# Patient Record
Sex: FEMALE | Race: WHITE | NOT HISPANIC OR LATINO | Employment: OTHER | URBAN - METROPOLITAN AREA
[De-identification: names, ages, dates, MRNs, and addresses within clinical notes are randomized per-mention and may not be internally consistent; named-entity substitution may affect disease eponyms.]

---

## 2017-03-15 ENCOUNTER — VIRTUAL VISIT (OUTPATIENT)
Dept: FAMILY MEDICINE | Facility: OTHER | Age: 62
End: 2017-03-15

## 2017-03-15 NOTE — PROGRESS NOTES
Date:   Clinician: Diana Rocha  Clinician NPI: [id number]  Patient: Carol Barkley  Patient : 1955  Patient Address: [address] [place] [address]  Patient Phone: [phone]  Visit Protocol: Santosh  Patient Summary:  Carol is a 62 year old ( : 1955 ) female who initiated a Zip because of a bump on the eyelid.     A synchronous visit is necessary because the patient reported the following abnormal symptoms:    Unable or prefers not to upload photo   She was unable to submit a picture of the lesion on her eyelid.  The patient's symptoms began 5 - 6 days ago. The lesion is located on the inside of the right upper eyelid. Carol indicated that the lesion does not look like a mole / nevus however, it does look like a pimple or boil.   The eyelid lesion is associated with crusting, eyelid swelling, and pain. The pain severity is a 2 / 10 on a pain scale.   There is no lacrimation present. The patient noted that the the lesion is staying the same (i.e. Neither progressing or diminishing).   In the last two to four weeks, Carol denies having having been treated for an eye infection, eye surgery, dust or dirt in the eye, and an injury to the eye. She also denies a fever, red sclera, hemorrhaging from the lesion, blepharitis throughout the entire eyelid, periorbital edema or inflammation, and light sensitivity. She does not wear contact lenses.   The patient does not smoke or use smokeless tobacco. The patient denies pregnancy and denies that she is breastfeeding.   MEDICATIONS:   Patient free text response: imitrex, clobetesol, retenoid cream  , ALLERGIES:  NKDA   Clinician Response:  Dear Carol,  Based on the information you provided during your interview, a conversation with a [facility] clinician was required in order to diagnose and treat you. I attempted to contact you several times but have not been able to reach you. For this reason, I cannot safely provide you with  treatment recommendations at this time. Because we care about your health, please be seen in a clinic or urgent care to determine the care that is best for you. You will not be charged for this Zip. Thanks for using [employer].   Diagnosis: Unable to contact patient by phone/video  Diagnosis ICD: R69  Synchronous Triage: video, status: ready, duration: 0 seconds

## 2017-03-24 ENCOUNTER — OFFICE VISIT (OUTPATIENT)
Dept: FAMILY MEDICINE | Facility: CLINIC | Age: 62
End: 2017-03-24
Payer: COMMERCIAL

## 2017-03-24 VITALS
HEART RATE: 66 BPM | DIASTOLIC BLOOD PRESSURE: 78 MMHG | TEMPERATURE: 98.5 F | OXYGEN SATURATION: 99 % | SYSTOLIC BLOOD PRESSURE: 129 MMHG | BODY MASS INDEX: 30.97 KG/M2 | WEIGHT: 189 LBS

## 2017-03-24 DIAGNOSIS — H00.011 HORDEOLUM EXTERNUM OF RIGHT UPPER EYELID: Primary | ICD-10-CM

## 2017-03-24 PROBLEM — H00.11 CHALAZION OF RIGHT UPPER EYELID: Status: ACTIVE | Noted: 2017-03-24

## 2017-03-24 PROCEDURE — 99213 OFFICE O/P EST LOW 20 MIN: CPT | Performed by: FAMILY MEDICINE

## 2017-03-24 RX ORDER — SULFAMETHOXAZOLE/TRIMETHOPRIM 800-160 MG
1 TABLET ORAL 2 TIMES DAILY
Qty: 20 TABLET | Refills: 0 | Status: SHIPPED | OUTPATIENT
Start: 2017-03-24 | End: 2017-03-24

## 2017-03-24 RX ORDER — SULFAMETHOXAZOLE/TRIMETHOPRIM 800-160 MG
1 TABLET ORAL 2 TIMES DAILY
Qty: 20 TABLET | Refills: 0 | Status: SHIPPED | OUTPATIENT
Start: 2017-03-24 | End: 2017-11-09

## 2017-03-24 ASSESSMENT — ENCOUNTER SYMPTOMS
EYE PAIN: 0
RESPIRATORY NEGATIVE: 1
BLURRED VISION: 0
PHOTOPHOBIA: 0
WEAKNESS: 0
EYE REDNESS: 0
WEIGHT LOSS: 0
CARDIOVASCULAR NEGATIVE: 1
FEVER: 0
EYE DISCHARGE: 0
HEADACHES: 0
DIAPHORESIS: 0
DOUBLE VISION: 0
CHILLS: 0

## 2017-03-24 NOTE — MR AVS SNAPSHOT
After Visit Summary   3/24/2017    Carol Barkley    MRN: [id number]           Patient Information     Date Of Birth          1955        Visit Information        Provider Department      3/24/2017 11:40 AM Olivia Chavez MD [facility]        Today's Diagnoses     Hordeolum externum of right upper eyelid    -  1      Care Instructions      Sty (or Stye)  A sty is an infection of the oil gland of the eyelid. It may develop into a small pocket of pus (abscess). This can cause pain, redness, and swelling. In early stages, styes are treated with antibiotic cream, eye drops, or warm packs (small towels soaked in warm water). More severe cases may need to be opened and drained by a health care provider.  Home care    Artificial tears may also be used to lubricate the eye and make it more comfortable. These may be purchased without a prescription.     Apply a warm, damp towel to the affected eye for at least 5 minutes, 3 to 4 times a day for a week. Warm compresses open the pores and speed the healing. If the compresses are too hot, they may burn your eyelid.    Sometimes the sty will drain with this treatment alone. If this happens, continue the antibiotic until all the redness and swelling are gone.    Wash your hands before and after touching the infected eye to avoid spreading the infection.    Do not squeeze or try to puncture the sty.  Follow-up care  Follow up with your health care provider, or as advised.   When to seek medical advice  Call your health care provider right away if you have:    Increase in swelling or redness around the eyelid after 48 to 72 hours    Increase in eye pain or the eyelid blisters    Increase in warmth--the eyelid feels hot    Drainage of blood or thick pus from the sty    Blister on the eyelid    Inability to open the eyelid due to swelling    Fever    1 degree above your normal temperature lasting for 24 to 48 hours, or    Whatever your health  care provider told you to report based on your medical condition    Vision changes    Headache or stiff neck    Recurrence of the sty    [id number] [employer]. [address] [place] [address], [place] [address]. All rights reserved. This information is not intended as a substitute for professional medical care. Always follow your healthcare professional's instructions.              Follow-ups after your visit        Who to contact     If you have questions or need follow up information about today's clinic visit or your schedule please contact [facility] directly at [phone].  Normal or non-critical lab and imaging results will be communicated to you by Meetuphart, letter or phone within 4 business days after the clinic has received the results. If you do not hear from us within 7 days, please contact the clinic through [email or web address] or phone. If you have a critical or abnormal lab result, we will notify you by phone as soon as possible.  Submit refill requests through HubHuman or call your pharmacy and they will forward the refill request to us. Please allow 3 business days for your refill to be completed.          Additional Information About Your Visit        MyChart Information     HubHuman gives you secure access to your electronic health record. If you see a primary care provider, you can also send messages to your care team and make appointments. If you have questions, please call your primary care clinic.  If you do not have a primary care provider, please call [phone] and they will assist you.        Care EveryWhere ID     This is your Care EveryWhere ID. This could be used by other organizations to access your [facility] medical records  [id number]        Your Vitals Were     Pulse Temperature Last Period Pulse Oximetry BMI (Body Mass Index)       66 98.5  F (36.9  C) (Tympanic) 11/30/2004 99% 30.97 kg/m2        Blood Pressure from Last 3 Encounters:   03/24/17 129/78   12/13/16 122/70    12/04/15 123/82    Weight from Last 3 Encounters:   03/24/17 189 lb (85.7 kg)   12/13/16 185 lb (83.9 kg)   12/04/15 186 lb (84.4 kg)              Today, you had the following     No orders found for display         Today's Medication Changes          These changes are accurate as of: 3/24/17 12:16 PM.  If you have any questions, ask your nurse or doctor.               Start taking these medicines.        Dose/Directions    sulfamethoxazole-trimethoprim 800-160 MG per tablet   Commonly known as:  BACTRIM DS/SEPTRA DS   Used for:  Hordeolum externum of right upper eyelid   Started by:  Olivia Chavez MD        Dose:  1 tablet   Take 1 tablet by mouth 2 times daily   Quantity:  20 tablet   Refills:  0            Where to get your medicines      These medications were sent to [facility] [address]  [address], [facility] [address]     Phone:  [phone]     sulfamethoxazole-trimethoprim 800-160 MG per tablet                Primary Care Provider Office Phone # Fax #    Andrew Nair [phone]       [place] [address]  [place] [facility] [facility] [address]        Thank you!     Thank you for choosing [facility]  for your care. Our goal is always to provide you with excellent care. Hearing back from our patients is one way we can continue to improve our services. Please take a few minutes to complete the written survey that you may receive in the mail after your visit with us. Thank you!             Your Updated Medication List - Protect others around you: Learn how to safely use, store and throw away your medicines at www.disposemymeds.org.          This list is accurate as of: 3/24/17 12:16 PM.  Always use your most recent med list.                   Brand Name Dispense Instructions for use    aspirin 81 MG tablet     100    ONE DAILY       clobetasol 0.05 % ointment    TEMOVATE         fluocinolone 0.01 % solution    SYNALAR          fluticasone 50 MCG/ACT spray    FLONASE    16 g    Spray 2 sprays into both nostrils daily       PREMARIN cream   Generic drug:  conjugated estrogens          sulfamethoxazole-trimethoprim 800-160 MG per tablet    BACTRIM DS/SEPTRA DS    20 tablet    Take 1 tablet by mouth 2 times daily       SUMAtriptan 100 MG tablet    IMITREX    9 tablet    Take 1 tablet by mouth. May repeat in 2 hours if needed: max 2/day; average number of headaches monthly       tretinoin 0.025 % cream    RETIN-A         zolpidem 5 MG tablet    AMBIEN    30 tablet    Take 0.5-1 tablets (2.5-5 mg) by mouth nightly as needed for sleep . Add'l refills to be approved by PCP.

## 2017-03-24 NOTE — PROGRESS NOTES
HPI      Eye(s) Problem      Duration: x 2 weeks ago gradually worsening    Description:  Location: right  Pain: YES- to the touch  Redness: YES  Discharge: YES- morning    Accompanying signs and symptoms: Bump under eyelid    History (Trauma, foreign body exposure,): None    Precipitating or alleviating factors (contact use): None    Therapies tried and outcome: Hot compress        Allergies   Allergen Reactions     No Known Drug Allergies       Past Medical History:   Diagnosis Date     Allergic rhinitis due to other allergen     Immunotherapy     CKD (chronic kidney disease) stage 2, GFR 60-89 ml/min      Cough     seen by Dr Freire and ENT, CXR NL     Cystourethrocele      Hearing loss      Lichen sclerosus et atrophicus of the vulva     [place]     Migraine without aura, without mention of intractable migraine without mention of status migrainosus      PAC (premature atrial contraction) 12/2009    Holter     Personal history of colonic polyps      Pure hypercholesterolemia      PVD (posterior vitreous detachment)      Rectocele      Unspecified sinusitis (chronic)      Urine, incontinence, stress female         Review of Systems   Constitutional: Negative for chills, diaphoresis, fever, malaise/fatigue and weight loss.   Eyes: Negative for blurred vision, double vision, photophobia, pain, discharge and redness.   Respiratory: Negative.    Cardiovascular: Negative.    Neurological: Negative for weakness and headaches.     /78 (BP Location: Right arm, Patient Position: Chair, Cuff Size: Adult Large)  Pulse 66  Temp 98.5  F (36.9  C) (Tympanic)  Wt 189 lb (85.7 kg)  LMP 11/30/2004  SpO2 99%  BMI 30.97 kg/m2     Physical Exam   Constitutional: She is well-developed, well-nourished, and in no distress.   Eyes: Conjunctivae and EOM are normal. Pupils are equal, round, and reactive to light. Lids are everted and swept, no foreign bodies found. Right eye exhibits hordeolum. Right conjunctiva is not injected.  Right conjunctiva has no hemorrhage.       0.5 cm erythematous swelling of mid right upper lid with no drainage. Eyelashes normal.  Left eye normal.   Neurological: She is alert.   Skin: Skin is warm and dry.   Psychiatric: Mood and affect normal.       ASSESSMENT / PLAN:  (H00.011) Hordeolum externum of right upper eyelid  (primary encounter diagnosis)  Comment: Please see patient instructions below.   strongly recommended ongoing frequent use of warm compresses.  Follow up with opthlamology/eye surgeon if symptoms persist for I and D.  The patient indicates understanding of these issues and agrees with the plan.   Plan: sulfamethoxazole-trimethoprim (BACTRIM         DS/SEPTRA DS) 800-160 MG per tablet  Twice daily for 10 days

## 2017-03-24 NOTE — NURSING NOTE
"Chief Complaint   Patient presents with     Eye Problem       Initial /78 (BP Location: Right arm, Patient Position: Chair, Cuff Size: Adult Large)  Pulse 66  Temp 98.5  F (36.9  C) (Tympanic)  Wt 189 lb (85.7 kg)  LMP 11/30/2004  SpO2 99%  BMI 30.97 kg/m2 Estimated body mass index is 30.97 kg/(m^2) as calculated from the following:    Height as of 12/13/16: 5' 5.5\" (1.664 m).    Weight as of this encounter: 189 lb (85.7 kg).  Medication Reconciliation: complete     Deanna Howard MA    "

## 2017-03-24 NOTE — PATIENT INSTRUCTIONS
Sty (or Stye)  A sty is an infection of the oil gland of the eyelid. It may develop into a small pocket of pus (abscess). This can cause pain, redness, and swelling. In early stages, styes are treated with antibiotic cream, eye drops, or warm packs (small towels soaked in warm water). More severe cases may need to be opened and drained by a health care provider.  Home care    Artificial tears may also be used to lubricate the eye and make it more comfortable. These may be purchased without a prescription.     Apply a warm, damp towel to the affected eye for at least 5 minutes, 3 to 4 times a day for a week. Warm compresses open the pores and speed the healing. If the compresses are too hot, they may burn your eyelid.    Sometimes the sty will drain with this treatment alone. If this happens, continue the antibiotic until all the redness and swelling are gone.    Wash your hands before and after touching the infected eye to avoid spreading the infection.    Do not squeeze or try to puncture the sty.  Follow-up care  Follow up with your health care provider, or as advised.   When to seek medical advice  Call your health care provider right away if you have:    Increase in swelling or redness around the eyelid after 48 to 72 hours    Increase in eye pain or the eyelid blisters    Increase in warmth--the eyelid feels hot    Drainage of blood or thick pus from the sty    Blister on the eyelid    Inability to open the eyelid due to swelling    Fever    1 degree above your normal temperature lasting for 24 to 48 hours, or    Whatever your health care provider told you to report based on your medical condition    Vision changes    Headache or stiff neck    Recurrence of the sty    [id number] [employer]. [address] [place] [address]. All rights reserved. This information is not intended as a substitute for professional medical care. Always follow your healthcare professional's  instructions.

## 2017-03-24 NOTE — PROGRESS NOTES
SUBJECTIVE:                                                    Carol Barkley is a 62 year old female who presents to clinic today for the following health issues:

## 2017-06-28 DIAGNOSIS — G47.09 OTHER INSOMNIA: ICD-10-CM

## 2017-06-28 RX ORDER — ZOLPIDEM TARTRATE 5 MG/1
2.5-5 TABLET ORAL
Qty: 30 TABLET | Refills: 0 | Status: SHIPPED | OUTPATIENT
Start: 2017-06-28 | End: 2017-09-28

## 2017-09-28 DIAGNOSIS — G47.09 OTHER INSOMNIA: ICD-10-CM

## 2017-09-29 RX ORDER — ZOLPIDEM TARTRATE 5 MG/1
2.5-5 TABLET ORAL
Qty: 30 TABLET | Refills: 0 | Status: SHIPPED | OUTPATIENT
Start: 2017-09-29 | End: 2017-12-19

## 2017-11-02 ENCOUNTER — OFFICE VISIT (OUTPATIENT)
Dept: URGENT CARE | Facility: URGENT CARE | Age: 62
End: 2017-11-02
Payer: COMMERCIAL

## 2017-11-02 ENCOUNTER — TELEPHONE (OUTPATIENT)
Dept: FAMILY MEDICINE | Facility: CLINIC | Age: 62
End: 2017-11-02

## 2017-11-02 VITALS
BODY MASS INDEX: 28.93 KG/M2 | WEIGHT: 180 LBS | TEMPERATURE: 98.7 F | OXYGEN SATURATION: 98 % | SYSTOLIC BLOOD PRESSURE: 142 MMHG | HEART RATE: 68 BPM | DIASTOLIC BLOOD PRESSURE: 85 MMHG | HEIGHT: 66 IN

## 2017-11-02 DIAGNOSIS — R53.83 FATIGUE, UNSPECIFIED TYPE: Primary | ICD-10-CM

## 2017-11-02 DIAGNOSIS — M25.50 PAIN IN JOINT, MULTIPLE SITES: ICD-10-CM

## 2017-11-02 DIAGNOSIS — R51.9 ACUTE NONINTRACTABLE HEADACHE, UNSPECIFIED HEADACHE TYPE: ICD-10-CM

## 2017-11-02 DIAGNOSIS — M79.10 MYALGIA: ICD-10-CM
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
## 2017-11-02 PROCEDURE — 99214 OFFICE O/P EST MOD 30 MIN: CPT | Performed by: PHYSICIAN ASSISTANT

## 2017-11-02 RX ORDER — DOXYCYCLINE 100 MG/1
100 CAPSULE ORAL 2 TIMES DAILY
Qty: 42 CAPSULE | Refills: 0 | Status: SHIPPED | OUTPATIENT
Start: 2017-11-02 | End: 2017-11-20 | Stop reason: SINTOL

## 2017-11-02 NOTE — PROGRESS NOTES
SUBJECTIVE:   Carol Barkley is a 62 year old female presenting with a chief complaint of flu like illness and fatigue.  Onset of symptoms was 1 week(s) ago.  Course of illness is worsening.    Severity moderately severe  Current and Associated symptoms: headache, body aches, fatigue and sore neck  Has a history of OA but worse    Treatment measures tried include None tried.  Predisposing factors include spends a lot of time outside with tree.    [facility] in [place] had a western Blot with one band is positive for lyme disease.  The patient is requesting treatment for Lyme disease based on her symptoms.    No skin rash reported.  She has no lymphadenopathy or large monoarthropathy.      Past Medical History:   Diagnosis Date     Allergic rhinitis due to other allergen     Immunotherapy     CKD (chronic kidney disease) stage 2, GFR 60-89 ml/min      Cough     seen by Dr Freire and ENT, CXR NL     Cystourethrocele      Hearing loss      Lichen sclerosus et atrophicus of the vulva     [place]     Migraine without aura, without mention of intractable migraine without mention of status migrainosus      PAC (premature atrial contraction) 12/2009    Holter     Personal history of colonic polyps      Pure hypercholesterolemia      PVD (posterior vitreous detachment)      Rectocele      Unspecified sinusitis (chronic)      Urine, incontinence, stress female      Current Outpatient Prescriptions   Medication Sig Dispense Refill     zolpidem (AMBIEN) 5 MG tablet Take 0.5-1 tablets (2.5-5 mg) by mouth nightly as needed for sleep . Add'l refills to be approved by PCP. 30 tablet 0     sulfamethoxazole-trimethoprim (BACTRIM DS/SEPTRA DS) 800-160 MG per tablet Take 1 tablet by mouth 2 times daily 20 tablet 0     SUMAtriptan (IMITREX) 100 MG tablet Take 1 tablet by mouth. May repeat in 2 hours if needed: max 2/day; average number of headaches monthly 9 tablet 1     fluticasone (FLONASE) 50 MCG/ACT nasal spray Spray 2  "sprays into both nostrils daily 16 g 0     PREMARIN vaginal cream        clobetasol (TEMOVATE) 0.05 % ointment        fluocinolone (SYNALAR) 0.01 % external solution        tretinoin (RETIN-A) 0.025 % cream        ASPIRIN 81 MG OR TABS ONE DAILY 100 3     Social History   Substance Use Topics     Smoking status: Never Smoker     Smokeless tobacco: Never Used     Alcohol use No      Comment: 1 drinks per week       ROS:  Review of systems negative except as stated above.    OBJECTIVE:  /85  Pulse 68  Temp 98.7  F (37.1  C) (Tympanic)  Ht 5' 6\" (1.676 m)  Wt 180 lb (81.6 kg)  LMP 11/30/2004  SpO2 98%  BMI 29.05 kg/m2  GENERAL APPEARANCE: healthy, alert and no distress  EYES: EOMI,  PERRL, conjunctiva clear  HENT: ear canals and TM's normal.  Nose and mouth without ulcers, erythema or lesions  NECK: supple, nontender, no lymphadenopathy  RESP: lungs clear to auscultation - no rales, rhonchi or wheezes  CV: regular rates and rhythm, normal S1 S2, no murmur noted  ABDOMEN:  soft, nontender  NEURO: Normal strength and tone, sensory exam grossly normal,  normal speech and mentation  SKIN: no suspicious lesions or rashes    ASSESSMENT:  (R53.83) Fatigue, unspecified type  (primary encounter diagnosis)  (W57.XXXA) Tick bite, initial encounter  (R51) Acute nonintractable headache, unspecified headache type  (M79.1) Myalgia  (M25.50) Pain in joint, multiple sites    PLAN:  I advised the patient that the CDC recommends treatment with 5 or more bands are positive.  Patient was to be treated empirically for lyme disease based on the one band returned and her symptoms.  I advised her that we will treat with a 3 week course of Doxycycline to help her symptoms.  She will follow up with her family MD after two weeks for reevaluation of symptoms and treatment plan.  Indication for return in interim gone over.    - doxycycline (VIBRAMYCIN) 100 MG capsule; Take 1 capsule (100 mg) by mouth 2 times daily for 21 days  " Dispense: 42 capsule; Refill: 0

## 2017-11-02 NOTE — NURSING NOTE
"Chief Complaint   Patient presents with     Urgent Care     Fatigue     c/o fatigue and HA       Initial /85  Pulse 68  Temp 98.7  F (37.1  C) (Tympanic)  Ht 5' 6\" (1.676 m)  Wt 180 lb (81.6 kg)  LMP 11/30/2004  SpO2 98%  BMI 29.05 kg/m2 Estimated body mass index is 29.05 kg/(m^2) as calculated from the following:    Height as of this encounter: 5' 6\" (1.676 m).    Weight as of this encounter: 180 lb (81.6 kg).  Medication Reconciliation: complete   Faith Mancilla MA    "

## 2017-11-02 NOTE — MR AVS SNAPSHOT
After Visit Summary   11/2/2017    Carol Barkley    MRN: [id number]           Patient Information     Date Of Birth          1955        Visit Information        Provider Department      11/2/2017 5:10 PM Hans Covarrubias PA-C [facility]        Today's Diagnoses     Fatigue, unspecified type    -  1    Tick bite, initial encounter        Acute nonintractable headache, unspecified headache type        Myalgia        Pain in joint, multiple sites           Follow-ups after your visit        Your next 10 appointments already scheduled     Nov 09, 2017  1:00 PM CST   [facility]YSICAL with [facility]air MD   [facility]    [address] [address] [phone]              Who to contact     If you have questions or need follow up information about today's clinic visit or your schedule please contact [facility] directly at [phone].  Normal or non-critical lab and imaging results will be communicated to you by MyChart, letter or phone within 4 business days after the clinic has received the results. If you do not hear from us within 7 days, please contact the clinic through UpRacehart or phone. If you have a critical or abnormal lab result, we will notify you by phone as soon as possible.  Submit refill requests through Applitools or call your pharmacy and they will forward the refill request to us. Please allow 3 business days for your refill to be completed.          Additional Information About Your Visit        MyChart Information     App[employer] gives you secure access to your electronic health record. If you see a primary care provider, you can also send messages to your care team and make appointments. If you have questions, please call your primary care clinic.  If you do not have a primary care provider, please call [phone] and they will assist you.        Care EveryWhere ID     This  "is your Care EveryWhere ID. This could be used by other organizations to access your [place] medical records  [id number]        Your Vitals Were     Pulse Temperature Height Last Period Pulse Oximetry BMI (Body Mass Index)    68 98.7  F (37.1  C) (Tympanic) 5' 6\" (1.676 m) 11/30/2004 98% 29.05 kg/m2       Blood Pressure from Last 3 Encounters:   11/02/17 142/85   03/24/17 129/78   12/13/16 122/70    Weight from Last 3 Encounters:   11/02/17 180 lb (81.6 kg)   03/24/17 189 lb (85.7 kg)   12/13/16 185 lb (83.9 kg)              Today, you had the following     No orders found for display         Today's Medication Changes          These changes are accurate as of: 11/2/17  6:17 PM.  If you have any questions, ask your nurse or doctor.               Start taking these medicines.        Dose/Directions    doxycycline 100 MG capsule   Commonly known as:  VIBRAMYCIN   Used for:  Fatigue, unspecified type, Tick bite, initial encounter, Acute nonintractable headache, unspecified headache type, Myalgia, Pain in joint, multiple sites   Started by:  Hans Covarrubias PA-C        Dose:  100 mg   Take 1 capsule (100 mg) by mouth 2 times daily for 21 days   Quantity:  42 capsule   Refills:  0            Where to get your medicines      These medications were sent to [facility] - [place], [place] - [address]  [address], [place] [address]     Phone:  [phone]     doxycycline 100 MG capsule                Primary Care Provider Office Phone # Fax #    Andrew Nair [phone]       [address] [facility] [address] [facility] [place] [facility] [address]        Equal Access to Services     WALI KENNEDY AH: Hadii abdullahi Solorio, waaxda luqadaha, qaybta kaalmada adeegyada, juan mireles. So [facility] [phone].    ATENCIÓN: Si habla español, tiene a waldron disposición servicios gratuitos de asistencia lingüística. Llame al [phone].    We comply with applicable federal civil rights laws " and [place] laws. We do not discriminate on the basis of race, color, national origin, age, disability, sex, sexual orientation, or gender identity.            Thank you!     Thank you for choosing [facility]  for your care. Our goal is always to provide you with excellent care. Hearing back from our patients is one way we can continue to improve our services. Please take a few minutes to complete the written survey that you may receive in the mail after your visit with us. Thank you!             Your Updated Medication List - Protect others around you: Learn how to safely use, store and throw away your medicines at www.disposemymeds.org.          This list is accurate as of: 11/2/17  6:17 PM.  Always use your most recent med list.                   Brand Name Dispense Instructions for use Diagnosis    aspirin 81 MG tablet     100    ONE DAILY    Pure hypercholesterolemia       clobetasol 0.05 % ointment    TEMOVATE          doxycycline 100 MG capsule    VIBRAMYCIN    42 capsule    Take 1 capsule (100 mg) by mouth 2 times daily for 21 days    Fatigue, unspecified type, Tick bite, initial encounter, Acute nonintractable headache, unspecified headache type, Myalgia, Pain in joint, multiple sites       fluocinolone 0.01 % solution    SYNALAR          fluticasone 50 MCG/ACT spray    FLONASE    16 g    Spray 2 sprays into both nostrils daily    Seasonal allergic rhinitis       PREMARIN cream   Generic drug:  conjugated estrogens           sulfamethoxazole-trimethoprim 800-160 MG per tablet    BACTRIM DS/SEPTRA DS    20 tablet    Take 1 tablet by mouth 2 times daily    Hordeolum externum of right upper eyelid       SUMAtriptan 100 MG tablet    IMITREX    9 tablet    Take 1 tablet by mouth. May repeat in 2 hours if needed: max 2/day; average number of headaches monthly    Migraine       tretinoin 0.025 % cream    RETIN-A          zolpidem 5 MG tablet    AMBIEN    30 tablet    Take 0.5-1 tablets  (2.5-5 mg) by mouth nightly as needed for sleep . Add'l refills to be approved by PCP.    Other insomnia

## 2017-11-02 NOTE — TELEPHONE ENCOUNTER
Pt has a positive band #23 from getting a lyme test done  Has been having many sx- poss caused by lyme disease  Advised pt do an evisit with Andrew Nair MD- and upload the lab result  Or can come into [facility]- at 5-9pm  Pt in agreement with .   Thanks!     Leticia Todd RN

## 2017-11-09 ENCOUNTER — OFFICE VISIT (OUTPATIENT)
Dept: FAMILY MEDICINE | Facility: CLINIC | Age: 62
End: 2017-11-09
Payer: COMMERCIAL

## 2017-11-09 VITALS
TEMPERATURE: 98 F | DIASTOLIC BLOOD PRESSURE: 82 MMHG | WEIGHT: 185 LBS | RESPIRATION RATE: 16 BRPM | HEART RATE: 72 BPM | SYSTOLIC BLOOD PRESSURE: 133 MMHG | OXYGEN SATURATION: 98 % | BODY MASS INDEX: 30.82 KG/M2 | HEIGHT: 65 IN

## 2017-11-09 DIAGNOSIS — E55.9 VITAMIN D DEFICIENCY: ICD-10-CM

## 2017-11-09 DIAGNOSIS — N39.3 FEMALE STRESS INCONTINENCE: ICD-10-CM

## 2017-11-09 DIAGNOSIS — Z23 NEED FOR PROPHYLACTIC VACCINATION AND INOCULATION AGAINST INFLUENZA: ICD-10-CM

## 2017-11-09 DIAGNOSIS — G43.909 MIGRAINE WITHOUT STATUS MIGRAINOSUS, NOT INTRACTABLE, UNSPECIFIED MIGRAINE TYPE: ICD-10-CM

## 2017-11-09 DIAGNOSIS — Z00.00 ROUTINE GENERAL MEDICAL EXAMINATION AT A HEALTH CARE FACILITY: Primary | ICD-10-CM

## 2017-11-09 PROCEDURE — 80061 LIPID PANEL: CPT | Performed by: FAMILY MEDICINE

## 2017-11-09 PROCEDURE — 80053 COMPREHEN METABOLIC PANEL: CPT | Performed by: FAMILY MEDICINE

## 2017-11-09 PROCEDURE — 99396 PREV VISIT EST AGE 40-64: CPT | Performed by: FAMILY MEDICINE

## 2017-11-09 PROCEDURE — 84443 ASSAY THYROID STIM HORMONE: CPT | Performed by: FAMILY MEDICINE

## 2017-11-09 PROCEDURE — 82306 VITAMIN D 25 HYDROXY: CPT | Performed by: FAMILY MEDICINE

## 2017-11-09 PROCEDURE — 36415 COLL VENOUS BLD VENIPUNCTURE: CPT | Performed by: FAMILY MEDICINE

## 2017-11-09 RX ORDER — SUMATRIPTAN 100 MG/1
TABLET, FILM COATED ORAL
Qty: 9 TABLET | Refills: 11 | Status: SHIPPED | OUTPATIENT
Start: 2017-11-09 | End: 2021-02-25

## 2017-11-09 NOTE — MR AVS SNAPSHOT
After Visit Summary   11/9/2017    Carol Barkley    MRN: [id number]           Patient Information     Date Of Birth          1955        Visit Information        Provider Department      11/9/2017 1:00 PM Andrew Nair MD [facility]        Today's Diagnoses     Routine general medical examination at a health care facility    -  1    Need for prophylactic vaccination and inoculation against influenza        Migraine without status migrainosus, not intractable, unspecified migraine type          Care Instructions      Preventive Health Recommendations  Female Ages 50 - 64    Yearly exam: See your health care provider every year in order to  o Review health changes.   o Discuss preventive care.    o Review your medicines if your doctor has prescribed any.      Get a Pap test every three years (unless you have an abnormal result and your provider advises testing more often).    If you get Pap tests with HPV test, you only need to test every 5 years, unless you have an abnormal result.     You do not need a Pap test if your uterus was removed (hysterectomy) and you have not had cancer.    You should be tested each year for STDs (sexually transmitted diseases) if you're at risk.     Have a mammogram every 1 to 2 years.    Have a colonoscopy at age 50, or have a yearly FIT test (stool test). These exams screen for colon cancer.      Have a cholesterol test every 5 years, or more often if advised.    Have a diabetes test (fasting glucose) every three years. If you are at risk for diabetes, you should have this test more often.     If you are at risk for osteoporosis (brittle bone disease), think about having a bone density scan (DEXA).    Shots: Get a flu shot each year. Get a tetanus shot every 10 years.    Nutrition:     Eat at least 5 servings of fruits and vegetables each day.    Eat whole-grain bread, whole-wheat pasta and brown rice instead of white grains and rice.    Talk  "to your provider about Calcium and Vitamin D.     Lifestyle    Exercise at least 150 minutes a week (30 minutes a day, 5 days a week). This will help you control your weight and prevent disease.    Limit alcohol to one drink per day.    No smoking.     Wear sunscreen to prevent skin cancer.     See your dentist every six months for an exam and cleaning.    See your eye doctor every 1 to 2 years.            Follow-ups after your visit        Who to contact     If you have questions or need follow up information about today's clinic visit or your schedule please contact [facility] directly at [phone].  Normal or non-critical lab and imaging results will be communicated to you by Counterceptshart, letter or phone within 4 business days after the clinic has received the results. If you do not hear from us within 7 days, please contact the clinic through Zafint or phone. If you have a critical or abnormal lab result, we will notify you by phone as soon as possible.  Submit refill requests through [employer] or call your pharmacy and they will forward the refill request to us. Please allow 3 business days for your refill to be completed.          Additional Information About Your Visit        MyChart Information     [employer] gives you secure access to your electronic health record. If you see a primary care provider, you can also send messages to your care team and make appointments. If you have questions, please call your primary care clinic.  If you do not have a primary care provider, please call [phone] and they will assist you.        Care EveryWhere ID     This is your Care EveryWhere ID. This could be used by other organizations to access your [place] medical records  [id number]        Your Vitals Were     Pulse Temperature Respirations Height Last Period Pulse Oximetry    72 98  F (36.7  C) (Oral) 16 5' 5\" (1.651 m) 11/30/2004 98%    BMI (Body Mass Index)                   30.79 kg/m2         "    Blood Pressure from Last 3 Encounters:   11/09/17 133/82   11/02/17 142/85   03/24/17 129/78    Weight from Last 3 Encounters:   11/09/17 185 lb (83.9 kg)   11/02/17 180 lb (81.6 kg)   03/24/17 189 lb (85.7 kg)              We Performed the Following     Comprehensive metabolic panel     Lipid panel reflex to direct LDL Fasting          Where to get your medicines      These medications were sent to [facility] - [place], [place] - [address]  [address], [place] [address]     Phone:  [phone]     SUMAtriptan 100 MG tablet          Primary Care Provider Office Phone # Fax #    Andrew Nair [phone]       [address] [facility] [address]        Equal Access to Services     JODI KENNEDY : Hadii aad ku hadasho Soelliott, waaxda luqadaha, qaybta kaalmada sanam, juan groves . So [facility] [phone].    ATENCIÓN: Si habla español, tiene a waldron disposición servicios gratuitos de asistencia lingüística. [facility] [phone].    We comply with applicable federal civil rights laws and [place] laws. We do not discriminate on the basis of race, color, national origin, age, disability, sex, sexual orientation, or gender identity.            Thank you!     Thank you for choosing [facility]  for your care. Our goal is always to provide you with excellent care. Hearing back from our patients is one way we can continue to improve our services. Please take a few minutes to complete the written survey that you may receive in the mail after your visit with us. Thank you!             Your Updated Medication List - Protect others around you: Learn how to safely use, store and throw away your medicines at www.disposemymeds.org.          This list is accurate as of: 11/9/17  1:25 PM.  Always use your most recent med list.                   Brand Name Dispense Instructions for use Diagnosis    aspirin 81 MG tablet     100    ONE  DAILY    Pure hypercholesterolemia       clobetasol 0.05 % ointment    TEMOVATE          doxycycline 100 MG capsule    VIBRAMYCIN    42 capsule    Take 1 capsule (100 mg) by mouth 2 times daily for 21 days    Fatigue, unspecified type, Tick bite, initial encounter, Acute nonintractable headache, unspecified headache type, Myalgia, Pain in joint, multiple sites       fluocinolone 0.01 % solution    SYNALAR          fluticasone 50 MCG/ACT spray    FLONASE    16 g    Spray 2 sprays into both nostrils daily    Seasonal allergic rhinitis       PREMARIN cream   Generic drug:  conjugated estrogens           SUMAtriptan 100 MG tablet    IMITREX    9 tablet    Take 1 tablet by mouth. May repeat in 2 hours if needed: max 2/day; average number of headaches monthly    Migraine without status migrainosus, not intractable, unspecified migraine type       tretinoin 0.025 % cream    RETIN-A          zolpidem 5 MG tablet    AMBIEN    30 tablet    Take 0.5-1 tablets (2.5-5 mg) by mouth nightly as needed for sleep . Add'l refills to be approved by PCP.    Other insomnia

## 2017-11-09 NOTE — NURSING NOTE
"Chief Complaint   Patient presents with     Physical       Initial /82 (BP Location: Right arm, Patient Position: Sitting, Cuff Size: Adult Regular)  Pulse 72  Temp 98  F (36.7  C) (Oral)  Resp 16  Ht 5' 5\" (1.651 m)  Wt 185 lb (83.9 kg)  LMP 11/30/2004  SpO2 98%  BMI 30.79 kg/m2 Estimated body mass index is 30.79 kg/(m^2) as calculated from the following:    Height as of this encounter: 5' 5\" (1.651 m).    Weight as of this encounter: 185 lb (83.9 kg).  Medication Reconciliation: complete     Lemuel Jones MA      "

## 2017-11-09 NOTE — PROGRESS NOTES
SUBJECTIVE:   CC: Carol Barkley is an 62 year old woman who presents for preventive health visit.     Physical   Annual:     Getting at least 3 servings of Calcium per day::  Yes    Bi-annual eye exam::  NO    Dental care twice a year::  Yes    Sleep apnea or symptoms of sleep apnea::  None    Diet::  Regular (no restrictions)    Frequency of exercise::  4-5 days/week    Duration of exercise::  30-45 minutes    Taking medications regularly::  Yes    Additional concerns today::  YES    Other Concern: Pt want to discuss about lipids and heart valve issue. Also having pain/swelling on the right lymph. Wants referral for Mammogram due to family HX.    -------------------------------------    Today's PHQ-2 Score:   PHQ-2 ( 1999 Pfizer) 11/8/2017   Q1: Little interest or pleasure in doing things 0   Q2: Feeling down, depressed or hopeless 0   PHQ-2 Score 0   Q1: Little interest or pleasure in doing things Not at all   Q2: Feeling down, depressed or hopeless Not at all   PHQ-2 Score 0       Abuse: Current or Past(Physical, Sexual or Emotional)- No  Do you feel safe in your environment - Yes    Social History   Substance Use Topics     Smoking status: Never Smoker     Smokeless tobacco: Never Used     Alcohol use No      Comment: 1 drinks per week     The patient does not drink >3 drinks per day nor >7 drinks per week.    Reviewed orders with patient.  Reviewed health maintenance and updated orders accordingly - Yes  Labs reviewed in EPIC        Pertinent mammograms are reviewed under the imaging tab.  History of abnormal Pap smear:     Reviewed and updated as needed this visit by clinical staff  Tobacco  Allergies  Med Hx  Surg Hx  Fam Hx  Soc Hx        Reviewed and updated as needed this visit by Provider            Review of Systems  C: NEGATIVE for fever, chills, change in weight  I: NEGATIVE for worrisome rashes, moles or lesions  E: NEGATIVE for vision changes or irritation  ENT: NEGATIVE for ear, mouth and  "throat problems  R: NEGATIVE for significant cough or SOB  B: NEGATIVE for masses, tenderness or discharge  CV: NEGATIVE for chest pain, palpitations or peripheral edema  GI: NEGATIVE for nausea, abdominal pain, heartburn, or change in bowel habits  : NEGATIVE for unusual urinary or vaginal symptoms. No vaginal bleeding. She does have some intermittent stress incontinnce  M: NEGATIVE for significant arthralgias or myalgia except for right sided neck pain which is imporving with treatment for suspected lyme disease.   N: NEGATIVE for weakness, dizziness or paresthesias  P: NEGATIVE for changes in mood or affect      OBJECTIVE:   /82 (BP Location: Right arm, Patient Position: Sitting, Cuff Size: Adult Regular)  Pulse 72  Temp 98  F (36.7  C) (Oral)  Resp 16  Ht 5' 5\" (1.651 m)  Wt 185 lb (83.9 kg)  LMP 11/30/2004  SpO2 98%  BMI 30.79 kg/m2  Physical Exam  GENERAL: healthy, alert and no distress  EYES: Eyes grossly normal to inspection, PERRL and conjunctivae and sclerae normal  HENT: ear canals and TM's normal, nose and mouth without ulcers or lesions  NECK: no adenopathy, no asymmetry, masses, or scars and thyroid normal to palpation  RESP: lungs clear to auscultation - no rales, rhonchi or wheezes  BREAST: normal without masses, tenderness or nipple discharge and no palpable axillary masses or adenopathy  CV: regular rate and rhythm, normal S1 S2, no S3 or S4, no murmur, click or rub, no peripheral edema and peripheral pulses strong  ABDOMEN: soft, nontender, no hepatosplenomegaly, no masses and bowel sounds normal  MS: no gross musculoskeletal defects noted, no edema  SKIN: no suspicious lesions or rashes  NEURO: Normal strength and tone, mentation intact and speech normal  PSYCH: mentation appears normal, affect normal/bright    ASSESSMENT/PLAN:       ICD-10-CM    1. Routine general medical examination at a health care facility Z00.00 Lipid panel reflex to direct LDL Fasting     Comprehensive " "metabolic panel     Vitamin D Deficiency     TSH with free T4 reflex   2. Need for prophylactic vaccination and inoculation against influenza Z23    3. Migraine without status migrainosus, not intractable, unspecified migraine type G43.909 SUMAtriptan (IMITREX) 100 MG tablet   4. Vitamin D deficiency E55.9 Vitamin D Deficiency   5. Female stress incontinence N39.3    she will follow up if the neck pain worsens or fails to resolve. Consider physical therapy for stress incontinence.     COUNSELING:  Reviewed preventive health counseling, as reflected in patient instructions       Regular exercise       Healthy diet/nutrition       Safe sex practices/STD prevention       Colon cancer screening    BP Screening:   Last 3 BP Readings:    BP Readings from Last 3 Encounters:   11/09/17 133/82   11/02/17 142/85   03/24/17 129/78       The following was recommended to the patient:  Re-screen BP within a year and recommended lifestyle modifications     reports that she has never smoked. She has never used smokeless tobacco.    Estimated body mass index is 30.79 kg/(m^2) as calculated from the following:    Height as of this encounter: 5' 5\" (1.651 m).    Weight as of this encounter: 185 lb (83.9 kg).   Weight management plan: Discussed healthy diet and exercise guidelines and patient will follow up in 12 months in clinic to re-evaluate.    Counseling Resources:  ATP IV Guidelines  Pooled Cohorts Equation Calculator  Breast Cancer Risk Calculator  FRAX Risk Assessment  ICSI Preventive Guidelines  Dietary Guidelines for Americans, 2010  USDA's MyPlate  ASA Prophylaxis  Lung CA Screening    Andrew Nair MD  [facility]  Answers for HPI/ROS submitted by the patient on 11/8/2017   PHQ-2 Score: 0    "

## 2017-11-10 LAB
ALBUMIN SERPL-MCNC: 3.9 G/DL (ref 3.4–5)
ALP SERPL-CCNC: 85 U/L (ref 40–150)
ALT SERPL W P-5'-P-CCNC: 41 U/L (ref 0–50)
ANION GAP SERPL CALCULATED.3IONS-SCNC: 9 MMOL/L (ref 3–14)
AST SERPL W P-5'-P-CCNC: 33 U/L (ref 0–45)
BILIRUB SERPL-MCNC: 0.4 MG/DL (ref 0.2–1.3)
BUN SERPL-MCNC: 16 MG/DL (ref 7–30)
CALCIUM SERPL-MCNC: 8.9 MG/DL (ref 8.5–10.1)
CHLORIDE SERPL-SCNC: 107 MMOL/L (ref 94–109)
CHOLEST SERPL-MCNC: 207 MG/DL
CO2 SERPL-SCNC: 26 MMOL/L (ref 20–32)
CREAT SERPL-MCNC: 0.83 MG/DL (ref 0.52–1.04)
DEPRECATED CALCIDIOL+CALCIFEROL SERPL-MC: 39 UG/L (ref 20–75)
GFR SERPL CREATININE-BSD FRML MDRD: 69 ML/MIN/1.7M2
GLUCOSE SERPL-MCNC: 79 MG/DL (ref 70–99)
HDLC SERPL-MCNC: 54 MG/DL
LDLC SERPL CALC-MCNC: 118 MG/DL
NONHDLC SERPL-MCNC: 153 MG/DL
POTASSIUM SERPL-SCNC: 3.9 MMOL/L (ref 3.4–5.3)
PROT SERPL-MCNC: 7.3 G/DL (ref 6.8–8.8)
SODIUM SERPL-SCNC: 142 MMOL/L (ref 133–144)
TRIGL SERPL-MCNC: 175 MG/DL
TSH SERPL DL<=0.005 MIU/L-ACNC: 1.93 MU/L (ref 0.4–4)

## 2017-12-19 DIAGNOSIS — G47.09 OTHER INSOMNIA: ICD-10-CM

## 2017-12-19 NOTE — TELEPHONE ENCOUNTER
Controlled Substance Refill Request for zolpidem (AMBIEN) 5 MG tablet    Problem List Complete:  No     PROVIDER TO CONSIDER COMPLETION OF PROBLEM LIST AND OVERVIEW/CONTROLLED SUBSTANCE AGREEMENT    Last Written Prescription Date:  9/29/2017  Last Fill Quantity: 30,   # refills: 0    Last Office Visit with [facility] primary care provider: 11/9/2017    Clinic visit frequency required: unspecified     Future Office visit:     Controlled substance agreement on file: No.     Processing:  may be called or faxed     checked in past 6 months?  No, route to RN no chronic pain    Do you want to advise office visit or E-visit    Thank you,  Derrick Centeno RN

## 2017-12-20 RX ORDER — ZOLPIDEM TARTRATE 5 MG/1
2.5-5 TABLET ORAL
Qty: 30 TABLET | Refills: 0 | Status: SHIPPED | OUTPATIENT
Start: 2017-12-20 | End: 2018-06-23

## 2017-12-21 ENCOUNTER — RADIANT APPOINTMENT (OUTPATIENT)
Dept: MAMMOGRAPHY | Facility: CLINIC | Age: 62
End: 2017-12-21
Attending: FAMILY MEDICINE
Payer: COMMERCIAL

## 2017-12-21 DIAGNOSIS — Z12.31 VISIT FOR SCREENING MAMMOGRAM: ICD-10-CM

## 2018-01-17 ENCOUNTER — TELEPHONE (OUTPATIENT)
Dept: FAMILY MEDICINE | Facility: CLINIC | Age: 63
End: 2018-01-17

## 2018-01-17 ENCOUNTER — OFFICE VISIT (OUTPATIENT)
Dept: FAMILY MEDICINE | Facility: CLINIC | Age: 63
End: 2018-01-17
Payer: COMMERCIAL

## 2018-01-17 VITALS
SYSTOLIC BLOOD PRESSURE: 140 MMHG | TEMPERATURE: 98 F | RESPIRATION RATE: 18 BRPM | HEART RATE: 65 BPM | BODY MASS INDEX: 31.12 KG/M2 | HEIGHT: 65 IN | WEIGHT: 186.8 LBS | DIASTOLIC BLOOD PRESSURE: 90 MMHG

## 2018-01-17 DIAGNOSIS — M79.651 PAIN OF RIGHT THIGH: Primary | ICD-10-CM

## 2018-01-17 DIAGNOSIS — M47.26 OSTEOARTHRITIS OF SPINE WITH RADICULOPATHY, LUMBAR REGION: ICD-10-CM

## 2018-01-17 PROCEDURE — 99213 OFFICE O/P EST LOW 20 MIN: CPT | Performed by: FAMILY MEDICINE

## 2018-01-17 NOTE — TELEPHONE ENCOUNTER
Reason for Call: Request for an order or referral:    Order or referral being requested: US order for right thigh pain    Date needed: as soon as possible    Has the patient been seen by the PCP for this problem? NO    Additional comments: Pt has not seen Dr. Nair regarding this. She has been having issues with her right thigh in pain. It hurts to walk and sleep. Pt would like an US order to see if she can do anything to help.    Phone number Patient can be reached at:  Cell number on file:    Telephone Information:   Mobile [phone]       Best Time:  Anytime    Can we leave a detailed message on this number?  YES    Call taken on 1/17/2018 at 12:24 PM by Mallika Moses

## 2018-01-17 NOTE — MR AVS SNAPSHOT
After Visit Summary   1/17/2018    Carol Barkley    MRN: [id number]           Patient Information     Date Of Birth          1955        Visit Information        Provider Department      1/17/2018 5:00 PM Gordon Bonilla MD [facility]        Today's Diagnoses     Pain of right thigh    -  1    Osteoarthritis of spine with radiculopathy, lumbar region          Care Instructions    It is reasonable to have an ultrasound tomorrow to rule out a DVT, but this is most consistent with a neurological cause.    Most common neurological cause of this type of pain are   Shingles - this will develop into a rash. If that happens we should start valtrex or acyclovir.  Back pain can radiate to nerves.  May consider a consult with ortho to consider MRI vs PT.    OK to use ibuprofen to manage while we take steps to identify the cause of your symptoms. Up to 800mg in 8 hours of ibuprofen. 100mg per hour is the calculation to use if you take less then 800mg at a time.     Consider a warm bath.          Follow-ups after your visit        Additional Services     ORTHO  REFERRAL       [facility] is referring you to the Orthopedic  Services at [facility].       The  Representative will assist you in the coordination of your Orthopedic and Musculoskeletal Care as prescribed by your physician.    The  Representative will call you within 1 business day to help schedule your appointment, or you may contact the  Representative at:    All areas ~ [phone]     Type of Referral : Spine: Lumbar  **Choose Medical Spine Specialist (unless patient was seen by a Medical Spine Specialist within the past 6 months).**  Surgical Evaluation is advised if the patient presents with one or more of the following red flags: Evidence of Spinal Tumor, Infection or Fracture, Cauda Equina Syndrome, Sudden or Progressive  Weakness, Loss of Bowel or Bladder Control, or any other documented emergent neurological condition resulting from a Lumbar Spinal Condition. Medical Spine Specialist        Timeframe requested: 1 - 2 days    Coverage of these services is subject to the terms and limitations of your health insurance plan.  Please call member services at your health plan with any benefit or coverage questions.      If X-rays, CT or MRI's have been performed, please contact the facility where they were done to arrange for , prior to your scheduled appointment.  Please bring this referral request to your appointment and present it to your specialist.                  Future tests that were ordered for you today     Open Future Orders        Priority Expected Expires Ordered    US Lower Extremity Venous Duplex Bilateral Routine  1/17/2019 1/17/2018            Who to contact     If you have questions or need follow up information about today's clinic visit or your schedule please contact [facility] directly at [phone].  Normal or non-critical lab and imaging results will be communicated to you by MyChart, letter or phone within 4 business days after the clinic has received the results. If you do not hear from us within 7 days, please contact the clinic through [facility] or phone. If you have a critical or abnormal lab result, we will notify you by phone as soon as possible.  Submit refill requests through [facility] or call your pharmacy and they will forward the refill request to us. Please allow 3 business days for your refill to be completed.          Additional Information About Your Visit        [facility] Information     [facility] gives you secure access to your electronic health record. If you see a primary care provider, you can also send messages to your care team and make appointments. If you have questions, please call your primary care clinic.  If you do not have a primary care provider, please call  "[phone] and they will assist you.        Care EveryWhere ID     This is your Care EveryWhere ID. This could be used by other organizations to access your [place] medical records  [id number]        Your Vitals Were     Pulse Temperature Respirations Height Last Period BMI (Body Mass Index)    65 98  F (36.7  C) (Oral) 18 5' 5\" (1.651 m) 11/30/2004 31.09 kg/m2       Blood Pressure from Last 3 Encounters:   01/17/18 145/86   11/09/17 133/82   11/02/17 142/85    Weight from Last 3 Encounters:   01/17/18 186 lb 12.8 oz (84.7 kg)   11/09/17 185 lb (83.9 kg)   11/02/17 180 lb (81.6 kg)              We Performed the Following     ORTHO  REFERRAL        Primary Care Provider Office Phone # Fax #    Andrew Nair [phone] [address]  [facility] [address]        Equal Access to Services     [facility] AH: Hadii aad ku hadasho Soomaali, waaxda luqadaha, qaybta kaalmada adeegyada, waxay idiin haylluvian [place] . So [facility] [phone].    ATENCIÓN: Si habla español, tiene a waldron disposición servicios gratuitos de asistencia lingüística. [facility] [phone].    We comply with applicable federal civil rights laws and [place] laws. We do not discriminate on the basis of race, color, national origin, age, disability, sex, sexual orientation, or gender identity.            Thank you!     Thank you for choosing [facility]  for your care. Our goal is always to provide you with excellent care. Hearing back from our patients is one way we can continue to improve our services. Please take a few minutes to complete the written survey that you may receive in the mail after your visit with us. Thank you!             Your Updated Medication List - Protect others around you: Learn how to safely use, store and throw away your medicines at www.disposemymeds.org.          This list is accurate as of: 1/17/18  5:53 PM.  Always use your most recent med list.                "    Brand Name Dispense Instructions for use Diagnosis    aspirin 81 MG tablet     100    ONE DAILY    Pure hypercholesterolemia       clobetasol 0.05 % ointment    TEMOVATE          fluocinolone 0.01 % solution    SYNALAR          fluticasone 50 MCG/ACT spray    FLONASE    16 g    Spray 2 sprays into both nostrils daily    Seasonal allergic rhinitis       PREMARIN cream   Generic drug:  conjugated estrogens           SUMAtriptan 100 MG tablet    IMITREX    9 tablet    Take 1 tablet by mouth. May repeat in 2 hours if needed: max 2/day; average number of headaches monthly    Migraine without status migrainosus, not intractable, unspecified migraine type       tretinoin 0.025 % cream    RETIN-A          zolpidem 5 MG tablet    AMBIEN    30 tablet    Take 0.5-1 tablets (2.5-5 mg) by mouth nightly as needed for sleep    Other insomnia

## 2018-01-17 NOTE — PATIENT INSTRUCTIONS
It is reasonable to have an ultrasound tomorrow to rule out a DVT, but this is most consistent with a neurological cause.    Most common neurological cause of this type of pain are   Shingles - this will develop into a rash. If that happens we should start valtrex or acyclovir.  Back pain can radiate to nerves.  May consider a consult with ortho to consider MRI vs PT.    OK to use ibuprofen to manage while we take steps to identify the cause of your symptoms. Up to 800mg in 8 hours of ibuprofen. 100mg per hour is the calculation to use if you take less then 800mg at a time.     Consider a warm bath.

## 2018-01-17 NOTE — PROGRESS NOTES
"  SUBJECTIVE:   Carol Barkley is a 62 year old female who presents to clinic today for the following health issues:        Musculoskeletal problem/pain      Duration: 2 days    Description  Location: right leg    Intensity:  moderate, severe    Accompanying signs and symptoms: none    History  Previous similar problem: no   Previous evaluation:  none    Precipitating or alleviating factors:  Trauma or overuse: no   Aggravating factors include: none    Therapies tried and outcome: nothing      Has had pain on the inside of her leg for awhile. The difference last night was that she couldn't sleep. It was an 8-10. Didn't matter what position she was in. She is worried about a blood clot.    She has strength intact, hasn't given out.    The leg feels like it is on fire. Started last night. Nothing like this before.    No other pain in her body.    No injuries.    ROS  No fever  No fatigue  No cough    EXAM:  /90  Pulse 65  Temp 98  F (36.7  C) (Oral)  Resp 18  Ht 5' 5\" (1.651 m)  Wt 186 lb 12.8 oz (84.7 kg)  LMP 11/30/2004  BMI 31.09 kg/m2  Constitutional: Healthy, alert, no distress   Cardiovascular: RRR. No murmurs   Respiratory: Clear to auscultation   Musculoskeletal: uncomfortable when positioning onto back. No tenderness of lumbar spine to palpation. Difficulty with movement of right leg secondary to back pain and leg pain when in supine position. No rash noted on leg, no weakness appreciated. No cord or palpable mass noted, but some tenderness to palpation of medial right thigh.    ASSESSMENT    ICD-10-CM    1. Pain of right thigh M79.651 US Lower Extremity Venous Duplex Bilateral     ORTHO  REFERRAL   2. Osteoarthritis of spine with radiculopathy, lumbar region M47.26 ORTHO  REFERRAL      Plan:  Patient Instructions   It is reasonable to have an ultrasound tomorrow to rule out a DVT, but this is most consistent with a neurological cause.    Most common neurological cause of this " type of pain are   Shingles - this will develop into a rash. If that happens we should start valtrex or acyclovir.  Back pain can radiate to nerves.  May consider a consult with ortho to consider MRI vs PT.    OK to use ibuprofen to manage while we take steps to identify the cause of your symptoms. Up to 800mg in 8 hours of ibuprofen. 100mg per hour is the calculation to use if you take less then 800mg at a time.     Consider a warm bath.     Gordon Zamudio MD  Family Medicine Physician

## 2018-01-18 ENCOUNTER — RADIANT APPOINTMENT (OUTPATIENT)
Dept: ULTRASOUND IMAGING | Facility: CLINIC | Age: 63
End: 2018-01-18
Attending: FAMILY MEDICINE
Payer: COMMERCIAL

## 2018-01-18 DIAGNOSIS — M79.651 PAIN OF RIGHT THIGH: ICD-10-CM

## 2018-07-09 ENCOUNTER — TRANSFERRED RECORDS (OUTPATIENT)
Dept: HEALTH INFORMATION MANAGEMENT | Facility: CLINIC | Age: 63
End: 2018-07-09

## 2018-08-08 ENCOUNTER — TRANSFERRED RECORDS (OUTPATIENT)
Dept: HEALTH INFORMATION MANAGEMENT | Facility: CLINIC | Age: 63
End: 2018-08-08

## 2018-08-15 ENCOUNTER — TELEPHONE (OUTPATIENT)
Dept: FAMILY MEDICINE | Facility: CLINIC | Age: 63
End: 2018-08-15

## 2018-08-15 NOTE — TELEPHONE ENCOUNTER
Writer notes diagnostic imaging ordered - called [facility] - states he is currently on other line assisting patient to schedule appointment    Closing encounter - no further actions needed at this time    Derrick Centeno RN

## 2018-08-20 ENCOUNTER — RADIANT APPOINTMENT (OUTPATIENT)
Dept: MAMMOGRAPHY | Facility: CLINIC | Age: 63
End: 2018-08-20
Attending: FAMILY MEDICINE
Payer: COMMERCIAL

## 2018-08-20 DIAGNOSIS — N64.4 BREAST PAIN, RIGHT: ICD-10-CM

## 2018-08-20 DIAGNOSIS — N63.10 LUMP OF RIGHT BREAST: ICD-10-CM

## 2018-10-11 DIAGNOSIS — G47.09 OTHER INSOMNIA: ICD-10-CM

## 2018-10-11 RX ORDER — ZOLPIDEM TARTRATE 5 MG/1
2.5-5 TABLET ORAL
Qty: 30 TABLET | Refills: 0 | Status: SHIPPED | OUTPATIENT
Start: 2018-10-11 | End: 2018-12-21

## 2018-10-11 NOTE — TELEPHONE ENCOUNTER
ambien refill request  Last refill 1/25/18 for #30  Last office visit 6/18last cpe 11/17 with Andrew Nair MD    Thanks!     Leticia Todd RN

## 2018-12-20 DIAGNOSIS — G47.09 OTHER INSOMNIA: ICD-10-CM

## 2018-12-20 NOTE — LETTER
[address] [address]  Phone: [phone]    12/26/18    Carol Barkley  [address]  [facility] [address]      To whom it may concern:     In order to ensure we are providing the best quality care, we have reviewed your chart and see that you are due for an annual physical.    We have also sent your zolpidem (AMBIEN) 5 MG tablet medication to your pharmacy. For future refills, please contact your primary care clinic to schedule a yearly physical appointment. Thank you!    We greatly appreciate the opportunity to serve you. Thank you for trusting us with your health care.      Sincerely,      Your care team at [facility]

## 2018-12-21 RX ORDER — ZOLPIDEM TARTRATE 5 MG/1
2.5-5 TABLET ORAL
Qty: 30 TABLET | Refills: 0 | Status: SHIPPED | OUTPATIENT
Start: 2018-12-21 | End: 2022-04-18

## 2018-12-21 NOTE — TELEPHONE ENCOUNTER
Routing refill request to provider for review/approval because:  Drug not on the [facility] refill protocol   Last visit 1/2018. Last refills 10/11/2018      Reception please call and schedule yearly visit.  thanks

## 2018-12-21 NOTE — TELEPHONE ENCOUNTER
LM to return clinic phone call. Patient is due for annual OV.      Che TRONCOSO     [facility]

## 2018-12-24 NOTE — TELEPHONE ENCOUNTER
Left message notifying patient that rx was approved for a month and she's due for an annual physical.    Yodit Diaz

## 2019-03-22 ENCOUNTER — HEALTH MAINTENANCE LETTER (OUTPATIENT)
Age: 64
End: 2019-03-22

## 2019-10-03 ENCOUNTER — HEALTH MAINTENANCE LETTER (OUTPATIENT)
Age: 64
End: 2019-10-03

## 2019-11-08 ENCOUNTER — OFFICE VISIT (OUTPATIENT)
Dept: FAMILY MEDICINE | Facility: CLINIC | Age: 64
End: 2019-11-08
Payer: COMMERCIAL

## 2019-11-08 ENCOUNTER — ANCILLARY PROCEDURE (OUTPATIENT)
Dept: MAMMOGRAPHY | Facility: CLINIC | Age: 64
End: 2019-11-08
Attending: FAMILY MEDICINE
Payer: COMMERCIAL

## 2019-11-08 VITALS
WEIGHT: 181 LBS | RESPIRATION RATE: 18 BRPM | DIASTOLIC BLOOD PRESSURE: 82 MMHG | SYSTOLIC BLOOD PRESSURE: 130 MMHG | BODY MASS INDEX: 30.12 KG/M2 | HEART RATE: 92 BPM | OXYGEN SATURATION: 100 % | TEMPERATURE: 98.5 F

## 2019-11-08 DIAGNOSIS — K21.9 GASTROESOPHAGEAL REFLUX DISEASE, ESOPHAGITIS PRESENCE NOT SPECIFIED: Primary | ICD-10-CM

## 2019-11-08 DIAGNOSIS — Z12.31 VISIT FOR SCREENING MAMMOGRAM: ICD-10-CM

## 2019-11-08 DIAGNOSIS — R07.0 THROAT PAIN: ICD-10-CM

## 2019-11-08 DIAGNOSIS — Z13.220 LIPID SCREENING: ICD-10-CM

## 2019-11-08 PROCEDURE — 84443 ASSAY THYROID STIM HORMONE: CPT | Performed by: FAMILY MEDICINE

## 2019-11-08 PROCEDURE — 99214 OFFICE O/P EST MOD 30 MIN: CPT | Performed by: FAMILY MEDICINE

## 2019-11-08 PROCEDURE — 80061 LIPID PANEL: CPT | Performed by: FAMILY MEDICINE

## 2019-11-08 PROCEDURE — 36415 COLL VENOUS BLD VENIPUNCTURE: CPT | Performed by: FAMILY MEDICINE

## 2019-11-08 NOTE — PROGRESS NOTES
"Subjective     Carol Barkley is a 64 year old female who presents to clinic today for the following health issues:    HPI   Patient in today with a consistent sore throat for about 6 months, notes it could be contributed to GERD, some times feels like there is \"something stuck\" and it is hard to swallow. Notes no other symptoms.    she is on nasal steroid for post nasal drip/allergies.     Reviewed and updated as needed this visit by Provider         Review of Systems   No resp symptoms.  No const nor them symptoms. Concerned a bit this may be thyroid related.        Objective    /82   Pulse 92   Temp 98.5  F (36.9  C) (Tympanic)   Resp 18   Wt 82.1 kg (181 lb)   LMP 11/30/2004   SpO2 100%   BMI 30.12 kg/m    Body mass index is 30.12 kg/m .  Physical Exam   GENERAL: healthy, alert and no distress  EYES: Eyes grossly normal to inspection, PERRL and conjunctivae and sclerae normal  HENT: ear canals and TM's normal, nose and mouth without ulcers or lesions  NECK: no adenopathy, no asymmetry, masses, or scars and thyroid normal to palpation  RESP: lungs clear to auscultation - no rales, rhonchi or wheezes  CV: regular rate and rhythm, normal S1 S2, no S3 or S4, no murmur, click or rub, no peripheral edema and peripheral pulses strong  ABDOMEN: soft, nontender, no hepatosplenomegaly, no masses and bowel sounds normal  MS: no gross musculoskeletal defects noted, no edema    Diagnostic Test Results:  Labs reviewed in Epic        Assessment & Plan     1. Gastroesophageal reflux disease, esophagitis presence not specified  May be the cause of her throat pain. Disc beh mod. She is doing this already. Will add in prilosec for 2 weeks as trial  - omeprazole (PRILOSEC) 20 MG DR capsule; Take 1 capsule (20 mg) by mouth 2 times daily  Dispense: 30 capsule; Refill: 0    2. Throat pain   likely related to gerd v allergies. To ENT if symptoms are not responding.   - TSH with free T4 reflex  - OTOLARYNGOLOGY " REFERRAL    3. Lipid screening     - Lipid panel reflex to direct LDL Fasting     Return in about 3 weeks (around 11/29/2019) for follow up appointment with ENT.    Andrew Nair MD  [facility]

## 2019-11-09 LAB
CHOLEST SERPL-MCNC: 226 MG/DL
HDLC SERPL-MCNC: 51 MG/DL
LDLC SERPL CALC-MCNC: 129 MG/DL
NONHDLC SERPL-MCNC: 175 MG/DL
TRIGL SERPL-MCNC: 228 MG/DL
TSH SERPL DL<=0.005 MIU/L-ACNC: 1.72 MU/L (ref 0.4–4)

## 2019-11-12 ENCOUNTER — DOCUMENTATION ONLY (OUTPATIENT)
Dept: CARE COORDINATION | Facility: CLINIC | Age: 64
End: 2019-11-12

## 2019-11-12 NOTE — TELEPHONE ENCOUNTER
FUTURE VISIT INFORMATION      FUTURE VISIT INFORMATION:    Date: 12/5/19    Time: 9:30 AM    Location: [facility]  REFERRAL INFORMATION:    Referring provider:  Dr. Andrew Nair    Referring providers clinic:  [facility]    Reason for visit/diagnosis  Throat Pain    RECORDS REQUESTED FROM:       Clinic name Comments Records Status Imaging Status   [facility] 11/8/19 - Referral OV with Dr. Nair Epic                                    * 11/12/19 3:48 PM Faxed Request to [facility] for Xray images to be mailed out - Noemi  ** The scanned in images were for a different patient HIM has been notified and they should be removed.

## 2019-12-05 ENCOUNTER — PRE VISIT (OUTPATIENT)
Dept: OTOLARYNGOLOGY | Facility: CLINIC | Age: 64
End: 2019-12-05

## 2019-12-05 ENCOUNTER — OFFICE VISIT (OUTPATIENT)
Dept: OTOLARYNGOLOGY | Facility: CLINIC | Age: 64
End: 2019-12-05
Payer: COMMERCIAL

## 2019-12-05 VITALS — WEIGHT: 184 LBS | HEIGHT: 66 IN | BODY MASS INDEX: 29.57 KG/M2

## 2019-12-05 DIAGNOSIS — K21.9 GASTROESOPHAGEAL REFLUX DISEASE WITHOUT ESOPHAGITIS: ICD-10-CM

## 2019-12-05 DIAGNOSIS — R22.1 NECK MASS: Primary | ICD-10-CM

## 2019-12-05 DIAGNOSIS — R13.10 DYSPHAGIA, UNSPECIFIED TYPE: ICD-10-CM

## 2019-12-05 ASSESSMENT — MIFFLIN-ST. JEOR: SCORE: 1396.37

## 2019-12-05 ASSESSMENT — PAIN SCALES - GENERAL: PAINLEVEL: SEVERE PAIN (7)

## 2019-12-05 NOTE — PATIENT INSTRUCTIONS
1.  You were seen in the ENT Clinic today by Dr. Byers.  If you have any questions or concerns after your appointment, please call [phone]. Press option #1 for scheduling related needs. Press option #3 for Nurse advice.    2.  Please schedule an appointment for the following:   - CT Scan - Neck   - Labs - BUN and Creatinine   - [facility]    3.  Plan is to return to clinic to see Dr. Byers AFTER completion of above appointments.      Aaliyah Reynolds LPN  [facility] Otolaryngology  [phone]    The patient presents with a history of a globus sensation and mild dysphagia. She will be referred for a CT scan of the neck and a Gastroenterology consultation and she will be seen again after these evaluations are completed.

## 2019-12-05 NOTE — PROGRESS NOTES
The patient presents with a history of a globus sensation and dysphagia that began a few months ago. He reports that the right side of the neck is worse than the left. She reports some odynophagia.  The patient denies hemoptysis, hematemasis, or unexplained weight loss.  The patient reports that the symptoms have been progressively worsening over the past few months.  The patient reports symptoms of heartburn intermittently and her father had Munoz's esophagus.  The patient denies sinusitis, rhinitis, facial pain, nasal obstruction or purulent nasal discharge. The patient denies chronic or recurrent tonsillitis, chronic or recurrent pharyngitis. The patient denies otalgia, otorrhea, eustachian tube dysfunction, ear infections, dizziness or tinnitus.     This patient is seen in consultation at the request of Dr. Andrew Nair.    All other systems were reviewed and they are either negative or they are not directly pertinent to this Otolaryngology examination.      Past Medical History:    Past Medical History:   Diagnosis Date     Allergic rhinitis due to other allergen     Immunotherapy     CKD (chronic kidney disease) stage 2, GFR 60-89 ml/min      Cough     seen by Dr Freire and ENT, CXR NL     Cystourethrocele      Hearing loss      Lichen sclerosus et atrophicus of the vulva     Jama     Migraine without aura, without mention of intractable migraine without mention of status migrainosus      PAC (premature atrial contraction) 12/2009    Holter     Personal history of colonic polyps      Pure hypercholesterolemia      PVD (posterior vitreous detachment)      Rectocele      Unspecified sinusitis (chronic)      Urine, incontinence, stress female        Past Surgical History:    Past Surgical History:   Procedure Laterality Date     BREAST BIOPSY, RT/LT       C APPENDECTOMY       C NARANJO W/O FACETEC FORAMOT/DSKC 1/2 VRT SEG, LUMBAR      L5-S1     C STRESS ECHO (TREADMILL) FL  4/2006     COLONOSCOPY  01/2014      COLONOSCOPY  1/8/2014    Procedure: COLONOSCOPY;  colonoscopy  ;  Surgeon: Jose Newton MD;  Location:  GI     HC REMOVAL GALLBLADDER      Cholecystectomy     HYSTERECTOMY VAGINAL  5/17/2011    w/ bilateral salphingo-oophorectomy, eterocele reapir w/ Jamie culdoplasty, anterior colporrhaphy, posterior colpoperineorrhaphy, pubovaginal sling (mini-Arc) placement, cystoscopy and suprapubic catheter palcement     HYSTERECTOMY, PAP NO LONGER INDICATED       HYSTEROSCOPY  2/2007    D&C     TUBAL LIGATION  1989       Medications:      Current Outpatient Medications:      clobetasol (TEMOVATE) 0.05 % ointment, , Disp: , Rfl:      fluocinolone (SYNALAR) 0.01 % external solution, , Disp: , Rfl:      omeprazole (PRILOSEC) 20 MG DR capsule, Take 1 capsule (20 mg) by mouth 2 times daily, Disp: 30 capsule, Rfl: 0     PREMARIN vaginal cream, , Disp: , Rfl:      SUMAtriptan (IMITREX) 100 MG tablet, Take 1 tablet by mouth. May repeat in 2 hours if needed: max 2/day; average number of headaches monthly, Disp: 9 tablet, Rfl: 11     tretinoin (RETIN-A) 0.025 % cream, , Disp: , Rfl:      zolpidem (AMBIEN) 5 MG tablet, Take 0.5-1 tablets (2.5-5 mg) by mouth nightly as needed for sleep, Disp: 30 tablet, Rfl: 0    Allergies:    No known drug allergies    Physical Examination:    The patient is a well developed, well nourished female in no apparent distress.  She is normocephalic, atraumatic with pupils equally round and reactive to light.    Oral Cavity Examination:  Normal mucosa with no masses or lesions  Nasal Examination: Normal mucosa with no masses or lesions  Ear Examination: Ear canals clear, tympanic membranes and middle ear spaces normal  Neurological Examination: Facial nerve function intact and symmetric  Integumentary Examination: No lesions on the skin of the head and neck  Neck Examination: No masses or lesions, no lymphadenopathy  Endocrine Examination: Normal thyroid examination  Flexible Fiberoptic  Laryngoscopy: Normal nasopharynx, base of tongue, valleculae, pyriform sinuses, false vocal cords and true vocal cords.  The vocal cords are moving normally and there are no lesions or masses in the larynx.  The posterior vocal cords and laryngeal wall are erythematous, consistent with reflux irritation.    Assessment and Plan:    The patient presents with a history of a globus sensation and mild dysphagia. She will be referred for a CT scan of the neck and a Gastroenterology consultation and she will be seen again after these evaluations are completed.       PreOp Diagnosis:  Dysphagia    PostOp Diagnosis: Dysphagia    Procedure: Flexible Fiberoptic Laryngoscopy    Estimated Blood Loss: None    Complications: None    Consent: The patient was informed of the possible complications and probable outcomes of the procedure and the patient gave informed consent.    Fluids: None    Drains: None    Disposition: to home    Findings: Normal nasopharynx, base of tongue, pyriform sinuses, epiglottis, valleculae, false vocal cords, true vocal cords, and larynx.  Normal motion of the vocal cords with no lesions, masses, nodules, or polyps bilaterally.     Description of Procedure:    The patient was positioned on the clinic room chair. The nose was decongested and anesthetized with 2 puffs in each nostrils lidocaine hcl 4% and oxymetazoline hcl 0.05% topical solution. The flexible fiberoptic scope was passed into the each nostrils and the nasal passages visualized. The scope was passed through the left nostril into the nasopharynx which was normal. The base of tongue and tonsil tissues were visualized and found to be normal. The vocal cords and larynx were visualized and the true vocal cords were visualized and found to be normal.       CC: Dr. Andrew Nair

## 2019-12-05 NOTE — NURSING NOTE
"Chief Complaint   Patient presents with     Consult     Throat pain     Height 1.676 m (5' 6\"), weight 83.5 kg (184 lb), last menstrual period 11/30/2004, not currently breastfeeding.    Deb Avendano, EMT  "

## 2019-12-05 NOTE — LETTER
12/5/2019       RE: Carol Barkley  [address]  [facility] [address]     Dear Colleague,    Thank you for referring your patient, Carol Barkley, to the [facility] E[facility]. Please see a copy of my visit note below.    The patient presents with a history of a globus sensation and dysphagia that began a few months ago. He reports that the right side of the neck is worse than the left. She reports some odynophagia.  The patient denies hemoptysis, hematemasis, or unexplained weight loss.  The patient reports that the symptoms have been progressively worsening over the past few months.  The patient reports symptoms of heartburn intermittently and her father had Munoz's esophagus.  The patient denies sinusitis, rhinitis, facial pain, nasal obstruction or purulent nasal discharge. The patient denies chronic or recurrent tonsillitis, chronic or recurrent pharyngitis. The patient denies otalgia, otorrhea, eustachian tube dysfunction, ear infections, dizziness or tinnitus.     This patient is seen in consultation at the request of Dr. Andrew Nair.    All other systems were reviewed and they are either negative or they are not directly pertinent to this Otolaryngology examination.      Past Medical History:    Past Medical History:   Diagnosis Date     Allergic rhinitis due to other allergen     Immunotherapy     CKD (chronic kidney disease) stage 2, GFR 60-89 ml/min      Cough     seen by Dr Freire and ENT, CXR NL     Cystourethrocele      Hearing loss      Lichen sclerosus et atrophicus of the vulva     [place]     Migraine without aura, without mention of intractable migraine without mention of status migrainosus      PAC (premature atrial contraction) 12/2009    Holter     Personal history of colonic polyps      Pure hypercholesterolemia      PVD (posterior vitreous detachment)      Rectocele      Unspecified sinusitis (chronic)      Urine, incontinence, stress  female        Past Surgical History:    Past Surgical History:   Procedure Laterality Date     BREAST BIOPSY, RT/LT       C APPENDECTOMY       C NARANJO W/O FACETEC FORAMOT/[facility]KC 1/2 VRT SEG, LUMBAR      L5-S1     C STRESS ECHO (TREADMILL) [place]  4/2006     COLONOSCOPY  01/2014     COLONOSCOPY  1/8/2014    Procedure: COLONOSCOPY;  colonoscopy  ;  Surgeon: Jose Newton MD;  Location: [facility]     HC REMOVAL GALLBLADDER      Cholecystectomy     HYSTERECTOMY VAGINAL  5/17/2011    w/ bilateral salphingo-oophorectomy, eterocele reapir w/ [facility] culdoplasty, anterior colporrhaphy, posterior colpoperineorrhaphy, pubovaginal sling (mini-Arc) placement, cystoscopy and suprapubic catheter palcement     HYSTERECTOMY, PAP NO LONGER INDICATED       HYSTEROSCOPY  2/2007    D&C     TUBAL LIGATION  1989       Medications:      Current Outpatient Medications:      clobetasol (TEMOVATE) 0.05 % ointment, , Disp: , Rfl:      fluocinolone (SYNALAR) 0.01 % external solution, , Disp: , Rfl:      omeprazole (PRILOSEC) 20 MG DR capsule, Take 1 capsule (20 mg) by mouth 2 times daily, Disp: 30 capsule, Rfl: 0     PREMARIN vaginal cream, , Disp: , Rfl:      SUMAtriptan (IMITREX) 100 MG tablet, Take 1 tablet by mouth. May repeat in 2 hours if needed: max 2/day; average number of headaches monthly, Disp: 9 tablet, Rfl: 11     tretinoin (RETIN-A) 0.025 % cream, , Disp: , Rfl:      zolpidem (AMBIEN) 5 MG tablet, Take 0.5-1 tablets (2.5-5 mg) by mouth nightly as needed for sleep, Disp: 30 tablet, Rfl: 0    Allergies:    No known drug allergies    Physical Examination:    The patient is a well developed, well nourished female in no apparent distress.  She is normocephalic, atraumatic with pupils equally round and reactive to light.    Oral Cavity Examination:  Normal mucosa with no masses or lesions  Nasal Examination: Normal mucosa with no masses or lesions  Ear Examination: Ear canals clear, tympanic membranes and middle ear spaces  normal  Neurological Examination: Facial nerve function intact and symmetric  Integumentary Examination: No lesions on the skin of the head and neck  Neck Examination: No masses or lesions, no lymphadenopathy  Endocrine Examination: Normal thyroid examination  Flexible Fiberoptic Laryngoscopy: Normal nasopharynx, base of tongue, valleculae, pyriform sinuses, false vocal cords and true vocal cords.  The vocal cords are moving normally and there are no lesions or masses in the larynx.  The posterior vocal cords and laryngeal wall are erythematous, consistent with reflux irritation.    Assessment and Plan:    The patient presents with a history of a globus sensation and mild dysphagia. She will be referred for a CT scan of the neck and a Gastroenterology consultation and she will be seen again after these evaluations are completed.       PreOp Diagnosis:  Dysphagia    PostOp Diagnosis: Dysphagia    Procedure: Flexible Fiberoptic Laryngoscopy    Estimated Blood Loss: None    Complications: None    Consent: The patient was informed of the possible complications and probable outcomes of the procedure and the patient gave informed consent.    Fluids: None    Drains: None    Disposition: to home    Findings: Normal nasopharynx, base of tongue, pyriform sinuses, epiglottis, valleculae, false vocal cords, true vocal cords, and larynx.  Normal motion of the vocal cords with no lesions, masses, nodules, or polyps bilaterally.     Description of Procedure:    The patient was positioned on the clinic room chair. The nose was decongested and anesthetized with 2 puffs in each nostrils lidocaine hcl 4% and oxymetazoline hcl 0.05% topical solution. The flexible fiberoptic scope was passed into the each nostrils and the nasal passages visualized. The scope was passed through the left nostril into the nasopharynx which was normal. The base of tongue and tonsil tissues were visualized and found to be normal. The vocal cords and larynx  were visualized and the true vocal cords were visualized and found to be normal.       CC: Dr. Andrew Nair      Again, thank you for allowing me to participate in the care of your patient.      Sincerely,    Jhony Byers MD

## 2019-12-06 ENCOUNTER — ANCILLARY PROCEDURE (OUTPATIENT)
Dept: CT IMAGING | Facility: CLINIC | Age: 64
End: 2019-12-06
Attending: OTOLARYNGOLOGY
Payer: COMMERCIAL

## 2019-12-06 DIAGNOSIS — R22.1 NECK MASS: ICD-10-CM

## 2019-12-06 LAB
BUN SERPL-MCNC: 15 MG/DL (ref 7–30)
CREAT SERPL-MCNC: 0.8 MG/DL (ref 0.52–1.04)
GFR SERPL CREATININE-BSD FRML MDRD: 77 ML/MIN/{1.73_M2}

## 2019-12-06 RX ORDER — IOPAMIDOL 755 MG/ML
100 INJECTION, SOLUTION INTRAVASCULAR ONCE
Status: COMPLETED | OUTPATIENT
Start: 2019-12-06 | End: 2019-12-06

## 2019-12-06 RX ADMIN — IOPAMIDOL 100 ML: 755 INJECTION, SOLUTION INTRAVASCULAR at 16:26

## 2019-12-10 ENCOUNTER — MYC MEDICAL ADVICE (OUTPATIENT)
Dept: OTOLARYNGOLOGY | Facility: CLINIC | Age: 64
End: 2019-12-10

## 2019-12-10 ENCOUNTER — TRANSFERRED RECORDS (OUTPATIENT)
Dept: HEALTH INFORMATION MANAGEMENT | Facility: CLINIC | Age: 64
End: 2019-12-10

## 2019-12-11 ENCOUNTER — TRANSFERRED RECORDS (OUTPATIENT)
Dept: HEALTH INFORMATION MANAGEMENT | Facility: CLINIC | Age: 64
End: 2019-12-11

## 2019-12-16 ENCOUNTER — TRANSFERRED RECORDS (OUTPATIENT)
Dept: HEALTH INFORMATION MANAGEMENT | Facility: CLINIC | Age: 64
End: 2019-12-16

## 2019-12-16 ENCOUNTER — HEALTH MAINTENANCE LETTER (OUTPATIENT)
Age: 64
End: 2019-12-16

## 2020-05-05 NOTE — TELEPHONE ENCOUNTER
Controlled Substance Refill Request for ZOLPIDEM 5 MG  Problem List Complete:  No     PROVIDER TO CONSIDER COMPLETION OF PROBLEM LIST AND OVERVIEW/CONTROLLED SUBSTANCE AGREEMENT    Medication Detail      Disp Refills Start End TIMOTHY   zolpidem (AMBIEN) 5 MG tablet 30 tablet 0 6/28/2017  No   Sig: Take 0.5-1 tablets (2.5-5 mg) by mouth nightly as needed for sleep . Add'l refills to be approved by PCP.         Last Office Visit with [facility] primary care provider: 3-24-17    Future Office visit:     Controlled substance agreement on file: No.     Processing:  .     checked in past 6 months?  No, route to RN   denies pain/discomfort

## 2021-01-15 ENCOUNTER — HEALTH MAINTENANCE LETTER (OUTPATIENT)
Age: 66
End: 2021-01-15

## 2021-02-21 DIAGNOSIS — G43.909 MIGRAINE WITHOUT STATUS MIGRAINOSUS, NOT INTRACTABLE, UNSPECIFIED MIGRAINE TYPE: ICD-10-CM

## 2021-02-25 RX ORDER — SUMATRIPTAN 100 MG/1
TABLET, FILM COATED ORAL
Qty: 9 TABLET | Refills: 0 | Status: SHIPPED | OUTPATIENT
Start: 2021-02-25 | End: 2022-12-13

## 2021-02-25 NOTE — TELEPHONE ENCOUNTER
,  --Please contact patient and ask to schedule annual visit/physical.      ---Prescription approved per [facility] Refill Protocol.       Brianna Chatterjee RN BSN     [facility]                          --Last visit:  11/8/2019 .    --Future Visit: NONE

## 2021-09-05 ENCOUNTER — HEALTH MAINTENANCE LETTER (OUTPATIENT)
Age: 66
End: 2021-09-05

## 2022-02-20 ENCOUNTER — HEALTH MAINTENANCE LETTER (OUTPATIENT)
Age: 67
End: 2022-02-20

## 2022-04-18 ENCOUNTER — NURSE TRIAGE (OUTPATIENT)
Dept: NURSING | Facility: CLINIC | Age: 67
End: 2022-04-18
Payer: COMMERCIAL

## 2022-04-18 ENCOUNTER — OFFICE VISIT (OUTPATIENT)
Dept: FAMILY MEDICINE | Facility: CLINIC | Age: 67
End: 2022-04-18
Payer: COMMERCIAL

## 2022-04-18 VITALS
OXYGEN SATURATION: 100 % | SYSTOLIC BLOOD PRESSURE: 152 MMHG | BODY MASS INDEX: 30.08 KG/M2 | TEMPERATURE: 98.2 F | WEIGHT: 187.2 LBS | HEART RATE: 65 BPM | HEIGHT: 66 IN | DIASTOLIC BLOOD PRESSURE: 84 MMHG

## 2022-04-18 DIAGNOSIS — R03.0 ELEVATED BLOOD PRESSURE READING WITHOUT DIAGNOSIS OF HYPERTENSION: ICD-10-CM

## 2022-04-18 DIAGNOSIS — Z12.31 ENCOUNTER FOR SCREENING MAMMOGRAM FOR BREAST CANCER: ICD-10-CM

## 2022-04-18 DIAGNOSIS — R07.81 RIB PAIN ON LEFT SIDE: Primary | ICD-10-CM

## 2022-04-18 DIAGNOSIS — Z12.11 SPECIAL SCREENING FOR MALIGNANT NEOPLASMS, COLON: ICD-10-CM

## 2022-04-18 PROCEDURE — 99214 OFFICE O/P EST MOD 30 MIN: CPT | Performed by: FAMILY MEDICINE

## 2022-04-18 RX ORDER — HYDROCODONE BITARTRATE AND ACETAMINOPHEN 5; 325 MG/1; MG/1
1 TABLET ORAL EVERY 6 HOURS PRN
Qty: 10 TABLET | Refills: 0 | Status: SHIPPED | OUTPATIENT
Start: 2022-04-18 | End: 2022-04-21

## 2022-04-18 ASSESSMENT — PAIN SCALES - GENERAL: PAINLEVEL: EXTREME PAIN (8)

## 2022-04-18 NOTE — PROGRESS NOTES
"  Assessment & Plan     Rib pain on left side  She may have fractured a rib along the left side.  I did put in an order for x-rays and issued a short prescription for hydrocodone to use primarily before bedtime with ibuprofen during the day.  Indications for follow-up and expected course of improvement were reviewed with the patient.  - XR Ribs & Chest Left G/E 3 Views; Future  - HYDROcodone-acetaminophen (NORCO) 5-325 MG tablet; Take 1 tablet by mouth every 6 hours as needed for severe pain    Encounter for screening mammogram for breast cancer  She is due for a mammogram and this was discussed with the patient with placement of an order today.  - MA SCREENING DIGITAL BILAT - Future  (s+30); Future    Special screening for malignant neoplasms, colon  She is also due for colon cancer screening and so this was discussed with the patient and an order was placed today for screening colonoscopy.  - Adult Gastro Ref - Procedure Only; Future    Elevated blood pressure reading without diagnosis of hypertension  Blood pressure was improved with recheck.  She has a blood pressure cuff at home and I have suggested she continue to monitor it and then follow-up in 1 to 2 months for an annual wellness visit.               BMI:   Estimated body mass index is 30.21 kg/m  as calculated from the following:    Height as of this encounter: 1.676 m (5' 6\").    Weight as of this encounter: 84.9 kg (187 lb 3.2 oz).           Return in about 2 months (around 6/18/2022) for Wellness Visit.    Poncho Ross MD  [facility] Hospital[facility]SE L Medina is a 67 year old who presents for the following health issues     History of Present Illness       Reason for visit:  Injury  Symptom onset:  3-7 days ago    She eats 4 or more servings of fruits and vegetables daily.She consumes 0 sweetened beverage(s) daily.She exercises with enough effort to increase her heart rate 20 to 29 minutes per day.  She exercises with " "enough effort to increase her heart rate 5 days per week.   She is taking medications regularly.         Patient got sick with what sounds like norovirus approximately 1 week ago in [place].  She had sudden onset of recurrent emesis and diarrhea.  She fainted twice with being able to catch herself the first time but the second time she was in the bathroom and fell.  She does not recall the details.  She may have been sitting on the toilet so the fall was not very far.  She has noticed a bruise to her left forearm as well as pain along the left costal margin.  She spent the entire next day in bed and started to feel better about 36 hours after onset of the symptoms.  She has been using ibuprofen for the pain along the left costal margin.  She has no hemoptysis.  Splinting the area helps but otherwise, laughing, sneezing and movement are quite painful.  She does not feel short of breath at rest.    She did ask about her blood pressure.  She has been checking it at home and its been elevated also.  She has not seen a doctor for regular care in a few years.    Review of Systems   Constitutional, HEENT, cardiovascular, pulmonary, gi and gu systems are negative, except as otherwise noted.      Objective    BP (!) 152/84 (BP Location: Right arm, Patient Position: Sitting, Cuff Size: Adult Regular)   Pulse 65   Temp 98.2  F (36.8  C)   Ht 1.676 m (5' 6\")   Wt 84.9 kg (187 lb 3.2 oz)   LMP 11/30/2004   SpO2 100%   BMI 30.21 kg/m    Body mass index is 30.21 kg/m .  Physical Exam   GENERAL: healthy, alert and no distress  EYES: Eyes grossly normal to inspection, PERRL and conjunctivae and sclerae normal  NECK: no adenopathy, no asymmetry, masses, or scars and thyroid normal to palpation  RESP: lungs clear to auscultation - no rales, rhonchi or wheezes  RESP: There is point tenderness to palpation along the left costal margin in the anterior axillary line of probably the 10th and 11th ribs.  No crepitus or step-off " defects is noted.  No bruising to the area is noted.  Bruises noted on the left forearm.  CV: regular rate and rhythm, normal S1 S2, no S3 or S4, no murmur, click or rub, no peripheral edema and peripheral pulses strong  ABDOMEN: soft, nontender, no hepatosplenomegaly, no masses and bowel sounds normal  MS: no gross musculoskeletal defects noted, no edema  SKIN: no suspicious lesions or rashes  NEURO: Normal strength and tone, mentation intact and speech normal  PSYCH: mentation appears normal, affect normal/bright              Roomed by Flakita Quinones, CF

## 2022-04-18 NOTE — TELEPHONE ENCOUNTER
Triage call:     Patient calling after a back injury from passing out after severe vomiting last week.   She states last Monday night she had diarrhea and vomiting that caused her to pass out. She fell and hit her side.   On Wednesday 4/13 she began to have a continuous pain in her left side. She is taking Ibuprofen and rates the pain as moderate.   It is located throughout her ribs between the sternum and umbilicus in the front.     Per protocol, advised patient to be seen in clinic today. Discussed walk in care if no appointments available. She verbalizes understanding and agreement with this plan of care. Transferred to scheduling.    Chica Castano RN   04/18/22 10:19 AM  [facility] Nurse Advisor          Reason for Disposition    Followed a major injury to the back (e.g., MVA, fall > 10 feet or 3 meters, penetrating injury, etc.)    Patient wants to be seen    Additional Information    Negative: Passed out (i.e., lost consciousness, collapsed and was not responding)    Negative: Shock suspected (e.g., cold/pale/clammy skin, too weak to stand, low BP, rapid pulse)    Negative: Sounds like a life-threatening emergency to the triager    Negative: Dangerous mechanism of injury (e.g., MVA, contact sports, trampoline, diving, fall > 10 feet or 3 meters) (Exception: back pain began > 1 hour after injury)    Negative: Weakness (i.e., paralysis, loss of muscle strength) of the leg(s) or foot and sudden onset after back injury    Negative: Numbness (i.e., loss of sensation) of the leg(s) or foot and sudden onset after back injury    Negative: Major bleeding (actively dripping or spurting) that can't be stopped    Negative: Bullet, knife or other serious penetrating wound    Negative: Shock suspected (e.g., cold/pale/clammy skin, too weak to stand, low BP, rapid pulse)    Negative: Sounds like a life-threatening emergency to the triager    Negative: Back pain not from an injury    Negative: Back pain from overuse  (work, exercise, gardening) OR from twisting, lifting, or bending injury    Negative: SEVERE pain in kidney area (flank) that follows a direct blow to that site    Negative: Blood in urine (red, pink, or tea-colored)    Negative: Unable to urinate (or only a few drops) > 4 hours and bladder feels very full (e.g., palpable bladder or strong urge to urinate)    Negative: Loss of bladder or bowel control (urine or bowel incontinence; wetting self, leaking stool) of new onset    Negative: Numbness (loss of sensation) in groin or rectal area    Negative: Skin is split open or gaping (length > 1/2 inch or 12 mm)    Negative: Puncture wound of back    Negative: Bleeding won't stop after 10 minutes of direct pressure (using correct technique)    Negative: Sounds like a serious injury to the triager    Negative: Weakness of a leg or foot (e.g., unable to bear weight, dragging foot)    Negative: Numbness of a leg or foot (i.e.., loss of sensation)    Negative: SEVERE back pain (e.g., excruciating, unable to do any normal activities) and not improved after pain medicine and CARE ADVICE    Negative: Pain radiates into the thigh or further down the leg now    Negative: Landed hard on feet or buttocks and pain over spine    Negative: Patient is confused or is an unreliable provider of information (e.g., dementia, severe intellectual disability, alcohol intoxication)    Negative: HIV positive or severe immunodeficiency (severely weak immune system) and DIRTY cut or scrape    Negative: No prior tetanus shots (or is not fully vaccinated) and any wound (e.g., cut or scrape)    Negative: Large swelling or bruise and size > palm of person's hand    Protocols used: FLANK PAIN-A-OH, BACK INJURY-A-OH

## 2022-04-19 ENCOUNTER — ANCILLARY PROCEDURE (OUTPATIENT)
Dept: GENERAL RADIOLOGY | Facility: CLINIC | Age: 67
End: 2022-04-19
Attending: FAMILY MEDICINE
Payer: COMMERCIAL

## 2022-04-19 DIAGNOSIS — R07.81 RIB PAIN ON LEFT SIDE: ICD-10-CM

## 2022-04-19 PROCEDURE — 71101 X-RAY EXAM UNILAT RIBS/CHEST: CPT | Mod: LT | Performed by: RADIOLOGY

## 2022-04-21 ENCOUNTER — TELEPHONE (OUTPATIENT)
Dept: GASTROENTEROLOGY | Facility: CLINIC | Age: 67
End: 2022-04-21
Payer: COMMERCIAL

## 2022-04-21 NOTE — TELEPHONE ENCOUNTER
Screening Questions  BlueKIND OF PREP RedLOCATION [review exclusion criteria] GreenSEDATION TYPE  1. Are you active on mychart? Y    2. Ordering/Referring Provider: Poncho Ross MD    3. What insurance is in the chart? BCBS MEDICARE     4. Do you have a legal guardian or medical Power of ?  Are you able to give consent for your medical care? Y   (Sedation review/consideration needed)    3.   BMI 30.21 [BMI OVER 40-EXTENDED PREP]  If greater than 40 review exclusion criteria [PAC APPT IF @ [facility]]      4. Have you had a positive covid test in the last 90 days? N     5.  Respiratory Screening :  [If yes to any of the following HOSPITAL setting only]     Do you use daily home oxygen? N  Do you have mod to severe Obstructive Sleep Apnea? N [OKAY @ [facility] SH PH RI]   Do you have Pulmonary Hypertension? N   Do you have UNCONTROLLED asthma? N      6.   Have you had a heart or lung transplant? N      7.   Are you currently on dialysis? N [ If yes, G-PREP & HOSPITAL setting only]     8.   Do you have chronic kidney disease? N[ If yes, G-PREP ]    9.   Have you had a stroke or Transient ischemic attack (TIA - aka  mini stroke ) within 6 months?  N(If yes, please review exclusion criteria)    10.   In the past 6 months, have you had any heart related issues including cardiomyopathy or heart attack? N          If yes, did it require cardiac stenting or other implantable device? N      11.   Do you have any implantable devices in your body (pacemaker, defib, LVAD)? N (If yes, please review exclusion criteria)    12.   Do you take nitroglycerin? N           If yes, how often? N  (if yes, HOSPITAL setting ONLY)    13.   Are you currently taking any blood thinners? N           [IF YES, INFORM PATIENT TO FOLLOW UP W/ ORDERING PROVIDER FOR BRIDGING INSTRUCTIONS]     14.   Do you have a diagnosis of diabetes? N [ If yes, G-PREP ]    15.   [FEMALES] Are you currently pregnant? N    If yes, how many weeks?  N    16.   Are you taking any prescription pain medications on a routine schedule?  N [ If yes, EXTENDED PREP.] [If yes, MAC]    17.   Do you have any chemical dependencies such as alcohol, street drugs, or methadone?  N [If yes, MAC]    18.   Do you have any history of post-traumatic stress syndrome, severe anxiety or history of psychosis?  N [If yes, MAC]    19.   Do you transfer independently?  N    20.  On a regular basis do you go 3-5 days between bowel movements? N [ If yes, EXTENDED PREP.]    21.   Preferred LOCAL Pharmacy for Pre Prescription        [facility] [place] [address]    Additional comments: PATIENT WILL CALL BACK

## 2022-04-23 ENCOUNTER — OFFICE VISIT (OUTPATIENT)
Dept: URGENT CARE | Facility: URGENT CARE | Age: 67
End: 2022-04-23

## 2022-04-23 ENCOUNTER — ANCILLARY PROCEDURE (OUTPATIENT)
Dept: GENERAL RADIOLOGY | Facility: CLINIC | Age: 67
End: 2022-04-23
Attending: PHYSICIAN ASSISTANT
Payer: COMMERCIAL

## 2022-04-23 VITALS
RESPIRATION RATE: 16 BRPM | OXYGEN SATURATION: 97 % | TEMPERATURE: 98.4 F | HEART RATE: 90 BPM | SYSTOLIC BLOOD PRESSURE: 159 MMHG | DIASTOLIC BLOOD PRESSURE: 96 MMHG

## 2022-04-23 DIAGNOSIS — S69.91XA WRIST INJURY, RIGHT, INITIAL ENCOUNTER: ICD-10-CM

## 2022-04-23 DIAGNOSIS — S52.501A CLOSED FRACTURE OF DISTAL END OF RIGHT RADIUS, UNSPECIFIED FRACTURE MORPHOLOGY, INITIAL ENCOUNTER: Primary | ICD-10-CM

## 2022-04-23 PROCEDURE — 99214 OFFICE O/P EST MOD 30 MIN: CPT | Performed by: PHYSICIAN ASSISTANT

## 2022-04-23 PROCEDURE — 73110 X-RAY EXAM OF WRIST: CPT | Mod: RT | Performed by: RADIOLOGY

## 2022-04-24 NOTE — PROGRESS NOTES
(S52.501A) Closed fracture of distal end of right radius, unspecified fracture morphology, initial encounter  (primary encounter diagnosis)  Plan: XR Wrist Right G/E 3 Views, Wrist/Arm/Hand         Supplies Order for DME - ONLY FOR DME,         Orthopedic  Referral    (S69.91XA) Wrist injury, right, initial encounter  Plan: XR Wrist Right G/E 3 Views, Wrist/Arm/Hand         Supplies Order for DME - ONLY FOR DME,         Orthopedic  Referral    Rest the affected area as much as possible.  Apply ice for 15-20 minutes intermittently as needed and especially after any offending activity. Hot packs are better for muscle spasms and cramping. Daily stretching as tolerated.  As pain recedes, begin normal activities slowly as tolerated.  Consider Physical Therapy after 6 weeks if symptoms not better with conservative care.      Okay to take acetaminophen 500 mg- 2 tabs (Total of 1000 mg) every 8 hrs   Okay to take ibuprofen 200 mg- 3 tabs (Total of 600 mg) every 6 hours      30 minutes spent on the date of the encounter doing chart review, history and exam, documentation and further activities per the note    Sebastien Abbott PA-C  [facility]    Subjective   67 year old who presents to clinic today for the following health issues:    Urgent Care and Fall       HPI     Fell around 630 pm today, caught herself on right wrist; having pain 7/10; used ice and wrist brace for carpal tunnel. Denies numbness or tingling. Minimal ROM. No history of wrist injury.     Review of Systems   Review of Systems   See HPI     Objective    Temp: 98.4  F (36.9  C) Temp src: Temporal BP: (!) 159/96 Pulse: 90   Resp: 16 SpO2: 97 %       Physical Exam   Physical Exam  Constitutional:       General: She is not in acute distress.     Appearance: Normal appearance. She is normal weight. She is not ill-appearing, toxic-appearing or diaphoretic.   HENT:      Head: Normocephalic and atraumatic.   Cardiovascular:       Rate and Rhythm: Normal rate.      Pulses: Normal pulses.   Pulmonary:      Effort: Pulmonary effort is normal. No respiratory distress.   Musculoskeletal:      Right wrist: Swelling, tenderness and bony tenderness present. No deformity or snuff box tenderness. Normal range of motion. Normal pulse.      Left wrist: Normal.   Neurological:      General: No focal deficit present.      Mental Status: She is alert and oriented to person, place, and time. Mental status is at baseline.      Gait: Gait normal.   Psychiatric:         Mood and Affect: Mood normal.         Behavior: Behavior normal.         Thought Content: Thought content normal.         Judgment: Judgment normal.          Results for orders placed or performed in visit on 04/23/22 (from the past 24 hour(s))   XR Wrist Right G/E 3 Views    Narrative    EXAM: XR WRIST RIGHT G/E 3 VIEWS  LOCATION: [facility]  DATE/TIME: 4/23/2022 8:12 PM    INDICATION: Wrist injury, right, initial encounter.  COMPARISON: None.      Impression    IMPRESSION: Acute mildly displaced distal radial metaphyseal fracture suspected. Cortical irregularity and offset along the dorsal margin of the distal radial metaphysis. Right wrist soft tissue swelling. No definite distal right ulna fracture. Carpal   bones and metacarpals intact. 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

## 2022-04-25 ENCOUNTER — TELEPHONE (OUTPATIENT)
Dept: ORTHOPEDICS | Facility: CLINIC | Age: 67
End: 2022-04-25
Payer: COMMERCIAL

## 2022-04-25 NOTE — TELEPHONE ENCOUNTER
Orthopedic/Sports Medicine Fracture Triage    Incoming call/or message from referral team member and Call center    Fracture type: Wrist.    The patient is in a  off the shelf brace.    Date of injury 4/23/22.    Triaged by: Dr. Dougherty .    Determined to be managed Surgically.    Needs to be seen within 1 week.    Additional Comments/information:     Bronwyn Valerio LPN

## 2022-04-25 NOTE — TELEPHONE ENCOUNTER
Patient has a referral regarding Closed fracture of distal end of right radius.    Sending this for review to make sure appt is scheduled with appropriate provider.    Please advise. Thank you!

## 2022-04-26 NOTE — TELEPHONE ENCOUNTER
DIAGNOSIS:wrist injury right radius   APPOINTMENT DATE: 4.29.22   NOTES STATUS DETAILS   OFFICE NOTE from referring provider Internal 4.23.22 [facility]   OFFICE NOTE from other specialist     DISCHARGE SUMMARY from hospital     DISCHARGE REPORT from the ER     OPERATIVE REPORT     MEDICATION LIST Internal    EMG (for Spine)     IMPLANT RECORD/STICKER     LABS     CBC/DIFF Internal 12.13.16   CULTURES     INJECTIONS DONE IN RADIOLOGY     MRI     CT SCAN     XRAYS (IMAGES & REPORTS) Internal 4.23.22 R wrist   TUMOR     PATHOLOGY  Slides & report

## 2022-04-29 ENCOUNTER — THERAPY VISIT (OUTPATIENT)
Dept: OCCUPATIONAL THERAPY | Facility: CLINIC | Age: 67
End: 2022-04-29
Payer: COMMERCIAL

## 2022-04-29 ENCOUNTER — PRE VISIT (OUTPATIENT)
Dept: ORTHOPEDICS | Facility: CLINIC | Age: 67
End: 2022-04-29
Payer: COMMERCIAL

## 2022-04-29 ENCOUNTER — OFFICE VISIT (OUTPATIENT)
Dept: ORTHOPEDICS | Facility: CLINIC | Age: 67
End: 2022-04-29
Payer: COMMERCIAL

## 2022-04-29 DIAGNOSIS — S52.501A CLOSED FRACTURE OF DISTAL END OF RIGHT RADIUS, UNSPECIFIED FRACTURE MORPHOLOGY, INITIAL ENCOUNTER: ICD-10-CM

## 2022-04-29 DIAGNOSIS — M25.531 RIGHT WRIST PAIN: Primary | ICD-10-CM

## 2022-04-29 DIAGNOSIS — S69.91XA WRIST INJURY, RIGHT, INITIAL ENCOUNTER: ICD-10-CM

## 2022-04-29 PROCEDURE — 97165 OT EVAL LOW COMPLEX 30 MIN: CPT | Mod: GO | Performed by: OCCUPATIONAL THERAPIST

## 2022-04-29 PROCEDURE — 29075 APPL CST ELBW FNGR SHORT ARM: CPT | Mod: RT | Performed by: PHYSICIAN ASSISTANT

## 2022-04-29 PROCEDURE — 99203 OFFICE O/P NEW LOW 30 MIN: CPT | Mod: 25 | Performed by: PHYSICIAN ASSISTANT

## 2022-04-29 PROCEDURE — 97110 THERAPEUTIC EXERCISES: CPT | Mod: GO | Performed by: OCCUPATIONAL THERAPIST

## 2022-04-29 NOTE — PROGRESS NOTES
Hand Therapy Initial Evaluation    Current Date:  4/29/2022    Diagnosis: Closed right distal radius fracture, stable, with intra-articular extension at the level of the lunate fossa  DOI: fell 4/23/22    Procedure:  velcro wrist brace and cast  Post:  0w 6d    Precautions: NWB, caregiver    Subjective:  Carol Barkley is a 67 year old female.    Imaging: Acute mildly displaced distal radial metaphyseal fracture suspected. Cortical irregularity and offset along the dorsal margin of the distal radial metaphysis.     Patient reports symptoms of the right wrist which occurred due to fall. Since onset symptoms are Gradually getting better.  General health as reported by patient is good.  Pertinent medical history includes: See electronic medical record  Medical allergies:  none.  Surgical history: none pertinent to the UEs; See electronic medical record Medication history: See electronic medical record    Current occupation:The patient has an organist part-time.    She also takes care of her 2-year-old and 3-month-old grandchildren a several days a week.  Job Tasks: Lifting, Carrying, Repetitive Tasks    Occupational Profile Information:  Right hand dominant  Prior functional level:  no limitations  Patient reports symptoms of pain, weakness/loss of strength and edema  Special tests:  x-ray.    Previous treatment: OTC wrist brace  Barriers include:none  Mobility: No difficulty  Transportation: drives  Currently not working due to present treatment problem      Functional Outcome Measure:   TBD    Objective:  Pain Level (Scale 0-10)   4/29/2022   At Rest 5   With Use 5     Pain Description  Date 4/29/2022   Location Dorsal radial wrist   Pain Quality Aching and Tender   Frequency constant     Pain is worst  daytime or nighttime   Exacerbated by  the OTC brace was not comfortable.  Cast seems much better.   Relieved by NSAIDs   Progression  improving     Sensation    WNL throughout all nerve distributions; per patient  "report    Edema:  mild of the  fingers     Shoulder Screen: Good shoulder motion per patient    ROM  Pain Report:  -none + mild    ++ moderate    +++ severe   Elbow 4/29/2022 4/29/2022   AROM (PROM) R L   Extension  WNL  WNL   Flexion  WNL  WNL     Wrist 4/29/2022 4/29/2022   AROM (PROM) R L   Extension  limited by cast  WNL   Flexion \" \"   RD \" \"   UD \" \"   Supination \" \"   Pronation mild limitation \"     ROM of Fingers:  4/29/2022 Finger ROM is mildly stiff, but overall very good, in  all planes      Thumb 4/29/2022 4/29/2022   AROM (PROM) R L   MP / /   IP / /   RABD     PABD     Retropulsion     Kapandji Opposition Scale (0-10/10) 6 10       Assessment:  Patient presents with symptoms consistent with diagnosis of right distal radius fracture.     Patient's limitations or Problem List includes: pain, weakness, decreased ROM  of the right wrist which interferes with the patient's ability to perform ADLs and instrumental activities of daily living as compared to previous level of function.    Rehab Potential:  Excellent, expect return to normal activities.    Patient will benefit from skilled Occupational Therapy to increase ROM and decrease pain, to return to previous activity level and resume normal daily tasks and to reach their rehab potential.    Barriers to Learning:  no barriers    Communication Issues:  Patient appears to be able to clearly communicate and understand verbal and written communication and follow directions correctly.    Chart Review: Brief history including review of medical and/or therapy records relating to the presenting problem and Simple history review with patient    Identified Performance Deficits: work , recreation , self cares and home establishment and management.  Assessment of Occupational Performance:  3-5 Performance Deficits    Clinical Decision Making (Complexity): Low complexity    Treatment Explanation:  The following has been discussed with the patient:  RX ordered/plan of " care  Anticipated outcomes  Possible risks and side effects    Plan:  Frequency:  1 X a month, once daily, during immobilization phase   Duration:  for 1 months increasing to 3 X a month over 2 months (7)    Treatment Plan:   Modalities:  Fluidotherapy and Paraffin  Therapeutic Exercise:  AROM, AAROM, PROM, Tendon Gliding, Isotonics, Isometrics and Stabilization  Neuromuscular re-education:  Coordination/Dexterity and Proprioceptive Training  Manual Techniques:  Myofascial release  Orthotic Fabrication:  Static and Forearm based  Self Care:  Self Care Tasks, Ergonomic Considerations and Work Tasks    Discharge Plan:  Achieve all LTG.  Independent in home treatment program.  Reach maximal therapeutic benefit.    Home Program: See flowsheet    Next visit/ Plan:   See patient after cast removal    Thank you for the opportunity to work with this patient.   If you have questions or concerns, please contact me with an in basket message or via the information below.     Veronica Stacy MS, OTR/L, CHT  Certified Hand Therapist    [facility] - Hand Therapy  Cl[facility]  [address] [place] [address], [address], [place] [address]    Email: [email or web address]   Phone / Voicemail: [phone]   Hand Therapy  phone: [phone] (staffed M-F)  Fax: [phone]

## 2022-04-29 NOTE — PROGRESS NOTES
Hand Surgery History & Physical    REFERRING PHYSICIAN: Sebastien Abbott   PRIMARY CARE PHYSICIAN: Andrew Nair     Chief Complaint:   Consult (Right wrist fracture, fell 4/23/22)      History of Present Illness:     Carol Barkley is a 67 year old right-hand dominant female who presents for evaluation of right distal radius fracture. This occurred on 4/23/22 when she tripped and fell on an outstretched hand. They were seen at [facility] where they were placed in a vencro wrist splint.     They have been taking Tylenol. Pain has been well controlled. Denies numbness or tingling.     The patient has an organist part-time.  She also takes care of her 2-year-old and 3-month-old grandchildren a several days a week.    Past Medical History:     Past Medical History:   Diagnosis Date     Allergic rhinitis due to other allergen     Immunotherapy     CKD (chronic kidney disease) stage 2, GFR 60-89 ml/min      Cough     seen by Dr Freire and ENT, CXR NL     Cystourethrocele      Hearing loss      Lichen sclerosus et atrophicus of the vulva     Jama     Migraine without aura, without mention of intractable migraine without mention of status migrainosus      PAC (premature atrial contraction) 12/2009    Holter     Personal history of colonic polyps      Pure hypercholesterolemia      PVD (posterior vitreous detachment)      Rectocele      Unspecified sinusitis (chronic)      Urine, incontinence, stress female        Past Surgical History:     Past Surgical History:   Procedure Laterality Date     BREAST BIOPSY, RT/LT       COLONOSCOPY  01/2014     COLONOSCOPY  1/8/2014    Procedure: COLONOSCOPY;  colonoscopy  ;  Surgeon: Jose Newton MD;  Location:  GI     HC REMOVAL GALLBLADDER      Cholecystectomy     HYSTERECTOMY VAGINAL  5/17/2011    w/ bilateral salphingo-oophorectomy, eterocele reapir w/ Jamie culdoplasty, anterior colporrhaphy, posterior colpoperineorrhaphy, pubovaginal sling (mini-Arc)  placement, cystoscopy and suprapubic catheter palcement     HYSTERECTOMY, PAP NO LONGER INDICATED       HYSTEROSCOPY  2/2007    D&C     TUBAL LIGATION  1989     ZZC APPENDECTOMY       ZZC NARANJO W/O FACETEC FORAMOT/DSKC 1/2 VRT SEG, LUMBAR      L5-S1     ZZC STRESS ECHO (TREADMILL) FL  4/2006       Social History:     Social History     Tobacco Use     Smoking status: Never Smoker     Smokeless tobacco: Never Used   Substance Use Topics     Alcohol use: No     Comment: 1 drinks per week       Family History:     Family History   Problem Relation Age of Onset     C.A.D. Father      Neurologic Disorder Father         parkinsons     Hypertension Father      Hyperlipidemia Father      Depression Father         Parkinsons related     Heart Disease Mother      Breast Cancer Mother         age 70 dx     Hypertension Sister      Cancer - colorectal Maternal Aunt      Cancer - colorectal Maternal Uncle      Cancer Paternal Uncle         bone     Colon Cancer Other         Maternal Uncle     Colon Cancer Other         Maternal Aunt       Allergies:     Allergies   Allergen Reactions     Dust Mite Extract      Grass        Medications:     Current Outpatient Medications   Medication     clobetasol (TEMOVATE) 0.05 % ointment     fluocinolone (SYNALAR) 0.01 % external solution     omeprazole (PRILOSEC) 20 MG DR capsule     PREMARIN vaginal cream     SUMAtriptan (IMITREX) 100 MG tablet     tretinoin (RETIN-A) 0.025 % cream     No current facility-administered medications for this visit.        Review of Systems:     A 10 point ROS was performed and reviewed. Specific responses to these questions are noted at the end of the document.    Physical Exam:   Physical Exam Adult:  General: Well-nourished, alert, cooperative with exam and in no acute distress  HEENT: Atraumatic, normocephalic, extraocular movements intact, moist mucous membranes, trachea midline  Pulmonary: Unlabored work of breathing, on room air  Cardiovascular: Warm and  well-perfused extremities. 2+ radial pulses  Skin: Warm, intact without rashes to the upper extremities, head or neck   Gait: Normal  Neuro/psych: Oriented to time, place and person. Affect is appropriate    Musculoskeletal: A focused physical examination of the right upper extremity reveals:   Inspection -moderate edema and ecchymosis throughout the wrist and dorsal hand.  Palpation -tender to palpation over the distal radius.  Nontender to palpation throughout the hand and fingers.  Neurovascular- intact light touch sensation and motor to distribution of the median, ulnar and radial nerves. Brisk capillary refill to the distal fingers. 2+ radial pulse.   Range of Motion: Able to flex and extend all fingers and thumbs .  Able to make a complete composite fist.  Muscle strength and tone: 5/5 strength EPL, FPL, APB, first dorsal interosseous.               Imaging:   3 view X-ray of the right wrist wrist was independently interpreted by me. The results were discussed with the patient.  Findings include: Stable alignment of nondisplaced distal radius fracture.  There is intra-articular extension at the level of the lunate fossa.  No significant interval displacement.           Assessment and Plan:   Assessment:  67 year old female with nondisplaced intra-articular distal radius fracture.     Plan: We had a lengthy discussion about the diagnosis, radiographic findings, and possible treatment options.  We discussed that currently her fracture is in good alignment.  Recommended nonoperative treatment with 6 to 8 weeks of immobilization.  Offered Exos brace versus cast immobilization.  Patient is nervous about her 2-year-old grandson and protection of the wrist.  Patient elects to proceed with cast immobilization at this time.  Therefore she is placed in a short arm cast.  Cast cares reviewed.  We will see the patient in 2 weeks for recheck with x-rays in cast.  Referral placed for hand therapy today.  All questions  answered and the patient was in agreement with plan.    Jovanna Tam PA-C   Orthopedic Surgery  4/29/2022 2:18 PM    Answers for HPI/ROS submitted by the patient on 4/28/2022  General Symptoms: No  Skin Symptoms: No  HENT Symptoms: No  EYE SYMPTOMS: No  HEART SYMPTOMS: No  LUNG SYMPTOMS: No  INTESTINAL SYMPTOMS: No  URINARY SYMPTOMS: No  GYNECOLOGIC SYMPTOMS: No  BREAST SYMPTOMS: No  SKELETAL SYMPTOMS: No  BLOOD SYMPTOMS: No  NERVOUS SYSTEM SYMPTOMS: No  MENTAL HEALTH SYMPTOMS: No  Cast/splint application    Date/Time: 4/29/2022 4:17 PM  Performed by: Sharon Saleh ATC  Authorized by: Jovanna Tam PA-C     Consent:     Consent obtained:  Verbal    Consent given by:  Patient  Pre-procedure details:     Sensation:  Normal  Procedure details:     Laterality:  Right    Location:  Wrist    Wrist:  R wrist    Cast type:  Short arm    Supplies:  Fiberglass  Post-procedure details:     Pain:  Unchanged    Patient tolerance of procedure:  Tolerated well, no immediate complications    Patient provided with cast or splint care instructions: Yes

## 2022-04-29 NOTE — LETTER
4/29/2022         RE: Carol Barkley  [address]  [facility] [address]        Dear Colleague,    Thank you for referring your patient, Carol Barkley, to the [facility] [place]. Please see a copy of my visit note below.    Hand Surgery History & Physical    REFERRING PHYSICIAN: Sebastien Abbott   PRIMARY CARE PHYSICIAN: Andrew Nair     Chief Complaint:   Consult (Right wrist fracture, fell 4/23/22)      History of Present Illness:     Carol Barkley is a 67 year old right-hand dominant female who presents for evaluation of right distal radius fracture. This occurred on 4/23/22 when she tripped and fell on an outstretched hand. They were seen at [facility] where they were placed in a vencro wrist splint.     They have been taking Tylenol. Pain has been well controlled. Denies numbness or tingling.     The patient has an organist part-time.  She also takes care of her 2-year-old and 3-month-old grandchildren a several days a week.    Past Medical History:     Past Medical History:   Diagnosis Date     Allergic rhinitis due to other allergen     Immunotherapy     CKD (chronic kidney disease) stage 2, GFR 60-89 ml/min      Cough     seen by Dr Freire and ENT, CXR NL     Cystourethrocele      Hearing loss      Lichen sclerosus et atrophicus of the vulva     Jama     Migraine without aura, without mention of intractable migraine without mention of status migrainosus      PAC (premature atrial contraction) 12/2009    Holter     Personal history of colonic polyps      Pure hypercholesterolemia      PVD (posterior vitreous detachment)      Rectocele      Unspecified sinusitis (chronic)      Urine, incontinence, stress female        Past Surgical History:     Past Surgical History:   Procedure Laterality Date     BREAST BIOPSY, RT/LT       COLONOSCOPY  01/2014     COLONOSCOPY  1/8/2014    Procedure: COLONOSCOPY;  colonoscopy  ;  Surgeon: Jose Newton MD;   Location: [facility]     HC REMOVAL GALLBLADDER      Cholecystectomy     HYSTERECTOMY VAGINAL  5/17/2011    w/ bilateral salphingo-oophorectomy, eterocele reapir w/ Jamie culdoplasty, anterior colporrhaphy, posterior colpoperineorrhaphy, pubovaginal sling (mini-Arc) placement, cystoscopy and suprapubic catheter palcement     HYSTERECTOMY, PAP NO LONGER INDICATED       HYSTEROSCOPY  2/2007    D&C     TUBAL LIGATION  1989     ZZC APPENDECTOMY       ZZC NARANJO W/O FACETEC FORAMOT/DSKC 1/2 VRT SEG, LUMBAR      L5-S1     ZZC STRESS ECHO (TREADMILL) FL  4/2006       Social History:     Social History     Tobacco Use     Smoking status: Never Smoker     Smokeless tobacco: Never Used   Substance Use Topics     Alcohol use: No     Comment: 1 drinks per week       Family History:     Family History   Problem Relation Age of Onset     C.A.D. Father      Neurologic Disorder Father         parkinsons     Hypertension Father      Hyperlipidemia Father      Depression Father         Parkinsons related     Heart Disease Mother      Breast Cancer Mother         age 70 dx     Hypertension Sister      Cancer - colorectal Maternal Aunt      Cancer - colorectal Maternal Uncle      Cancer Paternal Uncle         bone     Colon Cancer Other         Maternal Uncle     Colon Cancer Other         Maternal Aunt       Allergies:     Allergies   Allergen Reactions     Dust Mite Extract      Grass        Medications:     Current Outpatient Medications   Medication     clobetasol (TEMOVATE) 0.05 % ointment     fluocinolone (SYNALAR) 0.01 % external solution     omeprazole (PRILOSEC) 20 MG DR capsule     PREMARIN vaginal cream     SUMAtriptan (IMITREX) 100 MG tablet     tretinoin (RETIN-A) 0.025 % cream     No current facility-administered medications for this visit.        Review of Systems:     A 10 point ROS was performed and reviewed. Specific responses to these questions are noted at the end of the document.    Physical Exam:   Physical Exam  Adult:  General: Well-nourished, alert, cooperative with exam and in no acute distress  HEENT: Atraumatic, normocephalic, extraocular movements intact, moist mucous membranes, trachea midline  Pulmonary: Unlabored work of breathing, on room air  Cardiovascular: Warm and well-perfused extremities. 2+ radial pulses  Skin: Warm, intact without rashes to the upper extremities, head or neck   Gait: Normal  Neuro/psych: Oriented to time, place and person. Affect is appropriate    Musculoskeletal: A focused physical examination of the right upper extremity reveals:   Inspection -moderate edema and ecchymosis throughout the wrist and dorsal hand.  Palpation -tender to palpation over the distal radius.  Nontender to palpation throughout the hand and fingers.  Neurovascular- intact light touch sensation and motor to distribution of the median, ulnar and radial nerves. Brisk capillary refill to the distal fingers. 2+ radial pulse.   Range of Motion: Able to flex and extend all fingers and thumbs .  Able to make a complete composite fist.  Muscle strength and tone: 5/5 strength EPL, FPL, APB, first dorsal interosseous.               Imaging:   3 view X-ray of the right wrist wrist was independently interpreted by me. The results were discussed with the patient.  Findings include: Stable alignment of nondisplaced distal radius fracture.  There is intra-articular extension at the level of the lunate fossa.  No significant interval displacement.           Assessment and Plan:   Assessment:  67 year old female with nondisplaced intra-articular distal radius fracture.     Plan: We had a lengthy discussion about the diagnosis, radiographic findings, and possible treatment options.  We discussed that currently her fracture is in good alignment.  Recommended nonoperative treatment with 6 to 8 weeks of immobilization.  Offered Exos brace versus cast immobilization.  Patient is nervous about her 2-year-old grandson and protection of the  wrist.  Patient elects to proceed with cast immobilization at this time.  Therefore she is placed in a short arm cast.  Cast cares reviewed.  We will see the patient in 2 weeks for recheck with x-rays in cast.  Referral placed for hand therapy today.  All questions answered and the patient was in agreement with plan.    Jovanna Tam PA-C   Orthopedic Surgery  4/29/2022 2:18 PM    Answers for HPI/ROS submitted by the patient on 4/28/2022  General Symptoms: No  Skin Symptoms: No  HENT Symptoms: No  EYE SYMPTOMS: No  HEART SYMPTOMS: No  LUNG SYMPTOMS: No  INTESTINAL SYMPTOMS: No  URINARY SYMPTOMS: No  GYNECOLOGIC SYMPTOMS: No  BREAST SYMPTOMS: No  SKELETAL SYMPTOMS: No  BLOOD SYMPTOMS: No  NERVOUS SYSTEM SYMPTOMS: No  MENTAL HEALTH SYMPTOMS: No  Cast/splint application    Date/Time: 4/29/2022 4:17 PM  Performed by: Sharon Saleh ATC  Authorized by: Jovanna Tam PA-C     Consent:     Consent obtained:  Verbal    Consent given by:  Patient  Pre-procedure details:     Sensation:  Normal  Procedure details:     Laterality:  Right    Location:  Wrist    Wrist:  R wrist    Cast type:  Short arm    Supplies:  Fiberglass  Post-procedure details:     Pain:  Unchanged    Patient tolerance of procedure:  Tolerated well, no immediate complications    Patient provided with cast or splint care instructions: Yes

## 2022-05-01 PROBLEM — M25.531 RIGHT WRIST PAIN: Status: ACTIVE | Noted: 2022-05-01

## 2022-05-02 NOTE — PROGRESS NOTES
[facility]    OUTPATIENT Occupational Therapy ORTHOPEDIC EVALUATION  PLAN OF TREATMENT FOR OUTPATIENT REHABILITATION  (COMPLETE FOR INITIAL CLAIMS ONLY)  Patient's Last Name, First Name, M.I.  YOB: 1955  Carol Barkley    Provider s Name:  [facility]   Medical Record No.  [id number]   Start of Care Date:  04/29/22   Onset Date:   04/23/22   Type:    OT Medical Diagnosis:    Encounter Diagnosis   Name Primary?    Right wrist pain Yes        Treatment Diagnosis:  non op DRFx        Goals:     04/29/22 1500   Goal #1   Goal #1 household chores   Previous Performance Level Independent   Current Functional Task Other - on additional line   Other Household Chores Perform home chores   Current Performance Level Unable with right hand   STG Target Perfomance Other - on additional line   Other Household Chores Perform home chores   STG Target Perform Level Mild difficulty   Due Date 05/29/22   LTG Target Task/Performance Independent   Due Date 06/29/22       Therapy Frequency:  1 X a month, once daily, during immobilization phase  Predicted Duration of Therapy Intervention:  for 1 months increasing to 3 X a month over 2 months (7)    Veronica Stacy OT                 I CERTIFY THE NEED FOR THESE SERVICES FURNISHED UNDER        THIS PLAN OF TREATMENT AND WHILE UNDER MY CARE     (Physician attestation of this document indicates review and certification of the therapy plan).                     Certification Date From:  04/29/22   Certification Date To:  07/29/22    Referring Provider:  No ref. provider found    Initial Assessment        See Epic Evaluation SOC Date: 04/29/22

## 2022-05-11 DIAGNOSIS — S52.501A CLOSED FRACTURE OF DISTAL END OF RIGHT RADIUS, UNSPECIFIED FRACTURE MORPHOLOGY, INITIAL ENCOUNTER: Primary | ICD-10-CM

## 2022-05-12 NOTE — PROGRESS NOTES
"SOAP note objective information for 5/13/2022.    Diagnosis: Closed right distal radius fracture, stable, with intra-articular extension at the level of the lunate fossa  DOI: fell 4/23/22     Procedure:  velcro wrist brace and cast  Post:  2w 6d     Precautions: NWB, caregiver. Will follow up in 2-3 weeks     Subjective:  Carol Barkley is a 67 year old female.     Imaging: Acute mildly displaced distal radial metaphyseal fracture suspected. Cortical irregularity and offset along the dorsal margin of the distal radial metaphysis.      Patient reports symptoms of the right wrist which occurred due to fall. Since onset symptoms are Gradually getting better.  General health as reported by patient is good.  Pertinent medical history includes: See electronic medical record  Medical allergies:  none.  Surgical history: none pertinent to the UEs; See electronic medical record Medication history: See electronic medical record     Current occupation:The patient has an organist part-time.    She also takes care of her 2-year-old and 3-month-old grandchildren a several days a week.  Job Tasks: Lifting, Carrying, Repetitive Tasks     Objective:  Pain Level (Scale 0-10)    4/29/2022   At Rest 5   With Use 5      Pain Description  Date 4/29/2022   Location Dorsal radial wrist   Pain Quality Aching and Tender   Frequency constant     Pain is worst  daytime or nighttime   Exacerbated by  the OTC brace was not comfortable.  Cast seems much better.   Relieved by NSAIDs   Progression  improving      Sensation    WNL throughout all nerve distributions; per patient report     Edema:  mild of the  fingers     Shoulder Screen: Good shoulder motion per patient     ROM  Pain Report:  -none + mild    ++ moderate    +++ severe   Elbow 4/29/2022 4/29/2022   AROM (PROM) R L   Extension  WNL  WNL   Flexion  WNL  WNL      Wrist 4/29/2022 4/29/2022   AROM (PROM) R L   Extension  limited by cast  WNL   Flexion \" \"   RD \" \"   UD \" \"   Supination \" \" " "  Pronation mild limitation \"      ROM of Fingers:  4/29/2022 Finger ROM is mildly stiff, but overall very good, in  all planes  Digit-o-meter 5/13/2022   Index Finger 0   Long Finger 0   Ring Finger 0   Small Finger 0                 Thumb 4/29/2022 4/29/2022   AROM (PROM) R L   MP / /   IP / /   RABD       PABD       Retropulsion       Kapandji Opposition Scale (0-10/10) 6 10      Please refer to the daily flowsheet for treatment today, total treatment time and time spent performing 1:1 timed codes.     Home Program:  Intrinsics Active Range of Motion Hook Fist  EMR Notes  HEP - Sets 1  Reps 10  Sessions per day 4  Notes  Intrinsics Active Range of Motion Table Top Bending  EMR Notes  HEP - Sets 1  Reps 10  Sessions per day 4  Notes pain free, gentle motion  Finger Active Range of Motion Tendon Glides Fist Series  EMR Notes  HEP - Sets  Reps 6-8  Sessions per day 4  Notes  Thumb Active Range of Motion Opposition  EMR Notes  HEP - Sets  Reps 10  Sessions per day 3  Notes  Shoulder Flexion  EMR Notes  HEP - Sets  Reps 10  Sessions per day 3  Notes     Next visit/ Plan:   Progress per Jovanna's orders    "

## 2022-05-13 ENCOUNTER — ANCILLARY PROCEDURE (OUTPATIENT)
Dept: GENERAL RADIOLOGY | Facility: CLINIC | Age: 67
End: 2022-05-13
Attending: PHYSICIAN ASSISTANT
Payer: COMMERCIAL

## 2022-05-13 ENCOUNTER — OFFICE VISIT (OUTPATIENT)
Dept: ORTHOPEDICS | Facility: CLINIC | Age: 67
End: 2022-05-13
Payer: COMMERCIAL

## 2022-05-13 ENCOUNTER — THERAPY VISIT (OUTPATIENT)
Dept: OCCUPATIONAL THERAPY | Facility: CLINIC | Age: 67
End: 2022-05-13
Payer: COMMERCIAL

## 2022-05-13 DIAGNOSIS — S52.501A CLOSED FRACTURE OF DISTAL END OF RIGHT RADIUS, UNSPECIFIED FRACTURE MORPHOLOGY, INITIAL ENCOUNTER: ICD-10-CM

## 2022-05-13 DIAGNOSIS — M25.531 RIGHT WRIST PAIN: Primary | ICD-10-CM

## 2022-05-13 DIAGNOSIS — S52.501D CLOSED FRACTURE OF DISTAL END OF RIGHT RADIUS WITH ROUTINE HEALING, UNSPECIFIED FRACTURE MORPHOLOGY, SUBSEQUENT ENCOUNTER: Primary | ICD-10-CM

## 2022-05-13 PROCEDURE — 97110 THERAPEUTIC EXERCISES: CPT | Mod: GO | Performed by: OCCUPATIONAL THERAPIST

## 2022-05-13 PROCEDURE — 99213 OFFICE O/P EST LOW 20 MIN: CPT | Performed by: PHYSICIAN ASSISTANT

## 2022-05-13 PROCEDURE — 73110 X-RAY EXAM OF WRIST: CPT | Mod: RT | Performed by: RADIOLOGY

## 2022-05-13 NOTE — PROGRESS NOTES
Hand Surgery Follow up Visit    PRIMARY CARE PHYSICIAN: Andrew Nair           Chief Complaint:   RECHECK (Followup right wrist fracture )    History of Present Illness:     Carol Barkley is a 67 year old female who presents for follow up evaluation of right closed distal radius fracture.    Date of injury: 4/23/22  Non-Operative Intervention performed: Short arm casting    Pain has been well controlled, has been taking tylenol and ibuprofen. Denies any numbness or tingling. Reports her pain has improved significantly over the past several days.          Past Medical, Surgical, Social, and Family History:   Reviewed in the chart.          Allergies:     Allergies   Allergen Reactions     Dust Mite Extract      Grass             Medications:     Current Outpatient Medications   Medication     clobetasol (TEMOVATE) 0.05 % ointment     fluocinolone (SYNALAR) 0.01 % external solution     omeprazole (PRILOSEC) 20 MG DR capsule     PREMARIN vaginal cream     SUMAtriptan (IMITREX) 100 MG tablet     tretinoin (RETIN-A) 0.025 % cream     No current facility-administered medications for this visit.             Review of Systems:     A 10 point ROS was performed and reviewed. Specific responses to these questions are noted at the end of the document.         Physical Exam:   Physical Exam Adult:  General: Well-nourished, alert, cooperative with exam and in no acute distress    Musculoskeletal: A focused physical examination of the right wrist reveals:   Inspection- Short arm cast in place and in poor repair. It is loose without any adjacent skin breakdown. Cast removed revealing skin to be clean, dry and intact. Mild ecchymosis over volar wrist.  Palpation- mild tenderness over th distal radius.   Neurovascular- intact light touch sensation and motor to distribution of the median, ulnar and radial nerves. Brisk capillary refill to the distal fingers. 2+ radial pulse.   Range of Motion: Able to fully flex and extend all  fingers and thumb within the confines of cast.   Muscle strength and tone: 5/5 strength EPL, FPL, APB, first dorsal interosseous.               Imaging:   3 view X-ray of the right wirst was independently interpreted by me. The results were discussed with the patient.  Findings include: stable alignment of intraarticular distal radius fracture.            Assessment and Plan:   Assessment:  67 year old female with nondisplaced intra-articular distal radius fracture.     Plan: We discussed that alignment continues to be appropriate for non operative treatment. Cast is in poor repair and was removed. Patient placed in a exos brace to be worn like a cast. They will follow up in 3 weeks for recheck with XRs. Knows to contact us in the interim with any questions or concerns.     HANNAH ACEVEDO PA-C  5/13/2022 7:11 AM   Orthopaedic Surgery

## 2022-05-13 NOTE — NURSING NOTE
Reason For Visit:   Chief Complaint   Patient presents with     RECHECK     Followup right wrist fracture        Primary MD: Andrew Nair      Age: 67 year old    ?  No      LMP 11/30/2004       Pain Assessment  Patient Currently in Pain: Yes  0-10 Pain Scale: 4  Primary Pain Location: Wrist (right)            QuickDASH Assessment  QuickDA Main 4/28/2022   1. Open a tight or new jar. Unable   2. Do heavy household chores (e.g., wash walls, floors) Severe difficulty   3. Carry a shopping bag or briefcase. Moderate difficulty   4. Wash your back. Severe difficulty   5. Use a knife to cut food. Severe difficulty   6. Recreational activities in which you take some force or impact through your arm, shoulder or hand (e.g., golf, hammering, tennis, etc.). Unable   7. During the past week, to what extent has your arm, shoulder or hand problem interfered with your normal social activities with family, friends, neighbours or groups? Moderately   8. During the past week, were you limited in your work or other regular daily activities as a result of your arm, shoulder or hand problem? Unable   9. Arm, shoulder or hand pain. Severe   10.Tingling (pins and needles) in your arm,shoulder or hand. None   11. During the past week, how much difficulty have you had sleeping because of the pain in your arm, shoulder or hand? Moderate difficulty   Quickdash Ability Score 68.18          Current Outpatient Medications   Medication Sig Dispense Refill     clobetasol (TEMOVATE) 0.05 % ointment        fluocinolone (SYNALAR) 0.01 % external solution        omeprazole (PRILOSEC) 20 MG DR capsule Take 1 capsule (20 mg) by mouth 2 times daily 30 capsule 0     PREMARIN vaginal cream        SUMAtriptan (IMITREX) 100 MG tablet TAKE ONE TABLET BY MOUTH AT ONSET OF HEADACHE FOR MIGRAINE, MAY REPEAT DOSE AFTER 2 HOURS IF NEEDED. DO NOT TAKE MORE THAN 200MG IN 24 HOURS. 9 tablet 0     tretinoin (RETIN-A) 0.025 % cream          Allergies    Allergen Reactions     Dust Mite Extract      Grass        Temple Deanna, ATC

## 2022-05-13 NOTE — LETTER
5/13/2022         RE: Carol Barkley  [address]  [place] [address]        Dear Colleague,    Thank you for referring your patient, Carol Barkley, to the [facility] [place]. Please see a copy of my visit note below.    Hand Surgery Follow up Visit    PRIMARY CARE PHYSICIAN: Andrew Nair           Chief Complaint:   RECHECK (Followup right wrist fracture )    History of Present Illness:     Carol Barkley is a 67 year old female who presents for follow up evaluation of right closed distal radius fracture.    Date of injury: 4/23/22  Non-Operative Intervention performed: Short arm casting    Pain has been well controlled, has been taking tylenol and ibuprofen. Denies any numbness or tingling. Reports her pain has improved significantly over the past several days.          Past Medical, Surgical, Social, and Family History:   Reviewed in the chart.          Allergies:     Allergies   Allergen Reactions     Dust Mite Extract      Grass             Medications:     Current Outpatient Medications   Medication     clobetasol (TEMOVATE) 0.05 % ointment     fluocinolone (SYNALAR) 0.01 % external solution     omeprazole (PRILOSEC) 20 MG DR capsule     PREMARIN vaginal cream     SUMAtriptan (IMITREX) 100 MG tablet     tretinoin (RETIN-A) 0.025 % cream     No current facility-administered medications for this visit.             Review of Systems:     A 10 point ROS was performed and reviewed. Specific responses to these questions are noted at the end of the document.         Physical Exam:   Physical Exam Adult:  General: Well-nourished, alert, cooperative with exam and in no acute distress    Musculoskeletal: A focused physical examination of the right wrist reveals:   Inspection- Short arm cast in place and in poor repair. It is loose without any adjacent skin breakdown. Cast removed revealing skin to be clean, dry and intact. Mild ecchymosis over volar wrist.  Palpation- mild  tenderness over th distal radius.   Neurovascular- intact light touch sensation and motor to distribution of the median, ulnar and radial nerves. Brisk capillary refill to the distal fingers. 2+ radial pulse.   Range of Motion: Able to fully flex and extend all fingers and thumb within the confines of cast.   Muscle strength and tone: 5/5 strength EPL, FPL, APB, first dorsal interosseous.               Imaging:   3 view X-ray of the right wirst was independently interpreted by me. The results were discussed with the patient.  Findings include: stable alignment of intraarticular distal radius fracture.            Assessment and Plan:   Assessment:  67 year old female with nondisplaced intra-articular distal radius fracture.     Plan: We discussed that alignment continues to be appropriate for non operative treatment. Cast is in poor repair and was removed. Patient placed in a exos brace to be worn like a cast. They will follow up in 3 weeks for recheck with XRs. Knows to contact us in the interim with any questions or concerns.     HANNAH ACEVEDO PA-C  5/13/2022 7:11 AM   Orthopaedic Surgery

## 2022-06-02 ENCOUNTER — TELEPHONE (OUTPATIENT)
Dept: ORTHOPEDICS | Facility: CLINIC | Age: 67
End: 2022-06-02
Payer: COMMERCIAL

## 2022-06-02 NOTE — TELEPHONE ENCOUNTER
[facility] Call Center    Phone Message    May a detailed message be left on voicemail: yes     Reason for Call: Other: Pt calling regarding appt that was canceled for 6/3, she is wondering if she can get that appt back on the schedule for a virtual. Please call patient regarding this      Action Taken: Other: [facility]    Travel Screening: Not Applicable

## 2022-06-03 ENCOUNTER — THERAPY VISIT (OUTPATIENT)
Dept: OCCUPATIONAL THERAPY | Facility: CLINIC | Age: 67
End: 2022-06-03
Payer: COMMERCIAL

## 2022-06-03 ENCOUNTER — ANCILLARY PROCEDURE (OUTPATIENT)
Dept: GENERAL RADIOLOGY | Facility: CLINIC | Age: 67
End: 2022-06-03
Attending: PHYSICIAN ASSISTANT
Payer: COMMERCIAL

## 2022-06-03 ENCOUNTER — VIRTUAL VISIT (OUTPATIENT)
Dept: ORTHOPEDICS | Facility: CLINIC | Age: 67
End: 2022-06-03
Payer: COMMERCIAL

## 2022-06-03 DIAGNOSIS — S52.501D CLOSED FRACTURE OF DISTAL END OF RIGHT RADIUS WITH ROUTINE HEALING, UNSPECIFIED FRACTURE MORPHOLOGY, SUBSEQUENT ENCOUNTER: Primary | ICD-10-CM

## 2022-06-03 DIAGNOSIS — M25.531 RIGHT WRIST PAIN: Primary | ICD-10-CM

## 2022-06-03 DIAGNOSIS — S52.501D CLOSED FRACTURE OF DISTAL END OF RIGHT RADIUS WITH ROUTINE HEALING, UNSPECIFIED FRACTURE MORPHOLOGY, SUBSEQUENT ENCOUNTER: ICD-10-CM

## 2022-06-03 PROCEDURE — 73110 X-RAY EXAM OF WRIST: CPT | Mod: RT | Performed by: RADIOLOGY

## 2022-06-03 PROCEDURE — 99212 OFFICE O/P EST SF 10 MIN: CPT | Mod: TEL | Performed by: PHYSICIAN ASSISTANT

## 2022-06-03 PROCEDURE — 97110 THERAPEUTIC EXERCISES: CPT | Mod: GO | Performed by: OCCUPATIONAL THERAPIST

## 2022-06-03 NOTE — TELEPHONE ENCOUNTER
Provider Jovanna HERNANDEZ did not have clinic today, but was working with Jadyn SCOTT  re: getting a special virtual appointment for this patient on 6-3.  Aundrea Elaine RN

## 2022-06-03 NOTE — LETTER
"    6/3/2022         RE: Carol Barkley  [address]  [place] [address]        Dear Colleague,    Thank you for referring your patient, Carol Barkley, to the [facility] [place]. Please see a copy of my visit note below.    [facility]  Orthopedics  Jovanna Tam PA-C   Edith 3, 2022      Name: Carol Barkley   MRN: [id number]   Age: 67 year old   : 1955     Carol Barkley is a 67 year old female who is being evaluated via a billable telephone visit.      The patient has been notified of following:    \"This telephone visit will be conducted via a call between you and your physician/provider. We have found that certain health care needs can be provided without the need for a physical exam.  This service lets us provide the care you need with a short phone conversation.  If a prescription is necessary we can send it directly to your pharmacy.  If lab work is needed we can place an order for that and you can then stop by our lab to have the test done at a later time. If during the course of the call the physician/provider feels a telephone visit is not appropriate, you will not be charged for this service.\"     Physician has received verbal consent for a Telephone Visit from the patient?  YES           Chief Complaint:   RECHECK (Followup right wrist fracture )     History of Present Illness:      Carol Barkley is a 67 year old female who presents for follow up evaluation of right closed distal radius fracture.     Date of injury: 22  Non-Operative Intervention performed: Short arm casting     Pain has been well controlled, has been taking tylenol 1-2 times per day. Denies any numbness or tingling.     Objective:      I have reviewed and updated the patient's Past Medical History, Social History, Family History and Medication List.    ALLERGIES  Dust mite extract and Grass         Imaging:   3 view X-ray of the right wirst was independently interpreted by me. The results " were discussed with the patient.  Findings include: stable alignment of healing intraarticular distal radius fracture with peristent fracture line visualization today.              Assessment and Plan:   Assessment:  67 year old female with nondisplaced intra-articular distal radius fracture.      Plan: We discussed that alignment continues to be appropriate for non operative treatment. Will plan to continue Exos at all times for the next 3 weeks. Anticipate starting gentle wrist ROM in 3 weeks. Continue with NWB to the RUE. Continue with gentle finger ROM. Knows to contact us in the interim with any questions or concerns.     Phone call duration: 5 minutes    Jovanna Tam PA-C

## 2022-06-03 NOTE — PROGRESS NOTES
"[facility]  Orthopedics  Jovanna Tam PA-C   Edith 3, 2022      Name: Carol Barkley   MRN: [id number]   Age: 67 year old   : 1955     Carol Barkley is a 67 year old female who is being evaluated via a billable telephone visit.      The patient has been notified of following:    \"This telephone visit will be conducted via a call between you and your physician/provider. We have found that certain health care needs can be provided without the need for a physical exam.  This service lets us provide the care you need with a short phone conversation.  If a prescription is necessary we can send it directly to your pharmacy.  If lab work is needed we can place an order for that and you can then stop by our lab to have the test done at a later time. If during the course of the call the physician/provider feels a telephone visit is not appropriate, you will not be charged for this service.\"     Physician has received verbal consent for a Telephone Visit from the patient?  YES           Chief Complaint:   RECHECK (Followup right wrist fracture )     History of Present Illness:      Carol Barkley is a 67 year old female who presents for follow up evaluation of right closed distal radius fracture.     Date of injury: 22  Non-Operative Intervention performed: Short arm casting     Pain has been well controlled, has been taking tylenol 1-2 times per day. Denies any numbness or tingling.     Objective:      I have reviewed and updated the patient's Past Medical History, Social History, Family History and Medication List.    ALLERGIES  Dust mite extract and Grass         Imaging:   3 view X-ray of the right wirst was independently interpreted by me. The results were discussed with the patient.  Findings include: stable alignment of healing intraarticular distal radius fracture with peristent fracture line visualization today.              Assessment and Plan:   Assessment:  67 year old female with nondisplaced " intra-articular distal radius fracture.      Plan: We discussed that alignment continues to be appropriate for non operative treatment. Will plan to continue Exos at all times for the next 3 weeks. Anticipate starting gentle wrist ROM in 3 weeks. Continue with NWB to the RUE. Continue with gentle finger ROM. Knows to contact us in the interim with any questions or concerns.     Phone call duration: 5 minutes    Jovanna Tam PA-C

## 2022-06-03 NOTE — PROGRESS NOTES
"Hand Therapy Progress Note    Current Date:  6/3/2022    Diagnosis: Closed right distal radius fracture, stable, with intra-articular extension at the level of the lunate fossa  DOI: fell 4/23/22     Procedure:  velcro wrist brace and cast  Post:  5w 6d     Precautions: NWB, caregiver. Will follow up with Jovanna in 3 weeks. No wrist ROM in the meantime    Reporting period is 5/13/2022 to 6/3/2022    Subjective:   Subjective changes noted by patient:  \"It's hard to care for [the children with the brace on].\"  Functional changes noted by patient:  No Change to Self Care Tasks (dressing, eating, bathing, hygiene/toileting), Recreational Activities and Household Chores  Patient has noted adverse reaction to:  None     Subjective:  Carol Barkley is a 67 year old female.     Imaging: Acute mildly displaced distal radial metaphyseal fracture suspected. Cortical irregularity and offset along the dorsal margin of the distal radial metaphysis.      Patient reports symptoms of the right wrist which occurred due to fall. Since onset symptoms are Gradually getting better.  General health as reported by patient is good.  Pertinent medical history includes: See electronic medical record  Medical allergies:  none.  Surgical history: none pertinent to the UEs; See electronic medical record Medication history: See electronic medical record     Current occupation:The patient has an organist part-time.    She also takes care of her 2-year-old and 3-month-old grandchildren a several days a week.  Job Tasks: Lifting, Carrying, Repetitive Tasks    Functional Outcome Measure:  Upper Extremity Functional Index Score:  SCORE:   Column Totals: /80: 32   (A lower score indicates greater disability.)     Objective:  Pain Level (Scale 0-10)    4/29/2022 6/3/22   At Rest 5 0/10   With Use 5 6/10      Pain Description  Date 4/29/2022   Location Dorsal radial wrist   Pain Quality Aching and Tender   Frequency constant     Pain is worst  daytime or " "nighttime   Exacerbated by  the OTC brace was not comfortable.  Cast seems much better.   Relieved by NSAIDs   Progression  improving      Sensation    WNL throughout all nerve distributions; per patient report     Edema:  mild of the  fingers     Shoulder Screen: Good shoulder motion per patient     ROM  Pain Report:  -none + mild    ++ moderate    +++ severe   Elbow 4/29/2022 4/29/2022 6/3/22   AROM (PROM) R L R   Extension  WNL  WNL WNL   Flexion  WNL  WNL WNL      Wrist 4/29/2022 4/29/2022   AROM (PROM) R L   Extension  limited by cast  WNL   Flexion \" \"   RD \" \"   UD \" \"   Supination \" \"   Pronation mild limitation \"      ROM of Fingers:  4/29/2022 Finger ROM is mildly stiff, but overall very good, in  all planes  Digit-o-meter 5/13/2022   Index Finger 0   Long Finger 0   Ring Finger 0   Small Finger 0           6/3/2022: Improvement in digit flexion/extension within exos brace. Reviewed finger ROM exercises within exos brace.     Thumb 4/29/2022 4/29/2022   AROM (PROM) R L   MP / /   IP / /   RABD       PABD       Retropulsion       Kapandji Opposition Scale (0-10/10) 6 10      Please refer to the daily flowsheet for treatment today, total treatment time and time spent performing 1:1 timed codes.    Assessment:  Response to therapy has been lack of progress in:  Self Care Skills:  No change d/t extended precautions    Overall Assessment:  Patient's treatment restrictions have been changed.  Patient would benefit from continued therapy to achieve rehab potential  STG/LTG:  STGoals have been reviewed and no progress has been made;  see goal sheet for details and changes.    Plan:  Frequency/Duration:  Recommend continuing with the current treatment plan. 1 X a month, once daily  for 1 month increasing to 3 X a month over 2 months  Appropriateness of Rx I have re-evaluated this patient and find that the nature, scope, duration and intensity of the therapy is appropriate for the medical condition of the " patient.  Recommendations for Continued Therapy - continue HEP till precautions change    Treatment Plan:  Modalities:  Fluidotherapy and Paraffin  Therapeutic Exercise:  AROM, AAROM, PROM, Tendon Gliding, Isotonics, Isometrics and Stabilization  Neuromuscular re-education:  Coordination/Dexterity and Proprioceptive Training  Manual Techniques:  Myofascial release  Orthotic Fabrication:  Static and Forearm based  Self Care:  Self Care Tasks, Ergonomic Considerations and Work Tasks    Home Program:  Intrinsics Active Range of Motion Hook Fist  EMR Notes  HEP - Sets 1  Reps 10  Sessions per day 4  Notes  Intrinsics Active Range of Motion Table Top Bending  EMR Notes  HEP - Sets 1  Reps 10  Sessions per day 4  Notes pain free, gentle motion  Finger Active Range of Motion Tendon Glides Fist Series  EMR Notes  HEP - Sets  Reps 6-8  Sessions per day 4  Notes  Thumb Active Range of Motion Opposition  EMR Notes  HEP - Sets  Reps 10  Sessions per day 3  Notes  Shoulder Flexion  EMR Notes  HEP - Sets  Reps 10  Sessions per day 3  Notes     Next visit/ Plan:   Progress per Jovanna's orders

## 2022-06-03 NOTE — PROGRESS NOTES
[facility]    OUTPATIENT Occupational Therapy ORTHOPEDIC EVALUATION  PLAN OF TREATMENT FOR OUTPATIENT REHABILITATION  (COMPLETE FOR INITIAL CLAIMS ONLY)  Patient's Last Name, First Name, M.I.  YOB: 1955  Carol Barkley    Provider s Name:  [facility]   Medical Record No.  [id number]   Start of Care Date:  06/03/22   Onset Date:   04/23/22   Type:     ___PT   _X__OT Medical Diagnosis:    Encounter Diagnosis   Name Primary?    Right wrist pain Yes        Treatment Diagnosis:  non op DRFx        Goals:     06/03/22 0500   Goal #1   Goal #1 household chores   Previous Performance Level Independent   Current Functional Task Other - on additional line   Other Household Chores Perform home chores   Current Performance Level Unable with right hand   STG Target Perfomance Other - on additional line   Other Household Chores Perform home chores   STG Target Perform Level Mild difficulty   Due Date 07/03/22   If goal not met, Why? no change d/t extended precautions for no wrist motion - extended due dates   LTG Target Task/Performance Independent   Due Date 08/03/22   If goal not met, Why? no change d/t extended precautions for no wrist motion - extended due dates       Therapy Frequency:  1 X a month, once daily, during immobilization phase  Predicted Duration of Therapy Intervention:  for 1 months increasing to 3 X a month over 2 months (7)    Shakira Bernal OT                 I CERTIFY THE NEED FOR THESE SERVICES FURNISHED UNDER        THIS PLAN OF TREATMENT AND WHILE UNDER MY CARE     (Physician attestation of this document indicates review and certification of the therapy plan).                     Certification Date From:  06/03/22   Certification Date To:  08/03/22    Referring Provider:  Jovanna Tam    Initial Assessment        See Epic Evaluation SOC Date:  06/03/22

## 2022-06-15 ENCOUNTER — OFFICE VISIT (OUTPATIENT)
Dept: URGENT CARE | Facility: URGENT CARE | Age: 67
End: 2022-06-15
Payer: COMMERCIAL

## 2022-06-15 VITALS
HEIGHT: 66 IN | WEIGHT: 184 LBS | BODY MASS INDEX: 29.57 KG/M2 | TEMPERATURE: 98.2 F | SYSTOLIC BLOOD PRESSURE: 147 MMHG | OXYGEN SATURATION: 98 % | DIASTOLIC BLOOD PRESSURE: 87 MMHG | RESPIRATION RATE: 16 BRPM | HEART RATE: 67 BPM

## 2022-06-15 DIAGNOSIS — A69.20 ERYTHEMA MIGRANS (LYME DISEASE): Primary | ICD-10-CM

## 2022-06-15 PROCEDURE — 99213 OFFICE O/P EST LOW 20 MIN: CPT | Performed by: INTERNAL MEDICINE

## 2022-06-15 RX ORDER — DOXYCYCLINE 100 MG/1
100 CAPSULE ORAL 2 TIMES DAILY
Qty: 28 CAPSULE | Refills: 0 | Status: SHIPPED | OUTPATIENT
Start: 2022-06-15 | End: 2022-06-29

## 2022-06-15 NOTE — PATIENT INSTRUCTIONS
Doxycycline 100 mg 2 x day for 14 days  Hold retin A    Watch for development lyme's   Recheck with persistant symptoms

## 2022-06-15 NOTE — PROGRESS NOTES
"ASSESSMENT AND PLAN:      ICD-10-CM    1. Erythema migrans (Lyme disease)  A69.20 doxycycline hyclate (VIBRAMYCIN) 100 MG capsule     Rash consistent with target   PLAN:      Patient Instructions   Doxycycline 100 mg 2 x day for 14 days  Hold retin A    Watch for development lyme's   Recheck with persistant symptoms           No follow-ups on file.        Rosetta Vernon MD  [facility]    Subjective     Carol Barkley is a 67 year old who presents for Patient presents with:  Urgent Care: Since Friday, jordane under Rt armpit. C/o tick bite lyme's disease.       an established patient of [facility].        Noticed rash 6 days ago  Location: left upper arm  Context: deer lives in neighbors yards  Lives near [facility]  Treatment measures tried include: acetaminophen for arm fracture   Relief from treatment: none    Has body & joint aches, feels this may not be new  On tylenol so doesn't know if has had fever.      Objective    BP (!) 147/87   Pulse 67   Temp 98.2  F (36.8  C) (Temporal)   Resp 16   Ht 1.676 m (5' 6\")   Wt 83.5 kg (184 lb)   LMP 11/30/2004   SpO2 98%   BMI 29.70 kg/m    Physical Exam  Vitals reviewed.   Constitutional:       Appearance: Normal appearance.   Skin:     Comments: right upper arm  Large red rash noted with target like lesion  Measure 6 cm   Neurological:      Mental Status: She is alert.                        "

## 2022-06-17 DIAGNOSIS — S52.501D CLOSED FRACTURE OF DISTAL END OF RIGHT RADIUS WITH ROUTINE HEALING, UNSPECIFIED FRACTURE MORPHOLOGY, SUBSEQUENT ENCOUNTER: Primary | ICD-10-CM

## 2022-06-24 ENCOUNTER — OFFICE VISIT (OUTPATIENT)
Dept: ORTHOPEDICS | Facility: CLINIC | Age: 67
End: 2022-06-24
Payer: COMMERCIAL

## 2022-06-24 ENCOUNTER — THERAPY VISIT (OUTPATIENT)
Dept: OCCUPATIONAL THERAPY | Facility: CLINIC | Age: 67
End: 2022-06-24

## 2022-06-24 ENCOUNTER — ANCILLARY PROCEDURE (OUTPATIENT)
Dept: GENERAL RADIOLOGY | Facility: CLINIC | Age: 67
End: 2022-06-24
Attending: PHYSICIAN ASSISTANT
Payer: COMMERCIAL

## 2022-06-24 DIAGNOSIS — M25.531 RIGHT WRIST PAIN: Primary | ICD-10-CM

## 2022-06-24 DIAGNOSIS — S52.501D CLOSED FRACTURE OF DISTAL END OF RIGHT RADIUS WITH ROUTINE HEALING, UNSPECIFIED FRACTURE MORPHOLOGY, SUBSEQUENT ENCOUNTER: Primary | ICD-10-CM

## 2022-06-24 DIAGNOSIS — S52.501D CLOSED FRACTURE OF DISTAL END OF RIGHT RADIUS WITH ROUTINE HEALING, UNSPECIFIED FRACTURE MORPHOLOGY, SUBSEQUENT ENCOUNTER: ICD-10-CM

## 2022-06-24 PROCEDURE — 73110 X-RAY EXAM OF WRIST: CPT | Mod: RT | Performed by: RADIOLOGY

## 2022-06-24 PROCEDURE — 99213 OFFICE O/P EST LOW 20 MIN: CPT | Performed by: PHYSICIAN ASSISTANT

## 2022-06-24 PROCEDURE — 97760 ORTHOTIC MGMT&TRAING 1ST ENC: CPT | Mod: GO | Performed by: OCCUPATIONAL THERAPIST

## 2022-06-24 PROCEDURE — 97110 THERAPEUTIC EXERCISES: CPT | Mod: GO | Performed by: OCCUPATIONAL THERAPIST

## 2022-06-24 NOTE — PROGRESS NOTES
Hand Surgery Follow up Visit    PRIMARY CARE PHYSICIAN: Andrew Nair           Chief Complaint:   RECHECK (Rt distal radius fracture DOI 4/23/22)    History of Present Illness:     Carol Barkley is a 67 year old female who presents for follow up evaluation of right closed distal radius fracture.    Date of injury: 4/23/22  Non-Operative Intervention performed: Short arm casting    Pain has been well controlled. Denies any numbness or tingling.          Past Medical, Surgical, Social, and Family History:   Reviewed in the chart.          Allergies:     Allergies   Allergen Reactions     Dust Mite Extract      Grass             Medications:     Current Outpatient Medications   Medication     clobetasol (TEMOVATE) 0.05 % ointment     doxycycline hyclate (VIBRAMYCIN) 100 MG capsule     fluocinolone (SYNALAR) 0.01 % external solution     omeprazole (PRILOSEC) 20 MG DR capsule     PREMARIN vaginal cream     SUMAtriptan (IMITREX) 100 MG tablet     tretinoin (RETIN-A) 0.025 % cream     No current facility-administered medications for this visit.             Review of Systems:     A 10 point ROS was performed and reviewed. Specific responses to these questions are noted at the end of the document.         Physical Exam:   Physical Exam Adult:  General: Well-nourished, alert, cooperative with exam and in no acute distress    Musculoskeletal: A focused physical examination of the right wrist reveals:   Inspection- Skin is clean and dry. Mild edema. No ecchymosis.   Palpation- MInimal tenderness over the distal radius, mild tenderness over radial styloid and first dorsal compartment.   Neurovascular- intact light touch sensation and motor to distribution of the median, ulnar and radial nerves. Brisk capillary refill to the distal fingers. 2+ radial pulse.   Range of Motion: Able to fully flex and extend all fingers and thumb within the confines of cast.   Muscle strength and tone: 5/5 strength FPL, APB, first dorsal  interosseous.  Very weakly fires EPL, limited by pain. Slight Extensor lag.            Imaging:   3 view X-ray of the right wirst was independently interpreted by me. The results were discussed with the patient.  Findings include: stable alignment of intraarticular distal radius fracture. Slight prominence of callus dorsally.            Assessment and Plan:   Assessment:  67 year old female with nondisplaced intra-articular distal radius fracture. Suspect possible deQuervain tenosynovitis. Question EPL tenosynovitis vs EPL injury from dorsal bone spurring.      Plan: Plan to initiate therapies today.  May start active range of motion.  She should continue to be nonweightbearing, lifting nothing more than a coffee mug.  We discussed at length her radial sided wrist pain is likely due to some mild de Quervain's tenosynovitis from the cast.  She does have a slight extensor lag to the thumb, question possible EPL tenosynovitis versus EPL injury from dorsal bone spur.  Discussed at length with Shakira of hand therapy.  We will continue to monitor and see if a splint change and some tendon gliding will improve EPL firing.  Shakira with the patient will contact me if there is any concern for EPL tendon rupture.  If that is the case we will order a ultrasound to evaluate tendon continuity.  We will likely discontinue Exos brace today and have hand therapy fabricate a forearm-based wrist orthosis as the access is difficult to get on and off for the patient for regular therapy and hygiene.  Patient will follow-up in 2 weeks for recheck.  Knows to contact us in the interim with any questions or concerns.     HANNAH ACEVEDO PA-C  6/24/2022 12:13 PM   Orthopaedic Surgery

## 2022-06-24 NOTE — PROGRESS NOTES
"Hand Therapy Progress Note    Current Date:  6/24/2022    Diagnosis: Closed right distal radius fracture, stable, with intra-articular extension at the level of the lunate fossa  DOI: fell 4/23/22     Procedure:  velcro wrist brace and cast  Post:  8w 6d     Precautions: AROM, custom orthosis    Reporting period is 6/3/2022 to 6/24/2022    Subjective:  Carol Barkley is a 67 year old female.     Imaging: Acute mildly displaced distal radial metaphyseal fracture suspected. Cortical irregularity and offset along the dorsal margin of the distal radial metaphysis.      Patient reports symptoms of the right wrist which occurred due to fall. Since onset symptoms are Gradually getting better.  General health as reported by patient is good.  Pertinent medical history includes: See electronic medical record  Medical allergies:  none.  Surgical history: none pertinent to the UEs; See electronic medical record Medication history: See electronic medical record     Current occupation:The patient has an organist part-time.    She also takes care of her 2-year-old and 3-month-old grandchildren a several days a week.  Job Tasks: Lifting, Carrying, Repetitive Tasks    Subjective:   Subjective changes noted by patient:  \"Depends on how you move it.\"  Functional changes noted by patient:  No Change to Household Chores  Patient has noted adverse reaction to:  None    Upper Extremity Functional Index Score:  SCORE:   Column Totals: /80: 22   (A lower score indicates greater disability.)     Objective:  Pain Level (Scale 0-10)    4/29/2022 6/3/22   At Rest 5 0/10   With Use 5 6/10      Pain Description  Date 4/29/2022   Location Dorsal radial wrist   Pain Quality Aching and Tender   Frequency constant     Pain is worst  daytime or nighttime   Exacerbated by  the OTC brace was not comfortable.  Cast seems much better.   Relieved by NSAIDs   Progression  improving      Sensation    WNL throughout all nerve distributions; per patient " "report     Edema:  mild of the  fingers     Shoulder Screen: Good shoulder motion per patient     ROM  Pain Report:  -none + mild    ++ moderate    +++ severe   Elbow 4/29/2022 4/29/2022 6/3/22   AROM (PROM) R L R   Extension  WNL  WNL WNL   Flexion  WNL  WNL WNL      Wrist 4/29/2022 4/29/2022 6/24/22   AROM (PROM) R L R   Extension  limited by cast  WNL 42   Flexion \" \" 14   RD \" \" 8   UD \" \" 16   Supination \" \" 87   Pronation mild limitation \" 67      ROM of Fingers:  4/29/2022 Finger ROM is mildly stiff, but overall very good, in  all planes  Digit-o-meter 5/13/2022   Index Finger 0   Long Finger 0   Ring Finger 0   Small Finger 0           6/3/2022: Improvement in digit flexion/extension within exos brace. Reviewed finger ROM exercises within exos brace.     Thumb 4/29/2022 4/29/2022 6/24/22   AROM (PROM) R L L   MP / / -23   IP / / -26   RABD     48   PABD     49   Retropulsion        Kapandji Opposition Scale (0-10/10) 6 10       Please refer to the daily flowsheet for treatment today, total treatment time and time spent performing 1:1 timed codes.    Assessment:  Response to therapy has been improvement to:  ROM of Wrist:  All Planes    Overall Assessment:  Patient is ready to progress to more complex exercises.  Patient would benefit from continued therapy to achieve rehab potential  STG/LTG:  STGoals have been reviewed and no progress has been made;  see goal sheet for details and changes.    Plan:  Frequency/Duration:  Recommend continuing with the current treatment plan. 3 X a month, once daily  for 2 months  Appropriateness of Rx I have re-evaluated this patient and find that the nature, scope, duration and intensity of the therapy is appropriate for the medical condition of the patient.  Recommendations for Continued Therapy  Additions to Treatment Plan -  Therapeutic Exercise:  AROM, AAROM and PROM  Orthotic Fabrication:  Static and Forearm based    Treatment Plan:  Modalities:  Fluidotherapy and " Paraffin  Therapeutic Exercise:  AROM, AAROM, PROM, Tendon Gliding, Isotonics, Isometrics and Stabilization  Neuromuscular re-education:  Coordination/Dexterity and Proprioceptive Training  Manual Techniques:  Myofascial release  Orthotic Fabrication:  Static and Forearm based  Self Care:  Self Care Tasks, Ergonomic Considerations and Work Tasks    Home Program:  Orthosis Wear and Care  EMR Notes  HEP - Sets  Reps  Sessions per day  Notes  Wrist Active Range of Motion Extension and Flexion Combined  EMR Notes  HEP - Sets 1  Reps 25  Sessions per day 3-4  Notes gentle, pain free  Active Range of Motion Combined Radial and Ulnar Deviation  EMR Notes  HEP - Sets 1  Reps 25  Sessions per day 3-4  Notes pain free  Forearm Active Range of Motion Combined Pronation and Supination  EMR Notes  HEP - Sets 1  Reps 25  Sessions per day 3-4  Notes  Intrinsics Active Range of Motion Hook Fist  EMR Notes  HEP - Sets 1  Reps 25  Sessions per day 3-4  Notes  Intrinsics Active Range of Motion Table Top Bending  EMR Notes  HEP - Sets 1  Reps 25  Sessions per day 3-4  Notes pain free, gentle motion  Finger Active Range of Motion Tendon Glides Fist Series  EMR Notes  HEP - Sets 1  Reps 25  Sessions per day 3-4  Notes  Thumb Active Range of Motion Opposition  EMR Notes  HEP - Sets  Reps 25  Sessions per day 3-4  Notes  Thumb Active Range of Motion Radial Abduction and Extension  EMR Notes  HEP - Sets 1  Reps 25  Sessions per day 3-4  Notes *Start this exercise with your palm hanging off the table     Next visit/ Plan:   Progress motion, check EPL excursion

## 2022-06-24 NOTE — NURSING NOTE
Reason For Visit:   Chief Complaint   Patient presents with     RECHECK     Rt distal radius fracture DOI 4/23/22       Primary MD: Andrew Nair  Ref. MD: FARHAN    Age: 67 year old    ?  No      LMP 11/30/2004       Pain Assessment  Patient Currently in Pain: No (Aching pain with activity in right wrist 4/10)    Hand Dominance Evaluation  Hand Dominance: Right          QuickDASH Assessment  QuickDASH Main 4/28/2022   1. Open a tight or new jar. Unable   2. Do heavy household chores (e.g., wash walls, floors) Severe difficulty   3. Carry a shopping bag or briefcase. Moderate difficulty   4. Wash your back. Severe difficulty   5. Use a knife to cut food. Severe difficulty   6. Recreational activities in which you take some force or impact through your arm, shoulder or hand (e.g., golf, hammering, tennis, etc.). Unable   7. During the past week, to what extent has your arm, shoulder or hand problem interfered with your normal social activities with family, friends, neighbours or groups? Moderately   8. During the past week, were you limited in your work or other regular daily activities as a result of your arm, shoulder or hand problem? Unable   9. Arm, shoulder or hand pain. Severe   10.Tingling (pins and needles) in your arm,shoulder or hand. None   11. During the past week, how much difficulty have you had sleeping because of the pain in your arm, shoulder or hand? Moderate difficulty   Quickdash Ability Score 68.18          Current Outpatient Medications   Medication Sig Dispense Refill     clobetasol (TEMOVATE) 0.05 % ointment        doxycycline hyclate (VIBRAMYCIN) 100 MG capsule Take 1 capsule (100 mg) by mouth 2 times daily for 14 days 28 capsule 0     fluocinolone (SYNALAR) 0.01 % external solution        omeprazole (PRILOSEC) 20 MG DR capsule Take 1 capsule (20 mg) by mouth 2 times daily 30 capsule 0     PREMARIN vaginal cream        SUMAtriptan (IMITREX) 100 MG tablet TAKE ONE TABLET BY MOUTH AT  ONSET OF HEADACHE FOR MIGRAINE, MAY REPEAT DOSE AFTER 2 HOURS IF NEEDED. DO NOT TAKE MORE THAN 200MG IN 24 HOURS. 9 tablet 0     tretinoin (RETIN-A) 0.025 % cream          Allergies   Allergen Reactions     Dust Mite Extract      Grass        MANOLO Bello

## 2022-06-24 NOTE — LETTER
6/24/2022         RE: Carol Barkley  [address]  [facility] [address]        Dear Colleague,    Thank you for referring your patient, Carol Barkley, to the [facility] [place]. Please see a copy of my visit note below.    Hand Surgery Follow up Visit    PRIMARY CARE PHYSICIAN: Andrew Nair           Chief Complaint:   RECHECK (Rt distal radius fracture DOI 4/23/22)    History of Present Illness:     Carol Barkley is a 67 year old female who presents for follow up evaluation of right closed distal radius fracture.    Date of injury: 4/23/22  Non-Operative Intervention performed: Short arm casting    Pain has been well controlled. Denies any numbness or tingling.          Past Medical, Surgical, Social, and Family History:   Reviewed in the chart.          Allergies:     Allergies   Allergen Reactions     Dust Mite Extract      Grass             Medications:     Current Outpatient Medications   Medication     clobetasol (TEMOVATE) 0.05 % ointment     doxycycline hyclate (VIBRAMYCIN) 100 MG capsule     fluocinolone (SYNALAR) 0.01 % external solution     omeprazole (PRILOSEC) 20 MG DR capsule     PREMARIN vaginal cream     SUMAtriptan (IMITREX) 100 MG tablet     tretinoin (RETIN-A) 0.025 % cream     No current facility-administered medications for this visit.             Review of Systems:     A 10 point ROS was performed and reviewed. Specific responses to these questions are noted at the end of the document.         Physical Exam:   Physical Exam Adult:  General: Well-nourished, alert, cooperative with exam and in no acute distress    Musculoskeletal: A focused physical examination of the right wrist reveals:   Inspection- Skin is clean and dry. Mild edema. No ecchymosis.   Palpation- MInimal tenderness over the distal radius, mild tenderness over radial styloid and first dorsal compartment.   Neurovascular- intact light touch sensation and motor to distribution of the  median, ulnar and radial nerves. Brisk capillary refill to the distal fingers. 2+ radial pulse.   Range of Motion: Able to fully flex and extend all fingers and thumb within the confines of cast.   Muscle strength and tone: 5/5 strength FPL, APB, first dorsal interosseous.  Very weakly fires EPL, limited by pain. Slight Extensor lag.            Imaging:   3 view X-ray of the right wirst was independently interpreted by me. The results were discussed with the patient.  Findings include: stable alignment of intraarticular distal radius fracture. Slight prominence of callus dorsally.            Assessment and Plan:   Assessment:  67 year old female with nondisplaced intra-articular distal radius fracture. Suspect possible deQuervain tenosynovitis. Question EPL tenosynovitis vs EPL injury from dorsal bone spurring.      Plan: Plan to initiate therapies today.  May start active range of motion.  She should continue to be nonweightbearing, lifting nothing more than a coffee mug.  We discussed at length her radial sided wrist pain is likely due to some mild de Quervain's tenosynovitis from the cast.  She does have a slight extensor lag to the thumb, question possible EPL tenosynovitis versus EPL injury from dorsal bone spur.  Discussed at length with Shakira of hand therapy.  We will continue to monitor and see if a splint change and some tendon gliding will improve EPL firing.  Shakira with the patient will contact me if there is any concern for EPL tendon rupture.  If that is the case we will order a ultrasound to evaluate tendon continuity.  We will likely discontinue Exos brace today and have hand therapy fabricate a forearm-based wrist orthosis as the access is difficult to get on and off for the patient for regular therapy and hygiene.  Patient will follow-up in 2 weeks for recheck.  Knows to contact us in the interim with any questions or concerns.     HANNAH ACEVEDO PA-C  6/24/2022 12:13 PM   Orthopaedic Surgery

## 2022-06-30 ENCOUNTER — THERAPY VISIT (OUTPATIENT)
Dept: OCCUPATIONAL THERAPY | Facility: CLINIC | Age: 67
End: 2022-06-30
Payer: COMMERCIAL

## 2022-06-30 DIAGNOSIS — M25.531 RIGHT WRIST PAIN: Primary | ICD-10-CM

## 2022-06-30 PROCEDURE — 97140 MANUAL THERAPY 1/> REGIONS: CPT | Mod: GO | Performed by: OCCUPATIONAL THERAPIST

## 2022-06-30 PROCEDURE — 97110 THERAPEUTIC EXERCISES: CPT | Mod: GO | Performed by: OCCUPATIONAL THERAPIST

## 2022-06-30 NOTE — PROGRESS NOTES
"SOAP note objective information for 6/30/2022.    Diagnosis: Closed right distal radius fracture, stable, with intra-articular extension at the level of the lunate fossa  DOI: fell 4/23/22     Procedure:  velcro wrist brace and cast  Post:  9w 5d    Subjective:  Carol Barkley is a 67 year old female.     Imaging: Acute mildly displaced distal radial metaphyseal fracture suspected. Cortical irregularity and offset along the dorsal margin of the distal radial metaphysis.      Current occupation:The patient has an organist part-time.    She also takes care of her 2-year-old and 3-month-old grandchildren a several days a week.  Job Tasks: Lifting, Carrying, Repetitive Tasks    Objective:  Pain Level (Scale 0-10)    4/29/2022 6/3/22   At Rest 5 0/10   With Use 5 6/10      Pain Description  Date 4/29/2022   Location Dorsal radial wrist   Pain Quality Aching and Tender   Frequency constant     Pain is worst  daytime or nighttime   Exacerbated by  the OTC brace was not comfortable.  Cast seems much better.   Relieved by NSAIDs   Progression  improving      Sensation    WNL throughout all nerve distributions; per patient report     Edema:  mild of the  fingers     Shoulder Screen: Good shoulder motion per patient     ROM  Pain Report:  -none + mild    ++ moderate    +++ severe   Elbow 4/29/2022 4/29/2022 6/3/22   AROM (PROM) R L R   Extension  WNL  WNL WNL   Flexion  WNL  WNL WNL      Wrist 4/29/2022 4/29/2022 6/24/22 6/30/22   AROM (PROM) R L R R   Extension  limited by cast  WNL 42 47   Flexion \" \" 14 19   RD \" \" 8 14   UD \" \" 16 24   Supination \" \" 87 88   Pronation mild limitation \" 67 82      ROM of Fingers:  4/29/2022 Finger ROM is mildly stiff, but overall very good, in  all planes  Digit-o-meter 5/13/2022   Index Finger 0   Long Finger 0   Ring Finger 0   Small Finger 0           6/3/2022: Improvement in digit flexion/extension within exos brace. Reviewed finger ROM exercises within exos brace.     Thumb 4/29/2022 " 4/29/2022 6/24/22 6/30/22   AROM (PROM) R L L L   MP / / -23 -28   IP / / -26 -19   RABD     48    PABD     49    Retropulsion         Kapandji Opposition Scale (0-10/10) 6 10        Please refer to the daily flowsheet for treatment today, total treatment time and time spent performing 1:1 timed codes.    Assessment:  Response to therapy has been improvement to:  ROM of Wrist:  All Planes    Overall Assessment:  Patient is ready to progress to more complex exercises.  Patient would benefit from continued therapy to achieve rehab potential  STG/LTG:  STGoals have been reviewed and no progress has been made;  see goal sheet for details and changes.    Plan:  Frequency/Duration:  Recommend continuing with the current treatment plan. 3 X a month, once daily  for 2 months  Appropriateness of Rx I have re-evaluated this patient and find that the nature, scope, duration and intensity of the therapy is appropriate for the medical condition of the patient.  Recommendations for Continued Therapy  Additions to Treatment Plan -  Therapeutic Exercise:  AROM, AAROM and PROM  Orthotic Fabrication:  Static and Forearm based    Treatment Plan:  Modalities:  Fluidotherapy and Paraffin  Therapeutic Exercise:  AROM, AAROM, PROM, Tendon Gliding, Isotonics, Isometrics and Stabilization  Neuromuscular re-education:  Coordination/Dexterity and Proprioceptive Training  Manual Techniques:  Myofascial release  Orthotic Fabrication:  Static and Forearm based  Self Care:  Self Care Tasks, Ergonomic Considerations and Work Tasks    Home Program:  Orthosis Wear and Care  EMR Notes  HEP - Sets  Reps  Sessions per day  Notes  Wrist Active Range of Motion Extension and Flexion Combined  EMR Notes  HEP - Sets 1  Reps 25  Sessions per day 3-4  Notes gentle, pain free  Active Range of Motion Combined Radial and Ulnar Deviation  EMR Notes  HEP - Sets 1  Reps 25  Sessions per day 3-4  Notes pain free  Forearm Active Range of Motion Combined Pronation and  Supination  EMR Notes  HEP - Sets 1  Reps 25  Sessions per day 3-4  Notes  Intrinsics Active Range of Motion Hook Fist  EMR Notes  HEP - Sets 1  Reps 25  Sessions per day 3-4  Notes  Intrinsics Active Range of Motion Table Top Bending  EMR Notes  HEP - Sets 1  Reps 25  Sessions per day 3-4  Notes pain free, gentle motion  Finger Active Range of Motion Tendon Glides Fist Series  EMR Notes  HEP - Sets 1  Reps 25  Sessions per day 3-4  Notes  Thumb Active Range of Motion Opposition  EMR Notes  HEP - Sets  Reps 25  Sessions per day 3-4  Notes  Thumb Active Range of Motion Radial Abduction and Extension  EMR Notes  HEP - Sets 1  Reps 25  Sessions per day 3-4  Notes *Start this exercise with your palm hanging off the table     Next visit/ Plan:   Progress motion, check EPL excursion

## 2022-07-06 DIAGNOSIS — S52.501D CLOSED FRACTURE OF DISTAL END OF RIGHT RADIUS WITH ROUTINE HEALING, UNSPECIFIED FRACTURE MORPHOLOGY, SUBSEQUENT ENCOUNTER: Primary | ICD-10-CM

## 2022-07-08 ENCOUNTER — OFFICE VISIT (OUTPATIENT)
Dept: ORTHOPEDICS | Facility: CLINIC | Age: 67
End: 2022-07-08
Payer: COMMERCIAL

## 2022-07-08 ENCOUNTER — THERAPY VISIT (OUTPATIENT)
Dept: OCCUPATIONAL THERAPY | Facility: CLINIC | Age: 67
End: 2022-07-08
Payer: COMMERCIAL

## 2022-07-08 DIAGNOSIS — M25.531 RIGHT WRIST PAIN: Primary | ICD-10-CM

## 2022-07-08 DIAGNOSIS — M77.8 EXTENSOR POLLICIS LONGUS TENDINITIS: ICD-10-CM

## 2022-07-08 DIAGNOSIS — S66.811A RUPTURE OF EXTENSOR TENDONS OF RIGHT HAND AND WRIST, INITIAL ENCOUNTER: ICD-10-CM

## 2022-07-08 DIAGNOSIS — S69.91XA WRIST INJURY, RIGHT, INITIAL ENCOUNTER: Primary | ICD-10-CM

## 2022-07-08 DIAGNOSIS — S52.501D CLOSED FRACTURE OF DISTAL END OF RIGHT RADIUS WITH ROUTINE HEALING, UNSPECIFIED FRACTURE MORPHOLOGY, SUBSEQUENT ENCOUNTER: ICD-10-CM

## 2022-07-08 PROCEDURE — 99214 OFFICE O/P EST MOD 30 MIN: CPT | Performed by: PHYSICIAN ASSISTANT

## 2022-07-08 PROCEDURE — 97110 THERAPEUTIC EXERCISES: CPT | Mod: GO | Performed by: OCCUPATIONAL THERAPIST

## 2022-07-08 NOTE — PROGRESS NOTES
"SOAP note objective information for 7/8/2022.  Please refer to the daily flowsheet for treatment today, total treatment time and time spent performing 1:1 timed codes.    Diagnosis: Closed right distal radius fracture, stable, with intra-articular extension at the level of the lunate fossa  DOI: [email or web address] 4/23/22     Procedure:  velcro wrist brace and cast  Post:  10w 6d    Subjective:  Carol Barkley is a 67 year old female.     Imaging: Acute mildly displaced distal radial metaphyseal fracture suspected. Cortical irregularity and offset along the dorsal margin of the distal radial metaphysis.      Current occupation:The patient is an organist part-time.    She also takes care of her 2-year-old and 3-month-old grandchildren a several days a week.  Job Tasks: Lifting, Carrying, Repetitive Tasks    Objective:  Pain Level (Scale 0-10)    4/29/2022 6/3/22   At Rest 5 0/10   With Use 5 6/10      Pain Description  Date 4/29/2022   Location Dorsal radial wrist   Pain Quality Aching and Tender   Frequency constant     Pain is worst  daytime or nighttime   Exacerbated by  the OTC brace was not comfortable.  Cast seems much better.   Relieved by NSAIDs   Progression  improving      Sensation    WNL throughout all nerve distributions; per patient report.     ROM  Pain Report:  -none + mild    ++ moderate    +++ severe   Elbow 4/29/2022 4/29/2022 6/3/22   AROM (PROM) R L R   Extension  WNL  WNL WNL   Flexion  WNL  WNL WNL      Wrist 4/29/2022 4/29/2022 6/24/22 6/30/22 7/8/2022   AROM (PROM) R L R R R   Extension  limited by cast  70 42 47 52   Flexion \" 70 14 19 30   RD \" \" 8 14 15   UD \" \" 16 24 40   Supination \" \" 87 88 WNL   Pronation mild limitation \" 67 82 WNL      ROM of Fingers:  4/29/2022 Finger ROM is mildly stiff, but overall very good, in  all planes  Digit-o-meter 5/13/2022   Index Finger 0   Long Finger 0   Ring Finger 0   Small Finger 0   7/8/2022- Able to make a full fist.     Thumb 4/29/2022 4/29/2022 6/24/22 6/30/22 " 7/8/2022   AROM (PROM) R L L L R   MP / / -23 -28 -25/55   IP / / -26 -19 -15/40   RABD     48  59   PABD     49     Retropulsion          Kapandji Opposition Scale (0-10/10) 6 10       7/8/2022- Patient unable to retropulse thumb from table. Unable to perform isolated thumb IP extension.      Home Program:  Orthosis Wear and Care  EMR Notes  HEP - Sets  Reps  Sessions per day  Notes  Wrist Active Range of Motion Extension and Flexion Combined  EMR Notes  HEP - Sets 1  Reps 25  Sessions per day 3-4  Notes gentle, pain free  Active Range of Motion Combined Radial and Ulnar Deviation  EMR Notes  HEP - Sets 1  Reps 25  Sessions per day 3-4  Notes pain free  Forearm Active Range of Motion Combined Pronation and Supination  EMR Notes  HEP - Sets 1  Reps 25  Sessions per day 3-4  Notes  Intrinsics Active Range of Motion Hook Fist  EMR Notes  HEP - Sets 1  Reps 25  Sessions per day 3-4  Notes  Intrinsics Active Range of Motion Table Top Bending  EMR Notes  HEP - Sets 1  Reps 25  Sessions per day 3-4  Notes pain free, gentle motion  Finger Active Range of Motion Tendon Glides Fist Series  EMR Notes  HEP - Sets 1  Reps 25  Sessions per day 3-4  Notes  Thumb Active Range of Motion Opposition  EMR Notes  HEP - Sets  Reps 25  Sessions per day 3-4  Notes  Thumb Active Range of Motion Radial Abduction and Extension  EMR Notes  HEP - Sets 1  Reps 25  Sessions per day 3-4  Notes *Start this exercise with your palm hanging off the table     Next visit/ Plan:   Progress motion, check EPL excursion

## 2022-07-08 NOTE — LETTER
7/8/2022         RE: Carol Barkley  [address]  [facility] [address]        Dear Colleague,    Thank you for referring your patient, Carol Barkley, to the [facility] [place]. Please see a copy of my visit note below.    Hand Surgery Follow up Visit    PRIMARY CARE PHYSICIAN: Andrew Nair           Chief Complaint:   RECHECK (Right distal radius fracture DOI: 4/22/22)    History of Present Illness:     Carol Barkley is a 67 year old female who presents for follow up evaluation of right closed distal radius fracture.    Date of injury: 4/23/22  Non-Operative Intervention performed: 8 week immobilization with Exos brace.     Patient was last seen 2 weeks ago at which time there was possible concern for FPL l rupture.  We discontinued Exos brace last time and she was fitted for a thermoplastic removable wrist brace.  Patient has been wearing this for protection and progressing her therapies.  While she feels that she continues to improve with wrist range of motion, she has been unable to extend her thumb.  This has not been any therapies over the past 2 weeks.  Pain is been well controlled, denies any numbness or tingling         Past Medical, Surgical, Social, and Family History:   Reviewed in the chart.          Allergies:     Allergies   Allergen Reactions     Dust Mite Extract      Grass             Medications:     Current Outpatient Medications   Medication     clobetasol (TEMOVATE) 0.05 % ointment     fluocinolone (SYNALAR) 0.01 % external solution     omeprazole (PRILOSEC) 20 MG DR capsule     PREMARIN vaginal cream     SUMAtriptan (IMITREX) 100 MG tablet     tretinoin (RETIN-A) 0.025 % cream     No current facility-administered medications for this visit.             Review of Systems:     A 10 point ROS was performed and reviewed. Specific responses to these questions are noted at the end of the document.         Physical Exam:   Physical Exam Adult:  General:  Well-nourished, alert, cooperative with exam and in no acute distress    Musculoskeletal: A focused physical examination of the right wrist reveals:   Inspection- Skin is clean and dry. Mild edema. No ecchymosis.   Palpation- MInimal tenderness over the distal radius, mild tenderness over radial styloid and first dorsal compartment, improved.  Neurovascular- intact light touch sensation and motor to distribution of the median, ulnar and radial nerves. Brisk capillary refill to the distal fingers. 2+ radial pulse.   Range of Motion: Per hand therapy note today, wrist flexion to 30 degrees, wrist extension to 52 degrees, full pronation and supination.  Muscle strength and tone: 5/5 strength FPL, APB, first dorsal interosseous.  Unable to fire EPL, Slight Extensor lag.  Unable to perform thumb retropulsion.           Imaging:   3 view X-ray of the right wirst was independently interpreted by me. The results were discussed with the patient.  Findings include: stable alignment of healing intraarticular distal radius fracture. Slight prominence of callus dorsally.            Assessment and Plan:   Assessment:  67 year old female with nondisplaced intra-articular distal radius fracture.  Concern for EPL rupture from dorsal bone spurring.      Plan: She should continue to wear the brace for protection.  He may remove for light activities at home.  Continue with range of motion exercises.  We will obtain MSK ultrasound to further evaluate EPL congruity.  I will contact the patient following ultrasound next steps. Knows to contact us in the interim with any questions or concerns.     HANNAH ACEVEDO PA-C  Orthopaedic Surgery

## 2022-07-08 NOTE — NURSING NOTE
Reason For Visit:   Chief Complaint   Patient presents with     RECHECK     Right distal radius fracture DOI: 4/22/22       Primary MD: Andrew Nair  Ref. MD: FARHAN    Age: 67 year old    ?  No      LMP 11/30/2004       Pain Assessment  Patient Currently in Pain: No    Hand Dominance Evaluation  Hand Dominance: Right          QuickDASH Assessment  QuickDASH Main 4/28/2022   1. Open a tight or new jar. Unable   2. Do heavy household chores (e.g., wash walls, floors) Severe difficulty   3. Carry a shopping bag or briefcase. Moderate difficulty   4. Wash your back. Severe difficulty   5. Use a knife to cut food. Severe difficulty   6. Recreational activities in which you take some force or impact through your arm, shoulder or hand (e.g., golf, hammering, tennis, etc.). Unable   7. During the past week, to what extent has your arm, shoulder or hand problem interfered with your normal social activities with family, friends, neighbours or groups? Moderately   8. During the past week, were you limited in your work or other regular daily activities as a result of your arm, shoulder or hand problem? Unable   9. Arm, shoulder or hand pain. Severe   10.Tingling (pins and needles) in your arm,shoulder or hand. None   11. During the past week, how much difficulty have you had sleeping because of the pain in your arm, shoulder or hand? Moderate difficulty   Quickdash Ability Score 68.18          Current Outpatient Medications   Medication Sig Dispense Refill     clobetasol (TEMOVATE) 0.05 % ointment        fluocinolone (SYNALAR) 0.01 % external solution        omeprazole (PRILOSEC) 20 MG DR capsule Take 1 capsule (20 mg) by mouth 2 times daily 30 capsule 0     PREMARIN vaginal cream        SUMAtriptan (IMITREX) 100 MG tablet TAKE ONE TABLET BY MOUTH AT ONSET OF HEADACHE FOR MIGRAINE, MAY REPEAT DOSE AFTER 2 HOURS IF NEEDED. DO NOT TAKE MORE THAN 200MG IN 24 HOURS. 9 tablet 0     tretinoin (RETIN-A) 0.025 % cream           Allergies   Allergen Reactions     Dust Mite Extract      Grass        MANOLO Bello

## 2022-07-13 NOTE — PROGRESS NOTES
Hand Surgery Follow up Visit    PRIMARY CARE PHYSICIAN: Andrew Nair           Chief Complaint:   RECHECK (Right distal radius fracture DOI: 4/22/22)    History of Present Illness:     Carol Barkley is a 67 year old female who presents for follow up evaluation of right closed distal radius fracture.    Date of injury: 4/23/22  Non-Operative Intervention performed: 8 week immobilization with Exos brace.     Patient was last seen 2 weeks ago at which time there was possible concern for FPL l rupture.  We discontinued Exos brace last time and she was fitted for a thermoplastic removable wrist brace.  Patient has been wearing this for protection and progressing her therapies.  While she feels that she continues to improve with wrist range of motion, she has been unable to extend her thumb.  This has not been any therapies over the past 2 weeks.  Pain is been well controlled, denies any numbness or tingling         Past Medical, Surgical, Social, and Family History:   Reviewed in the chart.          Allergies:     Allergies   Allergen Reactions     Dust Mite Extract      Grass             Medications:     Current Outpatient Medications   Medication     clobetasol (TEMOVATE) 0.05 % ointment     fluocinolone (SYNALAR) 0.01 % external solution     omeprazole (PRILOSEC) 20 MG DR capsule     PREMARIN vaginal cream     SUMAtriptan (IMITREX) 100 MG tablet     tretinoin (RETIN-A) 0.025 % cream     No current facility-administered medications for this visit.             Review of Systems:     A 10 point ROS was performed and reviewed. Specific responses to these questions are noted at the end of the document.         Physical Exam:   Physical Exam Adult:  General: Well-nourished, alert, cooperative with exam and in no acute distress    Musculoskeletal: A focused physical examination of the right wrist reveals:   Inspection- Skin is clean and dry. Mild edema. No ecchymosis.   Palpation- MInimal tenderness over the distal  radius, mild tenderness over radial styloid and first dorsal compartment, improved.  Neurovascular- intact light touch sensation and motor to distribution of the median, ulnar and radial nerves. Brisk capillary refill to the distal fingers. 2+ radial pulse.   Range of Motion: Per hand therapy note today, wrist flexion to 30 degrees, wrist extension to 52 degrees, full pronation and supination.  Muscle strength and tone: 5/5 strength FPL, APB, first dorsal interosseous.  Unable to fire EPL, Slight Extensor lag.  Unable to perform thumb retropulsion.           Imaging:   3 view X-ray of the right wirst was independently interpreted by me. The results were discussed with the patient.  Findings include: stable alignment of healing intraarticular distal radius fracture. Slight prominence of callus dorsally.            Assessment and Plan:   Assessment:  67 year old female with nondisplaced intra-articular distal radius fracture.  Concern for EPL rupture from dorsal bone spurring.      Plan: She should continue to wear the brace for protection.  He may remove for light activities at home.  Continue with range of motion exercises.  We will obtain MSK ultrasound to further evaluate EPL congruity.  I will contact the patient following ultrasound next steps. Knows to contact us in the interim with any questions or concerns.     HANNAH ACEVEDO PA-C  Orthopaedic Surgery

## 2022-07-18 ENCOUNTER — ANCILLARY PROCEDURE (OUTPATIENT)
Dept: ULTRASOUND IMAGING | Facility: CLINIC | Age: 67
End: 2022-07-18
Attending: PHYSICIAN ASSISTANT
Payer: COMMERCIAL

## 2022-07-18 DIAGNOSIS — S66.811A RUPTURE OF EXTENSOR TENDONS OF RIGHT HAND AND WRIST, INITIAL ENCOUNTER: ICD-10-CM

## 2022-07-18 PROCEDURE — 76882 US LMTD JT/FCL EVL NVASC XTR: CPT | Performed by: RADIOLOGY

## 2022-07-25 ENCOUNTER — OFFICE VISIT (OUTPATIENT)
Dept: ORTHOPEDICS | Facility: CLINIC | Age: 67
End: 2022-07-25
Payer: COMMERCIAL

## 2022-07-25 DIAGNOSIS — S66.811A EXTENSOR TENDON RUPTURE OF HAND, RIGHT, INITIAL ENCOUNTER: Primary | ICD-10-CM

## 2022-07-25 PROCEDURE — 99214 OFFICE O/P EST MOD 30 MIN: CPT | Performed by: STUDENT IN AN ORGANIZED HEALTH CARE EDUCATION/TRAINING PROGRAM

## 2022-07-25 NOTE — PROGRESS NOTES
Hand Surgery Follow up Visit    PRIMARY CARE PHYSICIAN: Andrew Nair           Chief Complaint:   RECHECK (Right Distal Radius.  Non-op EPI rupture Ultrasound review.  Patient reports no change)    History of Present Illness:     Carol Barkley is a 67 year old female who presents for follow up evaluation of right closed distal radius fracture.    Date of injury: 4/23/22  Non-Operative Intervention performed: 8 week immobilization with Exos brace.     Patient was last seen 7/8/22. She is recovering well from her distal radius fx but has developed an EPL rupture that is quite limiting to daily activities. 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           Past Medical, Surgical, Social, and Family History:   Reviewed in the chart.          Allergies:     Allergies   Allergen Reactions     Dust Mite Extract      Grass             Medications:     Current Outpatient Medications   Medication     clobetasol (TEMOVATE) 0.05 % ointment     fluocinolone (SYNALAR) 0.01 % external solution     omeprazole (PRILOSEC) 20 MG DR capsule     PREMARIN vaginal cream     SUMAtriptan (IMITREX) 100 MG tablet     tretinoin (RETIN-A) 0.025 % cream     No current facility-administered medications for this visit.             Review of Systems:     A 10 point ROS was performed and reviewed. Specific responses to these questions are noted at the end of the document.         Physical Exam:   Physical Exam Adult:  General: Well-nourished, alert, cooperative with exam and in no acute distress    Musculoskeletal: A focused physical examination of the right wrist reveals:   Inspection- Skin is clean and dry. Mild edema. No ecchymosis.   Palpation-no tenderness over distal radius, prominence over the Christopher's tubercle  Neurovascular- intact light touch sensation and motor to distribution of the median, ulnar and radial nerves. Brisk capillary refill to the distal fingers. 2+ radial pulse.   Range of  Motion: wrist flexion to 50 degrees, wrist extension to 80, full pronation and supination.  Muscle strength and tone: 5/5 strength FPL, APB, first dorsal interosseous.  Unable to fire EPL, Slight Extensor lag.  Unable to perform thumb retropulsion.  Thumb joints remain supple         Imaging:   3 view X-ray of the right wirst was independently interpreted by me. The results were discussed with the patient.  Findings include: stable alignment of healing intraarticular distal radius fracture.  Interval healing is seen.  Slight prominence of callus dorsally.     Ultrasound 7/18/22 personally reviewed by me                                                                   Impression:      Complete tear of the extensor pollicis longus at the level of Christopher's  tubercle with distal retraction of the tendon just distal to the  intersection with the second extensor compartment.           Assessment and Plan:   Assessment:  67 year old female with nondisplaced intra-articular distal radius fracture.  Now with subacute EPL rupture     I discussed that the pathophysiology and natural history of EPL rupture following nondisplaced distal radius fractures.  At this point time I have recommended an EIP to EPL tendon transfer.  We discussed the details of the surgery as well as the risks and benefits and postoperative recovery courses.  Patient to begin EIP strengthening in preparation for surgery.    The indications for surgery were discussed with the patient. The benefits, risks, and alternatives of operative management were discussed in detail with the patient. The patient understands that the risks of surgery include, but are not limited to: infection, bleeding, injury to nearby structures (such as nerves, blood vessels, and tendons), repair rupture, stiffness, need for additional surgery, pain, stiffness, scarring, need for rehabilitation, and anesthetic complications.  Patient expressed understanding and elected to proceed  with surgery. All questions were answered to the patient's satisfaction.    Case request placed.    Geraldo Fischer MD    Hand, Upper Extremity & Microvascular Surgery  Department of Orthopedic Surgery  [facility]

## 2022-07-25 NOTE — LETTER
7/25/2022         RE: Carol Barkley  [address]  [facility] [address]        Dear Colleague,    Thank you for referring your patient, Carol Barkley, to the [facility] [place]. Please see a copy of my visit note below.    Hand Surgery Follow up Visit    PRIMARY CARE PHYSICIAN: Andrew Nair           Chief Complaint:   RECHECK (Right Distal Radius.  Non-op EPI rupture Ultrasound review.  Patient reports no change)    History of Present Illness:     Carol Barkley is a 67 year old female who presents for follow up evaluation of right closed distal radius fracture.    Date of injury: 4/23/22  Non-Operative Intervention performed: 8 week immobilization with Exos brace.     Patient was last seen 7/8/22. She is recovering well from her distal radius fx but has developed an EPL rupture that is quite limiting to daily activities. 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           Past Medical, Surgical, Social, and Family History:   Reviewed in the chart.          Allergies:     Allergies   Allergen Reactions     Dust Mite Extract      Grass             Medications:     Current Outpatient Medications   Medication     clobetasol (TEMOVATE) 0.05 % ointment     fluocinolone (SYNALAR) 0.01 % external solution     omeprazole (PRILOSEC) 20 MG DR capsule     PREMARIN vaginal cream     SUMAtriptan (IMITREX) 100 MG tablet     tretinoin (RETIN-A) 0.025 % cream     No current facility-administered medications for this visit.             Review of Systems:     A 10 point ROS was performed and reviewed. Specific responses to these questions are noted at the end of the document.         Physical Exam:   Physical Exam Adult:  General: Well-nourished, alert, cooperative with exam and in no acute distress    Musculoskeletal: A focused physical examination of the right wrist reveals:   Inspection- Skin is clean and dry. Mild edema. No ecchymosis.    Palpation-no tenderness over distal radius, prominence over the Christopher's tubercle  Neurovascular- intact light touch sensation and motor to distribution of the median, ulnar and radial nerves. Brisk capillary refill to the distal fingers. 2+ radial pulse.   Range of Motion: wrist flexion to 50 degrees, wrist extension to 80, full pronation and supination.  Muscle strength and tone: 5/5 strength FPL, APB, first dorsal interosseous.  Unable to fire EPL, Slight Extensor lag.  Unable to perform thumb retropulsion.  Thumb joints remain supple         Imaging:   3 view X-ray of the right wirst was independently interpreted by me. The results were discussed with the patient.  Findings include: stable alignment of healing intraarticular distal radius fracture.  Interval healing is seen.  Slight prominence of callus dorsally.     Ultrasound 7/18/22 personally reviewed by me                                                                   Impression:      Complete tear of the extensor pollicis longus at the level of Christopher's  tubercle with distal retraction of the tendon just distal to the  intersection with the second extensor compartment.           Assessment and Plan:   Assessment:  67 year old female with nondisplaced intra-articular distal radius fracture.  Now with subacute EPL rupture     I discussed that the pathophysiology and natural history of EPL rupture following nondisplaced distal radius fractures.  At this point time I have recommended an EIP to EPL tendon transfer.  We discussed the details of the surgery as well as the risks and benefits and postoperative recovery courses.  Patient to begin EIP strengthening in preparation for surgery.    The indications for surgery were discussed with the patient. The benefits, risks, and alternatives of operative management were discussed in detail with the patient. The patient understands that the risks of surgery include, but are not limited to: infection, bleeding,  injury to nearby structures (such as nerves, blood vessels, and tendons), repair rupture, stiffness, need for additional surgery, pain, stiffness, scarring, need for rehabilitation, and anesthetic complications.  Patient expressed understanding and elected to proceed with surgery. All questions were answered to the patient's satisfaction.    Case request placed.    Geraldo Fischer MD    Hand, Upper Extremity & Microvascular Surgery  Department of Orthopedic Surgery  [facility]

## 2022-07-25 NOTE — NURSING NOTE
"Reason For Visit:   Chief Complaint   Patient presents with     RECHECK     Right Distal Radius.  Non-op EPI rupture Ultrasound review.  Patient reports no change       Primary MD: Andrew Nair  Ref. MD: sarahi    Age: 67 year old    ?  No      LMP 11/30/2004       Pain Assessment  Patient Currently in Pain: Yes (specific actions will cause \"zingers\")  0-10 Pain Scale: 4 (more stiffness)  Primary Pain Location: Wrist (right)  Pain Descriptors: Tightness (feels like she can not extend or open hand)    Hand Dominance Evaluation  Hand Dominance: Right          QuickDASH Assessment  QuickDASH Main 4/28/2022   1. Open a tight or new jar. Unable   2. Do heavy household chores (e.g., wash walls, floors) Severe difficulty   3. Carry a shopping bag or briefcase. Moderate difficulty   4. Wash your back. Severe difficulty   5. Use a knife to cut food. Severe difficulty   6. Recreational activities in which you take some force or impact through your arm, shoulder or hand (e.g., golf, hammering, tennis, etc.). Unable   7. During the past week, to what extent has your arm, shoulder or hand problem interfered with your normal social activities with family, friends, neighbours or groups? Moderately   8. During the past week, were you limited in your work or other regular daily activities as a result of your arm, shoulder or hand problem? Unable   9. Arm, shoulder or hand pain. Severe   10.Tingling (pins and needles) in your arm,shoulder or hand. None   11. During the past week, how much difficulty have you had sleeping because of the pain in your arm, shoulder or hand? Moderate difficulty   Quickdash Ability Score 68.18          Current Outpatient Medications   Medication Sig Dispense Refill     clobetasol (TEMOVATE) 0.05 % ointment        fluocinolone (SYNALAR) 0.01 % external solution        omeprazole (PRILOSEC) 20 MG DR capsule Take 1 capsule (20 mg) by mouth 2 times daily 30 capsule 0     PREMARIN vaginal cream    "     SUMAtriptan (IMITREX) 100 MG tablet TAKE ONE TABLET BY MOUTH AT ONSET OF HEADACHE FOR MIGRAINE, MAY REPEAT DOSE AFTER 2 HOURS IF NEEDED. DO NOT TAKE MORE THAN 200MG IN 24 HOURS. 9 tablet 0     tretinoin (RETIN-A) 0.025 % cream          Allergies   Allergen Reactions     Dust Mite Extract      Grass        RAJENDRA GUNDERSON, ATC

## 2022-07-28 ENCOUNTER — TELEPHONE (OUTPATIENT)
Dept: ORTHOPEDICS | Facility: CLINIC | Age: 67
End: 2022-07-28

## 2022-07-28 PROBLEM — S66.811A EXTENSOR TENDON RUPTURE OF HAND, RIGHT, INITIAL ENCOUNTER: Status: ACTIVE | Noted: 2022-07-28

## 2022-07-28 NOTE — TELEPHONE ENCOUNTER
Patient is scheduled for surgery with Dr. Fischer    Spoke with: Carol    Date of Surgery: 8/5/22    Location: [facility]    Informed patient they will need an adult  yes    Pre op with Provider n/a    H&P: Scheduled 8/2/22    Pre-procedure COVID-19 Test: 8/2/22    Additional imaging/appointments: POP scheduled    Surgery packet: Given in clinic     Additional comments: n/a

## 2022-08-02 ENCOUNTER — OFFICE VISIT (OUTPATIENT)
Dept: FAMILY MEDICINE | Facility: CLINIC | Age: 67
End: 2022-08-02
Payer: COMMERCIAL

## 2022-08-02 ENCOUNTER — LAB (OUTPATIENT)
Dept: LAB | Facility: CLINIC | Age: 67
End: 2022-08-02
Payer: COMMERCIAL

## 2022-08-02 VITALS
WEIGHT: 187 LBS | BODY MASS INDEX: 30.05 KG/M2 | TEMPERATURE: 98.3 F | DIASTOLIC BLOOD PRESSURE: 85 MMHG | OXYGEN SATURATION: 98 % | HEIGHT: 66 IN | HEART RATE: 90 BPM | SYSTOLIC BLOOD PRESSURE: 133 MMHG

## 2022-08-02 DIAGNOSIS — Z12.11 COLON CANCER SCREENING: ICD-10-CM

## 2022-08-02 DIAGNOSIS — Z12.31 VISIT FOR SCREENING MAMMOGRAM: ICD-10-CM

## 2022-08-02 DIAGNOSIS — Z01.818 PRE-OPERATIVE GENERAL PHYSICAL EXAMINATION: ICD-10-CM

## 2022-08-02 DIAGNOSIS — K21.9 GASTROESOPHAGEAL REFLUX DISEASE WITHOUT ESOPHAGITIS: ICD-10-CM

## 2022-08-02 DIAGNOSIS — S66.811A EXTENSOR TENDON RUPTURE OF HAND, RIGHT, INITIAL ENCOUNTER: ICD-10-CM

## 2022-08-02 DIAGNOSIS — Z01.818 PRE-OPERATIVE GENERAL PHYSICAL EXAMINATION: Primary | ICD-10-CM

## 2022-08-02 DIAGNOSIS — G43.909 MIGRAINE WITHOUT STATUS MIGRAINOSUS, NOT INTRACTABLE, UNSPECIFIED MIGRAINE TYPE: ICD-10-CM

## 2022-08-02 LAB
ALBUMIN SERPL-MCNC: 3.7 G/DL (ref 3.4–5)
ALP SERPL-CCNC: 73 U/L (ref 40–150)
ALT SERPL W P-5'-P-CCNC: 26 U/L (ref 0–50)
ANION GAP SERPL CALCULATED.3IONS-SCNC: 6 MMOL/L (ref 3–14)
AST SERPL W P-5'-P-CCNC: 20 U/L (ref 0–45)
BILIRUB SERPL-MCNC: 0.6 MG/DL (ref 0.2–1.3)
BUN SERPL-MCNC: 15 MG/DL (ref 7–30)
CALCIUM SERPL-MCNC: 9 MG/DL (ref 8.5–10.1)
CHLORIDE BLD-SCNC: 107 MMOL/L (ref 94–109)
CO2 SERPL-SCNC: 28 MMOL/L (ref 20–32)
CREAT SERPL-MCNC: 0.77 MG/DL (ref 0.52–1.04)
GFR SERPL CREATININE-BSD FRML MDRD: 84 ML/MIN/1.73M2
GLUCOSE BLD-MCNC: 100 MG/DL (ref 70–99)
HGB BLD-MCNC: 13.4 G/DL (ref 11.7–15.7)
POTASSIUM BLD-SCNC: 3.9 MMOL/L (ref 3.4–5.3)
PROT SERPL-MCNC: 7 G/DL (ref 6.8–8.8)
SARS-COV-2 RNA RESP QL NAA+PROBE: NEGATIVE
SODIUM SERPL-SCNC: 141 MMOL/L (ref 133–144)

## 2022-08-02 PROCEDURE — 80053 COMPREHEN METABOLIC PANEL: CPT | Performed by: PATHOLOGY

## 2022-08-02 PROCEDURE — 99213 OFFICE O/P EST LOW 20 MIN: CPT | Performed by: NURSE PRACTITIONER

## 2022-08-02 PROCEDURE — 36415 COLL VENOUS BLD VENIPUNCTURE: CPT | Performed by: PATHOLOGY

## 2022-08-02 PROCEDURE — 85018 HEMOGLOBIN: CPT | Performed by: PATHOLOGY

## 2022-08-02 PROCEDURE — U0005 INFEC AGEN DETEC AMPLI PROBE: HCPCS | Performed by: NURSE PRACTITIONER

## 2022-08-02 NOTE — PROGRESS NOTES
"[facility] [address]  [address] [facility] [address]  Phone: [phone]  Fax: [phone]  Primary Provider: Andrew Nair    PREOPERATIVE EVALUATION:  Today's date: 8/2/2022    Carol Barkley is a 67 year old female who presents for a preoperative evaluation.    Surgical Information:  Surgery/Procedure: RIGHT TENDON TRANSFER EXTENSOR INDICIS PROPRIUS TO EXTENSOR POLLICIS LONGUS  [place] [facility] [place]geon:Geraldo Fischer MD  Surgery Date: 08/05/2022  Time of Surgery: 11:15am  Where patient plans to recover: At home with family  Fax number for surgical facility: Note does not need to be faxed, will be available electronically in Epic.    Type of Anesthesia Anticipated: MAC with Block    Assessment & Plan     The proposed surgical procedure is considered INTERMEDIATE risk.    Pre-operative general physical examination  - Hemoglobin  - Comprehensive metabolic panel  - Asymptomatic COVID-19 Virus (Coronavirus) by PCR    Colon cancer screening  - Colonscopy Screening [facility] Referral    Visit for screening mammogram  - Mammogram, routine screening    Extensor tendon rupture of hand, right, initial encounter  - Followed by ortho     Migraine without status migrainosus, not intractable, unspecified migraine type  - imitrex as needed    Gastroesophageal reflux disease without esophagitis  - omeprazole as needed         RECOMMENDATION:  APPROVAL GIVEN to proceed with proposed procedure, without further diagnostic evaluation.        22 minutes spent on the date of the encounter doing chart review, history and exam, documentation and further activities per the note    {    Subjective     HPI related to upcoming procedure:   Patient seen by ortho for follow-up of right closed distal radius fracture.  She recovered well but had a EPL rupture. She is a professional organ player. Had a ultrasound 7/18/2022 which noted - \"Impression:      Complete tear of " "the extensor pollicis longus at the level of Christopher's  tubercle with distal retraction of the tendon just distal to the  intersection with the second extensor compartment.\"    An EIP to EPL tendon transfer was recommended.       Preop Questions 8/2/2022   1. Have you ever had a heart attack or stroke? No   2. Have you ever had surgery on your heart or blood vessels, such as a stent placement, a coronary artery bypass, or surgery on an artery in your head, neck, heart, or legs? No   3. Do you have chest pain with activity? No   4. Do you have a history of  heart failure? No   5. Do you currently have a cold, bronchitis or symptoms of other infection? No   6. Do you have a cough, shortness of breath, or wheezing? No   7. Do you or anyone in your family have previous history of blood clots? No   8. Do you or does anyone in your family have a serious bleeding problem such as prolonged bleeding following surgeries or cuts? No   9. Have you ever had problems with anemia or been told to take iron pills? No   10. Have you had any abnormal blood loss such as black, tarry or bloody stools, or abnormal vaginal bleeding? No   11. Have you ever had a blood transfusion? No   12. Are you willing to have a blood transfusion if it is medically needed before, during, or after your surgery? Yes   13. Have you or any of your relatives ever had problems with anesthesia? No   14. Do you have sleep apnea, excessive snoring or daytime drowsiness? No   15. Do you have any artifical heart valves or other implanted medical devices like a pacemaker, defibrillator, or continuous glucose monitor? No   16. Do you have artificial joints? No   17. Are you allergic to latex? No     Health Care Directive:  Patient does not have a Health Care Directive or Living Will: Discussed advance care planning with patient; information given to patient to review.    Preoperative Review of :   reviewed - controlled substances reflected in medication " list.      Status of Chronic Conditions:  See problem list for active medical problems.  Problems all longstanding and stable, except as noted/documented.  See ROS for pertinent symptoms related to these conditions.      Review of Systems  Constitutional, neuro, ENT, endocrine, pulmonary, cardiac, gastrointestinal, genitourinary, musculoskeletal, integument and psychiatric systems are negative, except as otherwise noted.    Patient Active Problem List    Diagnosis Date Noted     Extensor tendon rupture of hand, right, initial encounter 07/28/2022     Priority: Medium     Added automatically from request for surgery [id number]       Right wrist pain 05/01/2022     Priority: Medium     Female stress incontinence 11/09/2017     Priority: Medium     Chalazion of right upper eyelid 03/24/2017     Priority: Medium     Migraine without status migrainosus, not intractable, unspecified migraine type 12/04/2015     Priority: Medium     Seasonal allergic rhinitis 10/06/2015     Priority: Medium     GERD (gastroesophageal reflux disease) 01/09/2012     Priority: Medium     Pain in joint, lower leg 12/19/2011     Priority: Medium     Advanced directives, counseling/discussion 11/18/2011     Priority: Medium     Advance Directive Problem List Overview:   Name Relationship Phone    Primary Health Care Agent            Alternative Health Care Agent          Discussed advance care planning with patient; information given to patient to review. 11/18/2011          Raynaud phenomenon 12/01/2009     Priority: Medium     History of colonic polyps      Priority: Medium     Problem list name updated by automated process. Provider to review       Palpitations 05/17/2006     Priority: Medium     Anxiety state 04/17/2006     Priority: Medium     Problem list name updated by automated process. Provider to review       Disturbance of skin sensation 04/17/2006     Priority: Medium     discussed possible nerve compression with L arm numbness & L  upper back pain       Migraine without aura 12/07/2004     Priority: Medium     Problem list name updated by automated process. Provider to review        Past Medical History:   Diagnosis Date     Allergic rhinitis due to other allergen     Immunotherapy     CKD (chronic kidney disease) stage 2, GFR 60-89 ml/min      Cough     seen by Dr Freire and ENT, CXR NL     Cystourethrocele      Hearing loss      Lichen sclerosus et atrophicus of the vulva     Jama     Migraine without aura, without mention of intractable migraine without mention of status migrainosus      PAC (premature atrial contraction) 12/2009    Holter     Personal history of colonic polyps      Pure hypercholesterolemia      PVD (posterior vitreous detachment)      Rectocele      Unspecified sinusitis (chronic)      Urine, incontinence, stress female      Past Surgical History:   Procedure Laterality Date     BREAST BIOPSY, RT/LT       COLONOSCOPY  01/2014     COLONOSCOPY  1/8/2014    Procedure: COLONOSCOPY;  colonoscopy  ;  Surgeon: Jose Newton MD;  Location:  GI     HC REMOVAL GALLBLADDER      Cholecystectomy     HYSTERECTOMY VAGINAL  5/17/2011    w/ bilateral salphingo-oophorectomy, eterocele reapir w/ Jamie culdoplasty, anterior colporrhaphy, posterior colpoperineorrhaphy, pubovaginal sling (mini-Arc) placement, cystoscopy and suprapubic catheter palcement     HYSTERECTOMY, PAP NO LONGER INDICATED       HYSTEROSCOPY  2/2007    D&C     TUBAL LIGATION  1989     ZZC APPENDECTOMY       ZZC NARANJO W/O FACETEC FORAMOT/DSKC 1/2 VRT SEG, LUMBAR      L5-S1     ZZC STRESS ECHO (TREADMILL) FL  4/2006     Current Outpatient Medications   Medication Sig Dispense Refill     clobetasol (TEMOVATE) 0.05 % ointment        fluocinolone (SYNALAR) 0.01 % external solution        omeprazole (PRILOSEC) 20 MG DR capsule Take 1 capsule (20 mg) by mouth 2 times daily 30 capsule 0     SUMAtriptan (IMITREX) 100 MG tablet TAKE ONE TABLET BY MOUTH AT ONSET OF  "HEADACHE FOR MIGRAINE, MAY REPEAT DOSE AFTER 2 HOURS IF NEEDED. DO NOT TAKE MORE THAN 200MG IN 24 HOURS. 9 tablet 0     tretinoin (RETIN-A) 0.025 % cream        PREMARIN vaginal cream  (Patient not taking: Reported on 8/2/2022)         Allergies   Allergen Reactions     Dust Mite Extract      Grass         Social History     Tobacco Use     Smoking status: Never Smoker     Smokeless tobacco: Never Used   Substance Use Topics     Alcohol use: No     Comment: 1 drinks per week     Family History   Problem Relation Age of Onset     C.A.D. Father      Neurologic Disorder Father         parkinsons     Hypertension Father      Hyperlipidemia Father      Depression Father         Parkinsons related     Heart Disease Mother      Breast Cancer Mother         age 70 dx     Hypertension Sister      Cancer - colorectal Maternal Aunt      Cancer - colorectal Maternal Uncle      Cancer Paternal Uncle         bone     Colon Cancer Other         Maternal Uncle     Colon Cancer Other         Maternal Aunt     History   Drug Use No         Objective     /85   Pulse 90   Temp 98.3  F (36.8  C) (Oral)   Ht 1.676 m (5' 6\")   Wt 84.8 kg (187 lb)   LMP 11/30/2004   SpO2 98%   BMI 30.18 kg/m      Physical Exam    GENERAL APPEARANCE: healthy, alert and no distress     EYES: EOMI, PERRL     HENT: ear canals and TM's normal and nose and mouth without ulcers or lesions     NECK: no adenopathy, no asymmetry, masses, or scars and thyroid normal to palpation     RESP: lungs clear to auscultation - no rales, rhonchi or wheezes     CV: regular rates and rhythm, normal S1 S2, no S3 or S4 and no murmur, click or rub     ABDOMEN:  soft, nontender, no HSM or masses and bowel sounds normal     MS: extremities normal- no gross deformities noted, no evidence of inflammation in joints, FROM in all extremities.     SKIN: no suspicious lesions or rashes     NEURO: Normal strength and tone, sensory exam grossly normal, mentation intact and speech " normal     PSYCH: mentation appears normal. and affect normal/bright     LYMPHATICS: No cervical adenopathy    No results for input(s): HGB, PLT, INR, NA, POTASSIUM, CR, A1C in the last 83227 hours.     Diagnostics:  Labs pending at this time.  Results will be reviewed when available.   No EKG required, no history of coronary heart disease, significant arrhythmia, peripheral arterial disease or other structural heart disease.    Revised Cardiac Risk Index (RCRI):  The patient has the following serious cardiovascular risks for perioperative complications:   - No serious cardiac risks = 0 points     RCRI Interpretation: 0 points: Class I (very low risk - 0.4% complication rate)           Signed Electronically by: DORIS Smith, CNP  Copy of this evaluation report is provided to requesting physician.

## 2022-08-02 NOTE — H&P (VIEW-ONLY)
"[facility] [address]  [address] [facility] [address]  Phone: [phone]  Fax: [phone]  Primary Provider: Andrew Nair    PREOPERATIVE EVALUATION:  Today's date: 8/2/2022    Carol Barkley is a 67 year old female who presents for a preoperative evaluation.    Surgical Information:  Surgery/Procedure: RIGHT TENDON TRANSFER EXTENSOR INDICIS PROPRIUS TO EXTENSOR POLLICIS LONGUS  Surgery Location: [facility] [place]  Surgeon:Geraldo Fischer MD  Surgery Date: 08/05/2022  Time of Surgery: 11:15am  Where patient plans to recover: At home with family  Fax number for surgical facility: Note does not need to be faxed, will be available electronically in Epic.    Type of Anesthesia Anticipated: MAC with Block    Assessment & Plan     The proposed surgical procedure is considered INTERMEDIATE risk.    Pre-operative general physical examination  - Hemoglobin  - Comprehensive metabolic panel  - Asymptomatic COVID-19 Virus (Coronavirus) by PCR    Colon cancer screening  - Colonscopy Screening [facility] Referral    Visit for screening mammogram  - Mammogram, routine screening    Extensor tendon rupture of hand, right, initial encounter  - Followed by ortho     Migraine without status migrainosus, not intractable, unspecified migraine type  - imitrex as needed    Gastroesophageal reflux disease without esophagitis  - omeprazole as needed         RECOMMENDATION:  APPROVAL GIVEN to proceed with proposed procedure, without further diagnostic evaluation.        22 minutes spent on the date of the encounter doing chart review, history and exam, documentation and further activities per the note    {    Subjective     HPI related to upcoming procedure:   Patient seen by ortho for follow-up of right closed distal radius fracture.  She recovered well but had a EPL rupture. She is a professional organ player. Had a ultrasound 7/18/2022 which noted - \"Impression:      Complete tear of " "the extensor pollicis longus at the level of Christopher's  tubercle with distal retraction of the tendon just distal to the  intersection with the second extensor compartment.\"    An EIP to EPL tendon transfer was recommended.       Preop Questions 8/2/2022   1. Have you ever had a heart attack or stroke? No   2. Have you ever had surgery on your heart or blood vessels, such as a stent placement, a coronary artery bypass, or surgery on an artery in your head, neck, heart, or legs? No   3. Do you have chest pain with activity? No   4. Do you have a history of  heart failure? No   5. Do you currently have a cold, bronchitis or symptoms of other infection? No   6. Do you have a cough, shortness of breath, or wheezing? No   7. Do you or anyone in your family have previous history of blood clots? No   8. Do you or does anyone in your family have a serious bleeding problem such as prolonged bleeding following surgeries or cuts? No   9. Have you ever had problems with anemia or been told to take iron pills? No   10. Have you had any abnormal blood loss such as black, tarry or bloody stools, or abnormal vaginal bleeding? No   11. Have you ever had a blood transfusion? No   12. Are you willing to have a blood transfusion if it is medically needed before, during, or after your surgery? Yes   13. Have you or any of your relatives ever had problems with anesthesia? No   14. Do you have sleep apnea, excessive snoring or daytime drowsiness? No   15. Do you have any artifical heart valves or other implanted medical devices like a pacemaker, defibrillator, or continuous glucose monitor? No   16. Do you have artificial joints? No   17. Are you allergic to latex? No     Health Care Directive:  Patient does not have a Health Care Directive or Living Will: Discussed advance care planning with patient; information given to patient to review.    Preoperative Review of :   reviewed - controlled substances reflected in medication " list.      Status of Chronic Conditions:  See problem list for active medical problems.  Problems all longstanding and stable, except as noted/documented.  See ROS for pertinent symptoms related to these conditions.      Review of Systems  Constitutional, neuro, ENT, endocrine, pulmonary, cardiac, gastrointestinal, genitourinary, musculoskeletal, integument and psychiatric systems are negative, except as otherwise noted.    Patient Active Problem List    Diagnosis Date Noted     Extensor tendon rupture of hand, right, initial encounter 07/28/2022     Priority: Medium     Added automatically from request for surgery [id number]       Right wrist pain 05/01/2022     Priority: Medium     Female stress incontinence 11/09/2017     Priority: Medium     Chalazion of right upper eyelid 03/24/2017     Priority: Medium     Migraine without status migrainosus, not intractable, unspecified migraine type 12/04/2015     Priority: Medium     Seasonal allergic rhinitis 10/06/2015     Priority: Medium     GERD (gastroesophageal reflux disease) 01/09/2012     Priority: Medium     Pain in joint, lower leg 12/19/2011     Priority: Medium     Advanced directives, counseling/discussion 11/18/2011     Priority: Medium     Advance Directive Problem List Overview:   Name Relationship Phone    Primary Health Care Agent            Alternative Health Care Agent          Discussed advance care planning with patient; information given to patient to review. 11/18/2011          Raynaud phenomenon 12/01/2009     Priority: Medium     History of colonic polyps      Priority: Medium     Problem list name updated by automated process. Provider to review       Palpitations 05/17/2006     Priority: Medium     Anxiety state 04/17/2006     Priority: Medium     Problem list name updated by automated process. Provider to review       Disturbance of skin sensation 04/17/2006     Priority: Medium     discussed possible nerve compression with L arm numbness & L  upper back pain       Migraine without aura 12/07/2004     Priority: Medium     Problem list name updated by automated process. Provider to review        Past Medical History:   Diagnosis Date     Allergic rhinitis due to other allergen     Immunotherapy     CKD (chronic kidney disease) stage 2, GFR 60-89 ml/min      Cough     seen by Dr Freire and ENT, CXR NL     Cystourethrocele      Hearing loss      Lichen sclerosus et atrophicus of the vulva     Jama     Migraine without aura, without mention of intractable migraine without mention of status migrainosus      PAC (premature atrial contraction) 12/2009    Holter     Personal history of colonic polyps      Pure hypercholesterolemia      PVD (posterior vitreous detachment)      Rectocele      Unspecified sinusitis (chronic)      Urine, incontinence, stress female      Past Surgical History:   Procedure Laterality Date     BREAST BIOPSY, RT/LT       COLONOSCOPY  01/2014     COLONOSCOPY  1/8/2014    Procedure: COLONOSCOPY;  colonoscopy  ;  Surgeon: Jose Newton MD;  Location:  GI     HC REMOVAL GALLBLADDER      Cholecystectomy     HYSTERECTOMY VAGINAL  5/17/2011    w/ bilateral salphingo-oophorectomy, eterocele reapir w/ Jamie culdoplasty, anterior colporrhaphy, posterior colpoperineorrhaphy, pubovaginal sling (mini-Arc) placement, cystoscopy and suprapubic catheter palcement     HYSTERECTOMY, PAP NO LONGER INDICATED       HYSTEROSCOPY  2/2007    D&C     TUBAL LIGATION  1989     ZZC APPENDECTOMY       ZZC NARANJO W/O FACETEC FORAMOT/DSKC 1/2 VRT SEG, LUMBAR      L5-S1     ZZC STRESS ECHO (TREADMILL) FL  4/2006     Current Outpatient Medications   Medication Sig Dispense Refill     clobetasol (TEMOVATE) 0.05 % ointment        fluocinolone (SYNALAR) 0.01 % external solution        omeprazole (PRILOSEC) 20 MG DR capsule Take 1 capsule (20 mg) by mouth 2 times daily 30 capsule 0     SUMAtriptan (IMITREX) 100 MG tablet TAKE ONE TABLET BY MOUTH AT ONSET OF  "HEADACHE FOR MIGRAINE, MAY REPEAT DOSE AFTER 2 HOURS IF NEEDED. DO NOT TAKE MORE THAN 200MG IN 24 HOURS. 9 tablet 0     tretinoin (RETIN-A) 0.025 % cream        PREMARIN vaginal cream  (Patient not taking: Reported on 8/2/2022)         Allergies   Allergen Reactions     Dust Mite Extract      Grass         Social History     Tobacco Use     Smoking status: Never Smoker     Smokeless tobacco: Never Used   Substance Use Topics     Alcohol use: No     Comment: 1 drinks per week     Family History   Problem Relation Age of Onset     C.A.D. Father      Neurologic Disorder Father         parkinsons     Hypertension Father      Hyperlipidemia Father      Depression Father         Parkinsons related     Heart Disease Mother      Breast Cancer Mother         age 70 dx     Hypertension Sister      Cancer - colorectal Maternal Aunt      Cancer - colorectal Maternal Uncle      Cancer Paternal Uncle         bone     Colon Cancer Other         Maternal Uncle     Colon Cancer Other         Maternal Aunt     History   Drug Use No         Objective     /85   Pulse 90   Temp 98.3  F (36.8  C) (Oral)   Ht 1.676 m (5' 6\")   Wt 84.8 kg (187 lb)   LMP 11/30/2004   SpO2 98%   BMI 30.18 kg/m      Physical Exam    GENERAL APPEARANCE: healthy, alert and no distress     EYES: EOMI, PERRL     HENT: ear canals and TM's normal and nose and mouth without ulcers or lesions     NECK: no adenopathy, no asymmetry, masses, or scars and thyroid normal to palpation     RESP: lungs clear to auscultation - no rales, rhonchi or wheezes     CV: regular rates and rhythm, normal S1 S2, no S3 or S4 and no murmur, click or rub     ABDOMEN:  soft, nontender, no HSM or masses and bowel sounds normal     MS: extremities normal- no gross deformities noted, no evidence of inflammation in joints, FROM in all extremities.     SKIN: no suspicious lesions or rashes     NEURO: Normal strength and tone, sensory exam grossly normal, mentation intact and speech " normal     PSYCH: mentation appears normal. and affect normal/bright     LYMPHATICS: No cervical adenopathy    No results for input(s): HGB, PLT, INR, NA, POTASSIUM, CR, A1C in the last 24293 hours.     Diagnostics:  Labs pending at this time.  Results will be reviewed when available.   No EKG required, no history of coronary heart disease, significant arrhythmia, peripheral arterial disease or other structural heart disease.    Revised Cardiac Risk Index (RCRI):  The patient has the following serious cardiovascular risks for perioperative complications:   - No serious cardiac risks = 0 points     RCRI Interpretation: 0 points: Class I (very low risk - 0.4% complication rate)           Signed Electronically by: DORIS Smith, CNP  Copy of this evaluation report is provided to requesting physician.

## 2022-08-03 ENCOUNTER — TELEPHONE (OUTPATIENT)
Dept: GASTROENTEROLOGY | Facility: CLINIC | Age: 67
End: 2022-08-03

## 2022-08-03 ENCOUNTER — HOSPITAL ENCOUNTER (OUTPATIENT)
Facility: AMBULATORY SURGERY CENTER | Age: 67
End: 2022-08-03
Attending: STUDENT IN AN ORGANIZED HEALTH CARE EDUCATION/TRAINING PROGRAM
Payer: COMMERCIAL

## 2022-08-03 NOTE — TELEPHONE ENCOUNTER
Screening Questions    BlueKIND OF PREP RedLOCATION [review exclusion criteria] GreenSEDATION TYPE    Have you had a positive covid test in the last 90 days? N  If yes, what date?     Do you have a legal guardian or medical Power of ?  Are you able to give consent for your medical care?Y (Sedation review/consideration needed)    1. Are you active on mychart? Y    2. What insurance is in the chart? BCBS MEDICARE     3.   Ordering/Referring Provider: HANNAH KUHN    4. BMI 30.2 [BMI OVER 40-EXTENDED PREP]  If greater than 40 review exclusion criteria [PAC APPT IF (MAC) @ [facility]]      5.  Respiratory Screening:  [If yes to any of the following HOSPITAL setting only]     Do you use daily home oxygen? N    Do you have mod to severe Obstructive Sleep Apnea? N   [OKAY @ [facility] SH PH RI]   Do you have Pulmonary Hypertension? N     Do you have UNCONTROLLED asthma? N        6.   Have you had a heart or lung transplant? N      7.   Are you currently on dialysis? N [ If yes, G-PREP & HOSPITAL setting only]     8.   Do you have chronic kidney disease? N [ If yes, G-PREP ]    9.   Have you had a stroke or Transient ischemic attack (TIA - aka  mini stroke ) within 6 months?  N (If yes, please review exclusion criteria)         In the past 6 months, have you had any heart related issues including cardiomyopathy or heart attack? N           If yes, did it require cardiac stenting or other implantable device? N      10   Do you have any implantable devices in your body (pacemaker, defib, LVAD)? N (If yes, please review exclusion criteria)    11.   Do you take nitroglycerin? N            If yes, how often? N  (if yes, HOSPITAL setting ONLY)    12.   Are you currently taking any blood thinners? N           [IF YES, INFORM PATIENT TO FOLLOW UP W/ ORDERING PROVIDER FOR BRIDGING INSTRUCTIONS]     13.  22.  Do you take Phentermine? N     Yes-> Hold for 7 days before procedure.  Please consult your prescribing provider if  you have questions about holding this medication.    14.   Do you have a diagnosis of diabetes? N   [ If yes, G-PREP ]    15.   [FEMALES] Are you currently pregnant? N    If yes, how many weeks? N    16.   Are you taking any prescription pain medications on a routine schedule?  N  [ If yes, EXTENDED PREP.] [If yes, MAC]    17.   Do you have any chemical dependencies such as alcohol, street drugs, or methadone?  N [If yes, MAC]    18.   Do you have any history of post-traumatic stress syndrome, severe anxiety or history of psychosis?  N  [If yes, MAC]    19.   Do you transfer independently? (If NO, please HOSPITAL setting  only)  Y    20.  On a regular basis do you go 3-5 days between bowel movements? N   [ If yes, EXTENDED PREP.]    21.   Preferred LOCAL Pharmacy for Pre Prescription:                              [facility] [address] [place] [address]    Scheduling Details      Caller:   (Please ask for phone number if not scheduled by patient)    Type of Procedure Scheduled: Lower Endoscopy [Colonoscopy]    Which Colonoscopy Prep was Sent?: LUKAS DOZIER  PATIENTS & BERNY'S PATIENTS NEEDS EXTENDED PREP    Surgeon: LORRI  Date of Procedure: 9/29  Location: [facility]    Sedation Type: CS  ( ORDER SAYS NO SEDATION PT WANTS SEDATION)  Conscious Sedation- Needs  for 6 hours after the procedure  MAC/General-Needs  for 24 hours after procedure    Pre-op Required at [facility] [place], [place] and OR for MAC sedation:  N  (advise patient they will need a pre-op WITH IN 30 DAYS prior to procedure -)      Informed patient they will need an adult  Y  Cannot take any type of public or medical transportation alone    Pre-Procedure Covid test to be completed at [facility] or Externally: HOME    Confirmed Nurse will call to complete assessment Y    Additional comments: =

## 2022-08-04 ENCOUNTER — ANESTHESIA EVENT (OUTPATIENT)
Dept: SURGERY | Facility: AMBULATORY SURGERY CENTER | Age: 67
End: 2022-08-04
Payer: COMMERCIAL

## 2022-08-05 ENCOUNTER — HOSPITAL ENCOUNTER (OUTPATIENT)
Facility: AMBULATORY SURGERY CENTER | Age: 67
Discharge: HOME | End: 2022-08-05
Attending: STUDENT IN AN ORGANIZED HEALTH CARE EDUCATION/TRAINING PROGRAM | Admitting: STUDENT IN AN ORGANIZED HEALTH CARE EDUCATION/TRAINING PROGRAM
Payer: COMMERCIAL

## 2022-08-05 ENCOUNTER — ANESTHESIA (OUTPATIENT)
Dept: SURGERY | Facility: AMBULATORY SURGERY CENTER | Age: 67
End: 2022-08-05
Payer: COMMERCIAL

## 2022-08-05 VITALS
HEART RATE: 68 BPM | WEIGHT: 182 LBS | TEMPERATURE: 96.9 F | RESPIRATION RATE: 18 BRPM | BODY MASS INDEX: 29.25 KG/M2 | SYSTOLIC BLOOD PRESSURE: 134 MMHG | DIASTOLIC BLOOD PRESSURE: 85 MMHG | HEIGHT: 66 IN | OXYGEN SATURATION: 97 %

## 2022-08-05 DIAGNOSIS — S66.811A EXTENSOR TENDON RUPTURE OF HAND, RIGHT, INITIAL ENCOUNTER: ICD-10-CM

## 2022-08-05 PROCEDURE — 26480 TRANSPLANT HAND TENDON: CPT | Mod: RT | Performed by: STUDENT IN AN ORGANIZED HEALTH CARE EDUCATION/TRAINING PROGRAM

## 2022-08-05 PROCEDURE — 26480 TRANSPLANT HAND TENDON: CPT | Mod: RT

## 2022-08-05 RX ORDER — NALOXONE HYDROCHLORIDE 0.4 MG/ML
0.2 INJECTION, SOLUTION INTRAMUSCULAR; INTRAVENOUS; SUBCUTANEOUS
Status: DISCONTINUED | OUTPATIENT
Start: 2022-08-05 | End: 2022-08-06 | Stop reason: HOSPADM

## 2022-08-05 RX ORDER — FENTANYL CITRATE 50 UG/ML
25 INJECTION, SOLUTION INTRAMUSCULAR; INTRAVENOUS EVERY 5 MIN PRN
Status: DISCONTINUED | OUTPATIENT
Start: 2022-08-05 | End: 2022-08-06 | Stop reason: HOSPADM

## 2022-08-05 RX ORDER — OXYCODONE HYDROCHLORIDE 5 MG/1
5 TABLET ORAL EVERY 6 HOURS PRN
Qty: 10 TABLET | Refills: 0 | Status: SHIPPED | OUTPATIENT
Start: 2022-08-05 | End: 2022-08-08

## 2022-08-05 RX ORDER — FLUMAZENIL 0.1 MG/ML
0.2 INJECTION, SOLUTION INTRAVENOUS
Status: DISCONTINUED | OUTPATIENT
Start: 2022-08-05 | End: 2022-08-06 | Stop reason: HOSPADM

## 2022-08-05 RX ORDER — NALOXONE HYDROCHLORIDE 0.4 MG/ML
0.4 INJECTION, SOLUTION INTRAMUSCULAR; INTRAVENOUS; SUBCUTANEOUS
Status: DISCONTINUED | OUTPATIENT
Start: 2022-08-05 | End: 2022-08-06 | Stop reason: HOSPADM

## 2022-08-05 RX ORDER — PROPOFOL 10 MG/ML
INJECTION, EMULSION INTRAVENOUS PRN
Status: DISCONTINUED | OUTPATIENT
Start: 2022-08-05 | End: 2022-08-05

## 2022-08-05 RX ORDER — HALOPERIDOL 5 MG/ML
1 INJECTION INTRAMUSCULAR
Status: DISCONTINUED | OUTPATIENT
Start: 2022-08-05 | End: 2022-08-06 | Stop reason: HOSPADM

## 2022-08-05 RX ORDER — CEFAZOLIN SODIUM 2 G/50ML
2 SOLUTION INTRAVENOUS SEE ADMIN INSTRUCTIONS
Status: DISCONTINUED | OUTPATIENT
Start: 2022-08-05 | End: 2022-08-06 | Stop reason: HOSPADM

## 2022-08-05 RX ORDER — CEFAZOLIN SODIUM 2 G/50ML
2 SOLUTION INTRAVENOUS
Status: DISCONTINUED | OUTPATIENT
Start: 2022-08-05 | End: 2022-08-06 | Stop reason: HOSPADM

## 2022-08-05 RX ORDER — ONDANSETRON 2 MG/ML
4 INJECTION INTRAMUSCULAR; INTRAVENOUS EVERY 30 MIN PRN
Status: DISCONTINUED | OUTPATIENT
Start: 2022-08-05 | End: 2022-08-06 | Stop reason: HOSPADM

## 2022-08-05 RX ORDER — LIDOCAINE HYDROCHLORIDE 20 MG/ML
INJECTION, SOLUTION INFILTRATION; PERINEURAL PRN
Status: DISCONTINUED | OUTPATIENT
Start: 2022-08-05 | End: 2022-08-05

## 2022-08-05 RX ORDER — ONDANSETRON 2 MG/ML
INJECTION INTRAMUSCULAR; INTRAVENOUS PRN
Status: DISCONTINUED | OUTPATIENT
Start: 2022-08-05 | End: 2022-08-05

## 2022-08-05 RX ORDER — FENTANYL CITRATE 50 UG/ML
INJECTION, SOLUTION INTRAMUSCULAR; INTRAVENOUS PRN
Status: DISCONTINUED | OUTPATIENT
Start: 2022-08-05 | End: 2022-08-05

## 2022-08-05 RX ORDER — FENTANYL CITRATE 50 UG/ML
25-50 INJECTION, SOLUTION INTRAMUSCULAR; INTRAVENOUS
Status: DISCONTINUED | OUTPATIENT
Start: 2022-08-05 | End: 2022-08-06 | Stop reason: HOSPADM

## 2022-08-05 RX ORDER — SODIUM CHLORIDE, SODIUM LACTATE, POTASSIUM CHLORIDE, CALCIUM CHLORIDE 600; 310; 30; 20 MG/100ML; MG/100ML; MG/100ML; MG/100ML
INJECTION, SOLUTION INTRAVENOUS CONTINUOUS PRN
Status: DISCONTINUED | OUTPATIENT
Start: 2022-08-05 | End: 2022-08-05

## 2022-08-05 RX ORDER — ALBUTEROL SULFATE 0.83 MG/ML
2.5 SOLUTION RESPIRATORY (INHALATION) EVERY 4 HOURS PRN
Status: DISCONTINUED | OUTPATIENT
Start: 2022-08-05 | End: 2022-08-06 | Stop reason: HOSPADM

## 2022-08-05 RX ORDER — MEPERIDINE HYDROCHLORIDE 25 MG/ML
12.5 INJECTION INTRAMUSCULAR; INTRAVENOUS; SUBCUTANEOUS
Status: DISCONTINUED | OUTPATIENT
Start: 2022-08-05 | End: 2022-08-06 | Stop reason: HOSPADM

## 2022-08-05 RX ORDER — LIDOCAINE 40 MG/G
CREAM TOPICAL
Status: DISCONTINUED | OUTPATIENT
Start: 2022-08-05 | End: 2022-08-06 | Stop reason: HOSPADM

## 2022-08-05 RX ORDER — HYDROMORPHONE HYDROCHLORIDE 1 MG/ML
0.2 INJECTION, SOLUTION INTRAMUSCULAR; INTRAVENOUS; SUBCUTANEOUS EVERY 10 MIN PRN
Status: DISCONTINUED | OUTPATIENT
Start: 2022-08-05 | End: 2022-08-06 | Stop reason: HOSPADM

## 2022-08-05 RX ORDER — SODIUM CHLORIDE, SODIUM LACTATE, POTASSIUM CHLORIDE, CALCIUM CHLORIDE 600; 310; 30; 20 MG/100ML; MG/100ML; MG/100ML; MG/100ML
INJECTION, SOLUTION INTRAVENOUS CONTINUOUS
Status: DISCONTINUED | OUTPATIENT
Start: 2022-08-05 | End: 2022-08-06 | Stop reason: HOSPADM

## 2022-08-05 RX ORDER — OXYCODONE HYDROCHLORIDE 5 MG/1
5 TABLET ORAL EVERY 4 HOURS PRN
Status: DISCONTINUED | OUTPATIENT
Start: 2022-08-05 | End: 2022-08-06 | Stop reason: HOSPADM

## 2022-08-05 RX ORDER — ACETAMINOPHEN 325 MG/1
975 TABLET ORAL ONCE
Status: COMPLETED | OUTPATIENT
Start: 2022-08-05 | End: 2022-08-05

## 2022-08-05 RX ORDER — PROPOFOL 10 MG/ML
INJECTION, EMULSION INTRAVENOUS CONTINUOUS PRN
Status: DISCONTINUED | OUTPATIENT
Start: 2022-08-05 | End: 2022-08-05

## 2022-08-05 RX ORDER — ONDANSETRON 4 MG/1
4 TABLET, ORALLY DISINTEGRATING ORAL EVERY 30 MIN PRN
Status: DISCONTINUED | OUTPATIENT
Start: 2022-08-05 | End: 2022-08-06 | Stop reason: HOSPADM

## 2022-08-05 RX ORDER — GABAPENTIN 100 MG/1
100 CAPSULE ORAL
Status: COMPLETED | OUTPATIENT
Start: 2022-08-05 | End: 2022-08-05

## 2022-08-05 RX ORDER — BUPIVACAINE HYDROCHLORIDE 5 MG/ML
INJECTION, SOLUTION EPIDURAL; INTRACAUDAL
Status: COMPLETED | OUTPATIENT
Start: 2022-08-05 | End: 2022-08-05

## 2022-08-05 RX ADMIN — LIDOCAINE HYDROCHLORIDE 60 MG: 20 INJECTION, SOLUTION INFILTRATION; PERINEURAL at 10:16

## 2022-08-05 RX ADMIN — PROPOFOL 50 MCG/KG/MIN: 10 INJECTION, EMULSION INTRAVENOUS at 10:21

## 2022-08-05 RX ADMIN — CEFAZOLIN SODIUM 2 G: 2 SOLUTION INTRAVENOUS at 10:08

## 2022-08-05 RX ADMIN — FENTANYL CITRATE 50 MCG: 50 INJECTION, SOLUTION INTRAMUSCULAR; INTRAVENOUS at 10:16

## 2022-08-05 RX ADMIN — SODIUM CHLORIDE, SODIUM LACTATE, POTASSIUM CHLORIDE, CALCIUM CHLORIDE: 600; 310; 30; 20 INJECTION, SOLUTION INTRAVENOUS at 08:49

## 2022-08-05 RX ADMIN — ONDANSETRON 4 MG: 2 INJECTION INTRAMUSCULAR; INTRAVENOUS at 10:25

## 2022-08-05 RX ADMIN — ACETAMINOPHEN 975 MG: 325 TABLET ORAL at 08:47

## 2022-08-05 RX ADMIN — BUPIVACAINE HYDROCHLORIDE 15 ML: 5 INJECTION, SOLUTION EPIDURAL; INTRACAUDAL at 09:15

## 2022-08-05 RX ADMIN — PROPOFOL 40 MG: 10 INJECTION, EMULSION INTRAVENOUS at 10:17

## 2022-08-05 RX ADMIN — GABAPENTIN 100 MG: 100 CAPSULE ORAL at 08:47

## 2022-08-05 RX ADMIN — SODIUM CHLORIDE, SODIUM LACTATE, POTASSIUM CHLORIDE, CALCIUM CHLORIDE: 600; 310; 30; 20 INJECTION, SOLUTION INTRAVENOUS at 09:26

## 2022-08-05 NOTE — ANESTHESIA PROCEDURE NOTES
Brachial plexus Procedure Note    Pre-Procedure   Staff -        Anesthesiologist:  Aric Flores MD       Performed By: anesthesiologist       Location: pre-op       Procedure Start/Stop Times: 8/5/2022 9:15 AM and 8/5/2022 9:25 AM       Pre-Anesthestic Checklist: patient identified, IV checked, site marked, risks and benefits discussed, informed consent, monitors and equipment checked, pre-op evaluation, at physician/surgeon's request and post-op pain management  Timeout:       Correct Patient: Yes        Correct Procedure: Yes        Correct Site: Yes        Correct Position: Yes        Correct Laterality: Yes        Site Marked: Yes  Procedure Documentation  Procedure: Brachial plexus       Laterality: right       Patient Position: supine (supraclavicular approach).       Needle Type: insulated and short bevel       Needle Gauge: 21.        Needle Length (Inches): 4        Ultrasound guided       1. Ultrasound was used to identify targeted nerve, plexus, vascular marker, or fascial plane and place a needle adjacent to it in real-time.       2. Ultrasound was used to visualize the spread of anesthetic in close proximity to the above referenced structure.       3. A permanent image is entered into the patient's record.       4. The visualized anatomic structures appeared normal.       5. There were no apparent abnormal pathologic findings.    Assessment/Narrative         The placement was negative for: blood aspirated, painful injection and site bleeding       Paresthesias: No.       Bolus given via needle..        Secured via.        Insertion/Infusion Method: Single Shot       Complications: none    Medication(s) Administered   Bupivacaine 0.5% PF (Infiltration) - Infiltration   15 mL - 8/5/2022 9:15:00 AM  Mepivacaine 2% PF (Perineural) - Perineural   10 mL - 8/5/2022 9:15:00 AM  Medication Administration Time: 8/5/2022 9:15 AM

## 2022-08-05 NOTE — OR NURSING
Patient received right side Brachial Plexus nerve block  without Exparel.  Versed 1.5mg given. Tolerated procedure well.

## 2022-08-05 NOTE — DISCHARGE INSTRUCTIONS
Procedure Performed: Right Extensor Tendon Transfer  Attending Surgeon: Geraldo Fischer MD  Date: 8/5/2022    DIAGNOSIS  1. Extensor tendon rupture of hand, right, initial encounter        MEDICATIONS   Resume all home medications as directed unless otherwise instructed during this hospitalization. If there is any question, double check with your primary care provider.  Start new discharge medications as directed.    Take 1 tablet of 650 mg Tylenol (acetaminophen) Arthritis Strength (extended release) and 1 tablet of Aleve (naproxen) 220 mg in the morning with breakfast and in the evening with dinner.     For breakthrough pain use narcotic pain medication as prescribed.    Do not drive or operate machinery while taking narcotic pain medications.   If you are taking other Tylenol containing medicines at home, be sure NOT to exceed 4 gram's (4000 milligrams) of Tylenol per day.   If you are taking pain medications, be sure to take Colace (docusate sodium) as well to prevent constipation. If constipated, try adding another cathartic or enema.  If nausea and vomiting, call the hospital or seek medical attention.    ACTIVITY   Weight bearing: Non-weight bearing to arm.    DIET  Resume same diet prior to your hospital admission.    WOUND   Leave dressing on until you are seen in clinic for your follow up visit.   Watch for signs and symptoms of infection of your wounds including; pain, redness, swelling, drainage or fever.  If you notice any of these symptoms please call or seek medical attention.    Keep wound clean, dry, and intact.  Do not submerge wounds in water until they are healed. No baths, soaking, swimming, or prolonged water exposure for 4 weeks after surgery.    RETURN   Follow-up with Orthopedic Clinic as directed.     Future Appointments   Date Time Provider Department [place]   8/15/2022 10:00 AM Geraldo Fischer MD [facility]       Call the [facility] at [phone] during business hours for  any symptoms such as:    * Fevers with Temperature greater than 101.5 degrees.   * Pus drainage from wound site.   * Severe pain, not controlled by medication.   * Persistent nausea, vomiting and inablility to tolerate fluids.    If you are receiving care in [place], you may call the Orthopedic clinic at [phone] Option #2.    FOR URGENT PROBLEMS ONLY, after hours or on weekends call the hospital  at [phone] and ask to speak with the orthopedic resident on call.    You may also be seen at our Orthopedic Walk-In clinic that runs 7 days per week from 7a-5p at [address] [place] [id number]. You can call the Walk-In Clinic at [phone].    [facility]  Home Care Following Anesthesia  For 24 hours after surgery:  Get plenty of rest.  A responsible adult must stay with you for at least 24 hours after you leave the surgery center.  Do not drive or use heavy equipment.  If you have weakness or tingling, don't drive or use heavy equipment until this feeling goes away.   Do not drink alcohol.   Avoid strenuous or risky activities.  Ask for help when climbing stairs.  You may feel lightheaded.  IF so, sit for a few minutes before standing.  Have someone help you get up.   If you have nausea (feel sick to your stomach): Drink only clear liquids such as apple juice, ginger ale, broth or 7-Up.  Rest may also help.  Be sure to drink enough fluids.  Move to a regular diet as you feel able.   You may have a slight fever.  Call the doctor if your fever is over 100 F (37.7 C) (taken under the tongue) or lasts longer than 24 hours.  You may have a dry mouth, a sore throat, muscle aches or trouble sleeping. These should go away after 24 hours.  Do not make important or legal decisions.   It is recommended to avoid smoking.        Today you received a Marcaine or bupivacaine block to numb the nerves near your surgery site.  This is a block using local anesthetic or  "\"numbing\" medication injected around the nerves to anesthetize or \"numb\" the area supplied by those nerves.  This block is injected into the muscle layer near your surgical site.  The medication may numb the location where you had surgery for 6-18 hours, but may last up to 24 hours.  If your surgical site is an arm or leg you should be careful with your affected limb, since it is possible to injure your limb without being aware of it due to the numbing.  Until full feeling returns, you should guard against bumping or hitting your limb, and avoid extreme hot or cold temperatures on the skin.  As the block wears off, the feeling will return as a tingling or prickly sensation near your surgical site.  You will experience more discomfort from your incision as the feeling returns.  You may want to take a pain pill (a narcotic or Tylenol if this was prescribed by your surgeon) when you start to experience mild pain before the pain beccomes more severe.  If your pain medications do not control your pain you should notifiy your surgeon.    Tips for taking pain medications  To get the best pain relief possible, remember these points:  Take pain medications as directed, before pain becomes severe.  Pain medication can upset your stomach: taking it with food may help.  Constipation is a common side effect of pain medication. Drink plenty of  fluids.  Eat foods high in fiber. Take a stool softener if recommended by your doctor or pharmacist.  Do not drink alcohol, drive or operate machinery while taking pain medications.  Ask about other ways to control pain, such as with heat, ice or relaxation.    Tylenol/Acetaminophen Consumption  To help encourage the safe use of acetaminophen, the makers of TYLENOL  have lowered the maximum daily dose for single-ingredient Extra Strength TYLENOL  (acetaminophen) products sold in the [place] from 8 pills per day (4,000 mg) to 6 pills per day (3,000 mg). The dosing interval has also changed " from 2 pills every 4-6 hours to 2 pills every 6 hours.  If you feel your pain relief is insufficient, you may take Tylenol/Acetaminophen in addition to your narcotic pain medication.   Be careful not to exceed 3,000 mg of Tylenol/Acetaminophen in a 24 hour period from all sources.  If you are taking extra strength Tylenol/acetaminophen (500 mg), the maximum dose is 6 tablets in 24 hours.  If you are taking regular strength acetaminophen (325 mg), the maximum dose is 9 tablets in 24 hours.    **975 mg Tylenol given at 8:45, can take again at 2:45 PM    Call a doctor for any of the following:  Signs of infection (fever, growing tenderness at the surgery site, a large amount of drainage or bleeding, severe pain, foul-smelling drainage, redness, swelling).  It has been over 8 to 10 hours since surgery and you are still not able to urinate (pass water).  Headache for over 24 hours.  Numbness, tingling or weakness the day after surgery (if you had spinal anesthesia).  Signs of Covid-19 infection (temperature over 100 degrees, shortness of breath, cough, loss of taste/smell, generalized body aches, persistent headache, chills, sore throat, nausea/vomiting/diarrhea)  Your doctor is:       Dr. Geraldo Fischer, Orthopaedics: [phone]               Or dial [phone] and ask for the resident on call for:  Orthopaedics  For emergency care, call the:  [facility] Emergency Department: [phone] (TTY for hearing impaired: [phone])

## 2022-08-05 NOTE — INTERVAL H&P NOTE
"I have reviewed the surgical (or preoperative) H&P that is linked to this encounter, and examined the patient. There are no significant changes    Clinical Conditions Present on Arrival:  Clinically Significant Risk Factors Present on Admission                   # Overweight: Estimated body mass index is 29.38 kg/m  as calculated from the following:    Height as of this encounter: 1.676 m (5' 6\").    Weight as of this encounter: 82.6 kg (182 lb).       "

## 2022-08-05 NOTE — ANESTHESIA POSTPROCEDURE EVALUATION
Patient: Carol Barkley    Procedure: Procedure(s):  RIGHT TENDON TRANSFER EXTENSOR INDICIS PROPRIUS TO EXTENSOR POLLICIS LONGUS       Anesthesia Type:  MAC    Note:  Disposition: Outpatient   Postop Pain Control: Uneventful            Sign Out: Well controlled pain   PONV: No   Neuro/Psych: Uneventful            Sign Out: Acceptable/Baseline neuro status   Airway/Respiratory: Uneventful            Sign Out: Acceptable/Baseline resp. status   CV/Hemodynamics: Uneventful            Sign Out: Acceptable CV status   Other NRE: NONE   DID A NON-ROUTINE EVENT OCCUR? No           Last vitals:  Vitals Value Taken Time   /85 08/05/22 1159   Temp 36.1  C (96.9  F) 08/05/22 1159   Pulse 68 08/05/22 1159   Resp 18 08/05/22 1159   SpO2 97 % 08/05/22 1159       Electronically Signed By: Liam Ortiz DO  August 5, 2022  3:09 PM

## 2022-08-05 NOTE — ANESTHESIA CARE TRANSFER NOTE
Patient: Carol Barkley    Procedure: Procedure(s):  RIGHT TENDON TRANSFER EXTENSOR INDICIS PROPRIUS TO EXTENSOR POLLICIS LONGUS       Diagnosis: Extensor tendon rupture of hand, right, initial encounter [S66.811A]  Diagnosis Additional Information: No value filed.    Anesthesia Type:   MAC     Note:    Oropharynx: oropharynx clear of all foreign objects and spontaneously breathing  Level of Consciousness: drowsy  Oxygen Supplementation: room air    Independent Airway: airway patency satisfactory and stable  Dentition: dentition unchanged  Vital Signs Stable: post-procedure vital signs reviewed and stable  Report to RN Given: handoff report given  Patient transferred to: Phase II    Handoff Report: Identifed the Patient, Identified the Reponsible Provider, Reviewed the pertinent medical history, Discussed the surgical course, Reviewed Intra-OP anesthesia mangement and issues during anesthesia, Set expectations for post-procedure period and Allowed opportunity for questions and acknowledgement of understanding      Vitals:  Vitals Value Taken Time   /8972    Temp 96.8    Pulse 72    Resp 16    SpO2 98        Electronically Signed By: DORIS Crawford CRNA  August 5, 2022  11:31 AM

## 2022-08-05 NOTE — ANESTHESIA PREPROCEDURE EVALUATION
Anesthesia Pre-Procedure Evaluation    Patient: Carol Barkley   MRN: [id number] : 1955        Procedure : Procedure(s):  RIGHT TENDON TRANSFER EXTENSOR INDICIS PROPRIUS TO EXTENSOR POLLICIS LONGUS          Past Medical History:   Diagnosis Date     Allergic rhinitis due to other allergen     Immunotherapy     CKD (chronic kidney disease) stage 2, GFR 60-89 ml/min      Cough     seen by Dr Freire and ENT, CXR NL     Cystourethrocele      Hearing loss      Lichen sclerosus et atrophicus of the vulva     Jama     Migraine without aura, without mention of intractable migraine without mention of status migrainosus      PAC (premature atrial contraction) 2009    Holter     Personal history of colonic polyps      Pure hypercholesterolemia      PVD (posterior vitreous detachment)      Rectocele      Unspecified sinusitis (chronic)      Urine, incontinence, stress female       Past Surgical History:   Procedure Laterality Date     BREAST BIOPSY, RT/LT       COLONOSCOPY  2014     COLONOSCOPY  2014    Procedure: COLONOSCOPY;  colonoscopy  ;  Surgeon: Jose Newton MD;  Location:  GI     HC REMOVAL GALLBLADDER      Cholecystectomy     HYSTERECTOMY VAGINAL  2011    w/ bilateral salphingo-oophorectomy, eterocele reapir w/ Jamie culdoplasty, anterior colporrhaphy, posterior colpoperineorrhaphy, pubovaginal sling (mini-Arc) placement, cystoscopy and suprapubic catheter palcement     HYSTERECTOMY, PAP NO LONGER INDICATED       HYSTEROSCOPY  2007    D&C     TUBAL LIGATION       ZZC APPENDECTOMY       ZZC NARANJO W/O FACETEC FORAMOT/DSKC  VRT SEG, LUMBAR      L5-S1     ZZC STRESS ECHO (TREADMILL) FL  2006      Allergies   Allergen Reactions     Dust Mite Extract      Grass       Social History     Tobacco Use     Smoking status: Never Smoker     Smokeless tobacco: Never Used   Substance Use Topics     Alcohol use: No     Comment: 1 drinks per week      Wt Readings from Last 1 Encounters:    08/05/22 82.6 kg (182 lb)        Anesthesia Evaluation   Pt has had prior anesthetic. Type: General.        ROS/MED HX  ENT/Pulmonary:  - neg pulmonary ROS     Neurologic:  - neg neurologic ROS     Cardiovascular:  - neg cardiovascular ROS     METS/Exercise Tolerance:     Hematologic:  - neg hematologic  ROS     Musculoskeletal:  - neg musculoskeletal ROS     GI/Hepatic:     (+) GERD, Asymptomatic on medication,     Renal/Genitourinary:     (+) renal disease, type: CRI,     Endo:  - neg endo ROS     Psychiatric/Substance Use:  - neg psychiatric ROS     Infectious Disease:  - neg infectious disease ROS     Malignancy:   (+) Malignancy,     Other:            Physical Exam    Airway  airway exam normal           Respiratory Devices and Support         Dental  no notable dental history         Cardiovascular   cardiovascular exam normal          Pulmonary   pulmonary exam normal                OUTSIDE LABS:  CBC:   Lab Results   Component Value Date    WBC 5.1 12/13/2016    WBC 5.2 12/04/2015    HGB 13.4 08/02/2022    HGB 13.5 12/13/2016    HCT 41.1 12/13/2016    HCT 40.7 12/04/2015     12/13/2016     12/04/2015     BMP:   Lab Results   Component Value Date     08/02/2022     11/09/2017    POTASSIUM 3.9 08/02/2022    POTASSIUM 3.9 11/09/2017    CHLORIDE 107 08/02/2022    CHLORIDE 107 11/09/2017    CO2 28 08/02/2022    CO2 26 11/09/2017    BUN 15 08/02/2022    BUN 15 12/06/2019    CR 0.77 08/02/2022    CR 0.80 12/06/2019     (H) 08/02/2022    GLC 79 11/09/2017     COAGS:   Lab Results   Component Value Date    INR 1.02 03/23/2013     POC:   Lab Results   Component Value Date     (H) 02/26/2007     HEPATIC:   Lab Results   Component Value Date    ALBUMIN 3.7 08/02/2022    PROTTOTAL 7.0 08/02/2022    ALT 26 08/02/2022    AST 20 08/02/2022    ALKPHOS 73 08/02/2022    BILITOTAL 0.6 08/02/2022     OTHER:   Lab Results   Component Value Date    RONY 9.0 08/02/2022    LIPASE 87  03/23/2013    TSH 1.72 11/08/2019    SED 10 10/31/2014       Anesthesia Plan    ASA Status:  3   NPO Status:  NPO Appropriate    Anesthesia Type: MAC.     - Reason for MAC: straight local not clinically adequate   Induction: Intravenous.   Maintenance: TIVA.        Consents    Anesthesia Plan(s) and associated risks, benefits, and realistic alternatives discussed. Questions answered and patient/representative(s) expressed understanding.     - Discussed: Risks, Benefits and Alternatives for BOTH SEDATION and the PROCEDURE were discussed     - Discussed with:  Patient         Postoperative Care    Pain management: Oral pain medications, Peripheral nerve block (Single Shot).   PONV prophylaxis: Ondansetron (or other 5HT-3), Dexamethasone or Solumedrol     Comments:                Aric Flores MD, MD

## 2022-08-06 NOTE — OP NOTE
Patient: Carol Barkley  : 1955  MRN: [id number]    DATE OF OPERATION: 2022      OPERATIVE REPORT       PREOPERATIVE DIAGNOSIS:  Right extensor pollicis longus rupture     POSTOPERATIVE DIAGNOSIS:  Right extensor pollicis longus rupture     PROCEDURE:  Right extensor indicis proprius to extensor pollicis longus transfer    SURGEON:  Geraldo Fischer MD     ASSISTANT(S):  BETO Franco's assistance was necessary as no qualified residents were available to assist with the case.    ANESTHESIA:  Regional + MAC    IMPLANTS:   None    ESTIMATED BLOOD LOSS:  5cc    DVT PROPHYLAXIS:  SCDs    TOURNIQUET TIME:  47 minutes    SPECIMENS REMOVED:  None    INTRAOPERATIVE FINDINGS:  Attritional rupture of the EPL at the level of Christopher's tubercle    COMPLICATIONS:  None    DISPOSITION:  Stable to PACU.     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 She was diagnosed with an extensor pollicis longus rupture with inability to extend the thumb approximately 2 months after the injury. This was confirmed on ultrasound. She was subsequently referred to me for consultation. The deficit was quite limiting to her activities of daily living as well as her ability to play the organ. Her wrist range of motion was quite good and her distal radius fracture had healed. I therefore recommended an EIP to EPL transfer.    Indications for surgery were discussed with the patient in detail. After discussing risks, benefits and alternatives to procedure, the patient elected to proceed with surgical intervention.     The patient understood that risks include, but are not limited to: Bleeding, infection, damage to surrounding structures (such as nerve, vessel or tendon), repair rupture, need for additional procedures, pain, stiffness, need for therapy, and anesthetic complications. The patient  expressed understanding and agreement and wished to proceed.     Consent was obtained for the procedure.     DESCRIPTION OF PROCEDURE IN DETAIL:  The patient was seen in the preoperative care unit. Patient identity, consent, procedure to be performed, and operative site were verified with the patient. The right upper extremity was marked. The anesthesia team provided a preoperative regional block.     The patient was brought to the operating room and placed supine on the operating room table. The right upper extremity was placed on an armboard. SCDs were placed on the bilateral lower extremities. A well-padded tourniquet was placed on the upper arm.     Sedation was administered by anesthesia.     The right upper extremity was prepped and draped in the usual sterile fashion. A timeout was performed confirming the correct patient, procedure, operative site, and antibiotics. All were in agreement.     The limb was exsanguinated with Esmarch and the tourniquet inflated to 250 mmHg. A 1 cm transverse incision was made over the index extensor tendons proximal to the metacarpophalangeal joint and extensor mcdowell. Blunt dissection was carried down through the subcutaneous tissues to the extensor tendons. The EIP was identified deep and ulnar to the EDC and tagged with umbilical tape. A 2.5 cm longitudinal incision was then made ulnar to Christopher's tubercle over the 3rd and 4th extensor compartments. Blunt dissection was carried down to the extensor retinaculum. We looked for branches of the dorsal radial sensory nerve but none were encountered. The proximal aspect of the extensor retinaculum was partially released and the EIP and it's muscle belly were identified and tagged with umbilical tape. Tension on the tendon here confirmed that the correct tendon distally had been identified. The EIP was then transected distally and pulled through the extensor retinaculum proximally. A 2.5 cm longitudinal incision was then made over  the EPL proximal to the thumb MCP joint. Blunt dissection was carried down to the tendon which was mobilized. We could not pull this tendon into the distal wound. As such a small incision was made in the extensor retinaculum over the third extensor compartment at Christopher's tubercle. We found a very thin and degenerated slip of the EPL (approximately 10% of it's normal thickness) that remained in the compartment and was tethering the tendon. This was released and the EPL was effectively pulled into the distal wound. The proximal end of the EPL tendon was then trimmed and freshened with a scalpel. A subcutaneous pathway was then developed from the thumb incision to the dorsal wrist incision with a hemostat. The EIP was then retrieved and presented into the distal wound over the thumb. Care was taken to pass this dorsal to the extensor retinaculum and other extensor tendons. The thumb IP joint and wrist were then held in extension and the EIP and EPL relationship appropriately tensioned. An incision was then made in the midsubstance of the EPL through which the EIP was passed through. 3-0 ethibond was used to secure this pass and the tension was set. The tenodesis of the thumb IP joint was inspected with wrist range of motion and noted to be appropriate and symmetric to her contralateral side. Appropriate IP joint flexion was noted with wrist extension. The EIP was then passed through the EPL two more times in a Pulvertaft weave and sutured with 3-0 ethibond. This provided a very stout repair. Wrist and IP joint tenodesis was again inspected and was noted to be appropriate. The superfluous ends of each tendon were then trimmed. Attention was then turned back to the dorsal wrist wound. The bony prominence that had overgrown Christopher's tubercle from the healed fracture was resected with a rongeur and smoothed. The wounds were then copiously irrigated. The tourniquet was deflated and hemostasis was assured. The skin of all  incision was then closed with interrupted horizontal mattress 4-0 nylon sutures. A sterile dressing was placed and the wrist and thumb were placed in a well-padded thumb spica splint maintaining 30 degrees of wrist extension and full IP joint extension.    All needle and sponge counts were correct at the end of the procedure. There were no complications.     The patient was awoken from anesthesia and taken to the postoperative recovery unit in stable condition.     I was present and scrubbed for the entire procedure.     POSTOPERATIVE PLAN:  The patient will be discharged home. She will be strict non-weight bearing. The splint will remain on until first follow-up. At the first postoperative visit, the sutures will be removed. She will then see hand therapy for a custom thumb spica orthosis maintaining wrist and thumb IP extension. This will remain in place for an additional 3 weeks for a total of 4 weeks of immobilization to allow tendon healing. She will then begin range of motion.    Geraldo Fischer MD     Hand, Upper Extremity & Microvascular Surgery  Department of Orthopedic Surgery  [facility]

## 2022-08-15 ENCOUNTER — OFFICE VISIT (OUTPATIENT)
Dept: ORTHOPEDICS | Facility: CLINIC | Age: 67
End: 2022-08-15

## 2022-08-15 DIAGNOSIS — Z98.890 POST-OPERATIVE STATE: ICD-10-CM

## 2022-08-15 DIAGNOSIS — S66.811A EXTENSOR TENDON RUPTURE OF HAND, RIGHT, INITIAL ENCOUNTER: Primary | ICD-10-CM

## 2022-08-15 PROCEDURE — 29085 APPL CAST HAND&LWR FOREARM: CPT | Mod: 58 | Performed by: STUDENT IN AN ORGANIZED HEALTH CARE EDUCATION/TRAINING PROGRAM

## 2022-08-15 PROCEDURE — 99024 POSTOP FOLLOW-UP VISIT: CPT | Performed by: STUDENT IN AN ORGANIZED HEALTH CARE EDUCATION/TRAINING PROGRAM

## 2022-08-15 NOTE — NURSING NOTE
Reason For Visit:   Chief Complaint   Patient presents with     RECHECK     Post op Right EIP to EPL tendon transfer DOS: 8/5/22.  Going well.       Primary MD: Andrew Nair  Ref. MD: Bella    Age: 67 year old    ?  No      LMP 11/30/2004       Pain Assessment  Patient Currently in Pain: Yes  0-10 Pain Scale: 3  Primary Pain Location: Wrist (right)  Alleviating Factors: NSAIDS    Hand Dominance Evaluation  Hand Dominance: Right          QuickDASH Assessment  QuickDASH Main 8/11/2022   1. Open a tight or new jar. Unable   2. Do heavy household chores (e.g., wash walls, floors) Severe difficulty   3. Carry a shopping bag or briefcase. No difficulty   4. Wash your back. Moderate difficulty   5. Use a knife to cut food. Unable   6. Recreational activities in which you take some force or impact through your arm, shoulder or hand (e.g., golf, hammering, tennis, etc.). Unable   7. During the past week, to what extent has your arm, shoulder or hand problem interfered with your normal social activities with family, friends, neighbours or groups? Moderately   8. During the past week, were you limited in your work or other regular daily activities as a result of your arm, shoulder or hand problem? Unable   9. Arm, shoulder or hand pain. Moderate   10.Tingling (pins and needles) in your arm,shoulder or hand. Mild   11. During the past week, how much difficulty have you had sleeping because of the pain in your arm, shoulder or hand? Moderate difficulty   Quickdash Ability Score 63.64          Current Outpatient Medications   Medication Sig Dispense Refill     clobetasol (TEMOVATE) 0.05 % ointment        fluocinolone (SYNALAR) 0.01 % external solution        omeprazole (PRILOSEC) 20 MG DR capsule Take 1 capsule (20 mg) by mouth 2 times daily 30 capsule 0     PREMARIN vaginal cream        SUMAtriptan (IMITREX) 100 MG tablet TAKE ONE TABLET BY MOUTH AT ONSET OF HEADACHE FOR MIGRAINE, MAY REPEAT DOSE AFTER 2 HOURS IF  NEEDED. DO NOT TAKE MORE THAN 200MG IN 24 HOURS. 9 tablet 0     tretinoin (RETIN-A) 0.025 % cream          Allergies   Allergen Reactions     Dust Mite Extract      Grass        RAJENDRA GUNDERSON, ATC

## 2022-08-15 NOTE — PROGRESS NOTES
Date of Service: Aug 15, 2022    Chief Complaint: Post operative follow up.     Date of Surgery: 8/5/22    Procedure Performed: Right extensor indicis proprius to extensor pollicis longus transfer     Interval events: Carol Barkley is a 67 year old female who presents today for a postoperative follow up.  Patient reports that she is been doing quite well.  Pain is been well controlled.  She has been using Tylenol arthritis and Aleve.  She states this last for about 6 hours, but then she has increased pain prior to that status.  Denies any numbness or tingling.    The past medical history was reviewed updated in the EMR. This includes medications, surgeries, social history, and review of systems.    Physical examination:  Well-developed, well-nourished and in no acute distress.  Alert and oriented to surroundings.  On examination of the  right hand, incisions are healing well with sutures in place.  There is no erythema, drainage, or dehiscence. Swelling is Mild. Sensation is intact in median, radial and ulnar nerve distributions. Patient can actively flex and extend all digits and thumb. Fingers are warm and well-perfused.  IP of the thumb held in extension.    Assessment: 67 year old female s/p right extensor indicis proprius to extensor pollicis longus transfer, progressing appropriately.     Plan:    - Sutures removed today.   - She will see hand therapy today for custom thumb spica orthosis maintaining wrist and thumb IP extension. This will remain in place at all times except hygeine for an additional 3 weeks for a total of 4 weeks of immobilization to allow tendon healing. At 4 weeks post op she will initiate ROM per extensor tendon protocol.   - Follow up in 8 weeks with Dr. Fischer for recheck.     Addendum: At time of dictation patient returned from hand therapy requesting she be placed in a thumb spica cast for protection rather than a removable brace.  A well-padded thumb spica cast was applied.  Patient stay  clean dry and intact.  Cast care is reviewed.  She should return as needed for cast change.  Follow-up at 4 weeks postop for hand therapy to initiate therapy.    HANNAH ACEVEDO PA-C  August 15, 2022 10:38 AM   Orthopaedic Surgery

## 2022-08-15 NOTE — PROGRESS NOTES
Relevant Diagnosis: EIP to EPL tendon transfer Right wrist    thumb spica application and care    Type of Cast applied: Thumb Spica   Cast/Splinting material used: Fiberglass    Person(s) involved in teaching:   Patient     Motivation Level:  Asks Questions: Yes  Eager to Learn: Yes  Cooperative: Yes  Receptive (willing/able to accept information): Yes     Patient demonstrates understanding of the following:   Reason for the appointment, diagnosis and treatment plan: Yes    Which situations necessitate calling provider and whom to contact: Yes     Teaching Concerns Addressed:   Comments: Patient understood how to care for cast     Proper use and care of thumb spica cast: Yes  Pain management techniques: Yes  Wound Care: NA  How and/when to access community resources: Yes     Cast was applied in standard Manner:  Yes  Cast fit well:  Yes  Patient reports cast to fit comfortably:  Yes    Instructional Materials Used/Given: n/a

## 2022-08-15 NOTE — LETTER
8/15/2022         RE: Carol Barkley  [address]  [place] [address]        Dear Colleague,    Thank you for referring your patient, Carol Barkley, to the [facility] [place]. Please see a copy of my visit note below.    Date of Service: Aug 15, 2022    Chief Complaint: Post operative follow up.     Date of Surgery: 8/5/22    Procedure Performed: Right extensor indicis proprius to extensor pollicis longus transfer     Interval events: Carol Barkley is a 67 year old female who presents today for a postoperative follow up.  Patient reports that she is been doing quite well.  Pain is been well controlled.  She has been using Tylenol arthritis and Aleve.  She states this last for about 6 hours, but then she has increased pain prior to that status.  Denies any numbness or tingling.    The past medical history was reviewed updated in the EMR. This includes medications, surgeries, social history, and review of systems.    Physical examination:  Well-developed, well-nourished and in no acute distress.  Alert and oriented to surroundings.  On examination of the  right hand, incisions are healing well with sutures in place.  There is no erythema, drainage, or dehiscence. Swelling is Mild. Sensation is intact in median, radial and ulnar nerve distributions. Patient can actively flex and extend all digits and thumb. Fingers are warm and well-perfused.  IP of the thumb held in extension.    Assessment: 67 year old female s/p right extensor indicis proprius to extensor pollicis longus transfer, progressing appropriately.     Plan:    - Sutures removed today.   - She will see hand therapy today for custom thumb spica orthosis maintaining wrist and thumb IP extension. This will remain in place at all times except hygeine for an additional 3 weeks for a total of 4 weeks of immobilization to allow tendon healing. At 4 weeks post op she will initiate ROM per extensor tendon protocol.   - Follow  up in 8 weeks with Dr. Fischer for recheck.     Addendum: At time of dictation patient returned from hand therapy requesting she be placed in a thumb spica cast for protection rather than a removable brace.  A well-padded thumb spica cast was applied.  Patient stay clean dry and intact.  Cast care is reviewed.  She should return as needed for cast change.  Follow-up at 4 weeks postop for hand therapy to initiate therapy.    HANNAH ACEVEDO PA-C  August 15, 2022 10:38 AM   Orthopaedic Surgery      Attestation signed by Geraldo Fischer MD at 8/17/2022  9:45 PM:  I saw and evaluated the patient and agree with the findings and plan of care as documented in the note.    S/P EIP to EPL transfer 8/5. Doing well. Sutures removed. Incisions healing well. Patient preferred a short arm thumb spica cast with wrist extension and thumb IP extension due to grandkids at home. She will return at the 4 week postop laura for cast removal and initiation of hand therapy.    She will follow-up with me in 8 weeks to evaluate progress.    Geraldo Fischer MD    Hand, Upper Extremity & Microvascular Surgery  Department of Orthopedic Surgery  [facility]         Relevant Diagnosis: EIP to EPL tendon transfer Right wrist    thumb spica application and care    Type of Cast applied: Thumb Spica   Cast/Splinting material used: Fiberglass    Person(s) involved in teaching:   Patient     Motivation Level:  Asks Questions: Yes  Eager to Learn: Yes  Cooperative: Yes  Receptive (willing/able to accept information): Yes     Patient demonstrates understanding of the following:   Reason for the appointment, diagnosis and treatment plan: Yes    Which situations necessitate calling provider and whom to contact: Yes     Teaching Concerns Addressed:   Comments: Patient understood how to care for cast     Proper use and care of thumb spica cast: Yes  Pain management techniques: Yes  Wound Care: NA  How and/when to access community  resources: Yes     Cast was applied in standard Manner:  Yes  Cast fit well:  Yes  Patient reports cast to fit comfortably:  Yes    Instructional Materials Used/Given: n/a

## 2022-08-30 ENCOUNTER — ANCILLARY PROCEDURE (OUTPATIENT)
Dept: MAMMOGRAPHY | Facility: CLINIC | Age: 67
End: 2022-08-30
Attending: NURSE PRACTITIONER
Payer: COMMERCIAL

## 2022-08-30 DIAGNOSIS — Z12.31 VISIT FOR SCREENING MAMMOGRAM: ICD-10-CM

## 2022-08-30 PROCEDURE — 77063 BREAST TOMOSYNTHESIS BI: CPT | Mod: GC | Performed by: RADIOLOGY

## 2022-08-30 PROCEDURE — 77067 SCR MAMMO BI INCL CAD: CPT | Mod: GC | Performed by: RADIOLOGY

## 2022-08-30 NOTE — PROGRESS NOTES
Hand Therapy Initial Evaluation    Current Date:  9/2/2022    Diagnosis: Right EPL rupture  DOI: ~ongoing (8/15/2022 order date)  DOS: 8/5/2022  Procedure:  Right extensor indicis proprius to extensor pollicis longus transfer  Post:  4w 0d    Precautions: dorsal forearm-based orthosis with wrist in 20* extension, thumb midway RABD/PABD, MCP in full extension and IP in neutral/slight hyperextension    Subjective:  Carol Barkley is a 67 year old female.    Patient reports symptoms of the right thumb which occurred due to gradual onset after wrist fracture.    Past Medical History:   Diagnosis Date     Allergic rhinitis due to other allergen     Immunotherapy     CKD (chronic kidney disease) stage 2, GFR 60-89 ml/min      Cough     seen by Dr Freire and ENT, CXR NL     Cystourethrocele      Hearing loss      Lichen sclerosus et atrophicus of the vulva     Jama     Migraine without aura, without mention of intractable migraine without mention of status migrainosus      PAC (premature atrial contraction) 12/2009    Holter     Personal history of colonic polyps      Pure hypercholesterolemia      PVD (posterior vitreous detachment)      Rectocele      Unspecified sinusitis (chronic)      Urine, incontinence, stress female      Past Surgical History:   Procedure Laterality Date     BREAST BIOPSY, RT/LT       COLONOSCOPY  01/2014     COLONOSCOPY  1/8/2014    Procedure: COLONOSCOPY;  colonoscopy  ;  Surgeon: Jose Newton MD;  Location:  GI     HC REMOVAL GALLBLADDER      Cholecystectomy     HYSTERECTOMY VAGINAL  5/17/2011    w/ bilateral salphingo-oophorectomy, eterocele reapir w/ Wiley-Jimena culdoplasty, anterior colporrhaphy, posterior colpoperineorrhaphy, pubovaginal sling (mini-Arc) placement, cystoscopy and suprapubic catheter palcement     HYSTERECTOMY, PAP NO LONGER INDICATED       HYSTEROSCOPY  2/2007    D&C     TRANSFER, TENDON FOREARM, WRIST, HAND Right 8/5/2022    Procedure: RIGHT TENDON TRANSFER EXTENSOR  INDICIS PROPRIUS TO EXTENSOR POLLICIS LONGUS;  Surgeon: Geraldo Fischer MD;  Location: [facility] OR     TUBAL LIGATION  1989     ZZC APPENDECTOMY       ZZC NARANJO W/O FACETEC FORAMOT/DSKC 1/2 VRT SEG, LUMBAR      L5-S1     ZZC STRESS ECHO (TREADMILL) FL  4/2006     Current occupation:The patient has an organist part-time.    She also takes care of her 2-year-old and 3-month-old grandchildren a several days a week.  Job Tasks: Lifting, Carrying, Repetitive Tasks    Occupational Profile Information:  Right hand dominant  Prior functional level:  no limitations  Patient reports symptoms of stiffness/loss of motion and weakness/loss of strength  Special tests:  x-ray and US.    Previous treatment: surgery, hand therapy for DRF  Barriers include:none  Mobility: No difficulty  Transportation: drives  Currently not working due to present treatment problem  Leisure activities/hobbies: playing the organ,caretaking for grandchildren  Other: Pt reported falling on surgical hand when cast was changed/put on. MD notified prior to eval today. Performed time up and go test (TUG test) to which pt got 9.46 seconds, or within normal limits. Pt describes having previous issues (several years) with foot drop that she has to be more mindful of now.    Functional Outcome Measure:   Upper Extremity Functional Index Score:  SCORE:   Column Totals: /80: 18   (A lower score indicates greater disability.)    Objective:  Pain Level (Scale 0-10)   8/15/2022   At Rest 0/10   With Use NT     Pain Description  Date 8/15/2022   Location wrist, hand and thumb   Pain Quality Tight   Frequency intermittent     Pain is worst  daytime   Exacerbated by  bumping accidentally   Relieved by rest   Progression Unchanged     Edema (Circumference measured in cm)   8/15/2022 8/15/2022    L R   Index P1 6.9 7.5   PIP 6.5 8.2   P2 5.7 6.7   Thumb P1 6.9 8.7   IP 6.3 8.3     Sensation   NT    ROM  Pain Report: - none  + mild    ++ moderate    +++ severe   Wrist 9/2/2022  9/2/2022   AROM (PROM) L R   Extension 69 46   Flexion 46 8     ROM  Thumb 9/2/2022 9/2/2022   AROM  (PROM) L R   MP /67 0/0  /6 blocked   IP /74 HE/0  /4 blocked   RABD 47    PABD 51    Retropulsion NT    Kapandji Opposition Scale (0-10/10) NT      Strength  Deferred    Assessment:  Patient presents with symptoms consistent with diagnosis of right thumb EPL tendon rupture,  with surgical  intervention.     Patient's limitations or Problem List includes:  Pain, Decreased ROM/motion, Increased edema, Weakness and Tightness in musculature of the right wrist, hand and thumb which interferes with the patient's ability to perform Self Care Tasks (dressing, eating, bathing, hygiene/toileting), Work Tasks, Sleep Patterns, Recreational Activities, Household Chores and Driving  as compared to previous level of function.    Rehab Potential:  Excellent - Return to full activity, no limitations    Patient will benefit from skilled Occupational Therapy to increase ROM, overall strength, coordination and dexterity and decrease pain, edema and adherence of scarring to return to previous activity level and resume normal daily tasks and to reach their rehab potential.    Barriers to Learning:  No barrier    Communication Issues:  Patient appears to be able to clearly communicate and understand verbal and written communication and follow directions correctly.    Chart Review: Chart Review, Brief history including review of medical and/or therapy records relating to the presenting problem and Simple history review with patient    Identified Performance Deficits: bathing/showering, toileting, dressing, feeding, functional mobility, personal device care, hygiene and grooming, driving and community mobility, health management and maintenance, home establishment and management, meal preparation and cleanup, shopping, sleep, work and leisure activities    Assessment of Occupational Performance:  5 or more Performance Deficits    Clinical  Decision Making (Complexity): Moderate complexity    Treatment Explanation:  The following has been discussed with the patient:  RX ordered/plan of care  Anticipated outcomes  Possible risks and side effects    Plan:  Frequency:  1 X week, once daily  Duration:  for 8 weeks    Treatment Plan:   Modalities:  US, Fluidotherapy and Paraffin  Therapeutic Exercise:  AROM, AAROM, PROM, Tendon Gliding, Blocking, Reverse Blocking, Place and Hold, Contract Relax, Extensor Tracking, Isotonics, Isometrics and Stabilization   Neuromuscular re-education:  Nerve Gliding, Coordination/Dexterity, Sensory re-education, Desensitization, Kinesthetic Training, Proprioceptive Training, Posture, Kinesiotaping, Strain Counter Strain, Isometrics, Stabilization   Manual Techniques:  Coordination/Dexterity, Joint mobilization, Scar mobilization, Friction massage, Myofascial release, Manual edema mobilization   Orthotic Fabrication:  Forearm based  Self Care:  Self Care Tasks, Ergonomic Considerations and Work Tasks    Discharge Plan:  Achieve all LTG.  Independent in home treatment program.  Reach maximal therapeutic benefit.    Home Exercise Program:  Dorsal blocking orthosis - wrist in 20* extension, thumb between radial and palmar abduction, MP in extension, IP in neutral/hyperextension  Gentle Wrist AROM extension/flexion outside orthosis with wrist in neutral  Thumb MP and IP blocking within orthosis    Next Visit:  Review/progress HEP - thumb AROM flexion/extension, wrist RD/UD

## 2022-09-02 ENCOUNTER — THERAPY VISIT (OUTPATIENT)
Dept: OCCUPATIONAL THERAPY | Facility: CLINIC | Age: 67
End: 2022-09-02

## 2022-09-02 ENCOUNTER — OFFICE VISIT (OUTPATIENT)
Dept: ORTHOPEDICS | Facility: CLINIC | Age: 67
End: 2022-09-02
Payer: COMMERCIAL

## 2022-09-02 DIAGNOSIS — Z98.890 POST-OPERATIVE STATE: Primary | ICD-10-CM

## 2022-09-02 DIAGNOSIS — S66.811A EXTENSOR TENDON RUPTURE OF HAND, RIGHT, INITIAL ENCOUNTER: Primary | ICD-10-CM

## 2022-09-02 PROCEDURE — 97166 OT EVAL MOD COMPLEX 45 MIN: CPT | Mod: GO | Performed by: OCCUPATIONAL THERAPIST

## 2022-09-02 PROCEDURE — 99024 POSTOP FOLLOW-UP VISIT: CPT

## 2022-09-02 PROCEDURE — 97760 ORTHOTIC MGMT&TRAING 1ST ENC: CPT | Mod: GO | Performed by: OCCUPATIONAL THERAPIST

## 2022-09-02 NOTE — PROGRESS NOTES
[facility]    OUTPATIENT Occupational Therapy ORTHOPEDIC EVALUATION  PLAN OF TREATMENT FOR OUTPATIENT REHABILITATION  (COMPLETE FOR INITIAL CLAIMS ONLY)  Patient's Last Name, First Name, M.I.  YOB: 1955  Carol Barkley    Provider s Name:  [facility]   Medical Record No.  [id number]   Start of Care Date:  09/02/22   Onset Date:  08/05/2208/05/22   Type:     ___PT   _X__OT Medical Diagnosis:    Encounter Diagnosis   Name Primary?    Extensor tendon rupture of hand, right, initial encounter         Treatment Diagnosis:  EPL rupture        Goals:     09/02/22 0500   Goal #1   Goal #1 dressing   Previous Performance Level Independent   Current Functional Task Button   Current Performance Level Unable to don buttons d/t precautions   STG Target Performance Top button of shirt   STG Target Perform Level 10* thumb flexion   Due Date  09/30/22   LTG Target Task/Performance Other - on additional line   Other Dressing 40* thumb flexion   Due date 11/25/22       Therapy Frequency:  1x/week  Predicted Duration of Therapy Intervention:  12 weeks    Shakira Bernal OT                 I CERTIFY THE NEED FOR THESE SERVICES FURNISHED UNDER        THIS PLAN OF TREATMENT AND WHILE UNDER MY CARE     (Physician attestation of this document indicates review and certification of the therapy plan).                     Certification Date From:  09/02/22   Certification Date To:  11/25/22    Referring Provider:  Geraldo Fischer    Initial Assessment        See Epic Evaluation SOC Date: 09/02/22

## 2022-09-02 NOTE — PROGRESS NOTES
Cast removal:thumb spica right    Relevant Diagnosis: EIP to EPL tendon transfer Right wrist    Patient educated on cast removal process: Yes     Thumb spica cast was removed per physician instruction.    Skin was observed and found to be intact with no signs of concern:Yes     Concern noted: Index and middle finger swelling     Person(s) involved in removal:   Patient     Questions asked: Pt fell several weeks ago and noted swelling in fingers, asked to be sooner than currently schedled October appt    Patient sent to x-ray: MANOLO Booth

## 2022-09-07 ENCOUNTER — THERAPY VISIT (OUTPATIENT)
Dept: OCCUPATIONAL THERAPY | Facility: CLINIC | Age: 67
End: 2022-09-07
Payer: COMMERCIAL

## 2022-09-07 DIAGNOSIS — S52.501A CLOSED FRACTURE OF DISTAL END OF RIGHT RADIUS, UNSPECIFIED FRACTURE MORPHOLOGY, INITIAL ENCOUNTER: ICD-10-CM

## 2022-09-07 DIAGNOSIS — M25.531 RIGHT WRIST PAIN: ICD-10-CM

## 2022-09-07 DIAGNOSIS — S66.811A EXTENSOR TENDON RUPTURE OF HAND, RIGHT, INITIAL ENCOUNTER: Primary | ICD-10-CM

## 2022-09-07 PROCEDURE — 97763 ORTHC/PROSTC MGMT SBSQ ENC: CPT | Mod: GO | Performed by: OCCUPATIONAL THERAPIST

## 2022-09-07 PROCEDURE — 97110 THERAPEUTIC EXERCISES: CPT | Mod: GO | Performed by: OCCUPATIONAL THERAPIST

## 2022-09-07 NOTE — PROGRESS NOTES
SOAP note objective information for 9/7/2022.  Please refer to the daily flowsheet for treatment today, total treatment time and time spent performing 1:1 timed codes.       Diagnosis: Right EPL rupture  DOI: ~ongoing (8/15/2022 order date)  DOS: 8/5/2022  Procedure:  Right extensor indicis proprius to extensor pollicis longus transfer  Post:  4w 5d    Precautions: dorsal forearm-based orthosis with wrist in 20* extension, thumb midway RABD/PABD, MCP in full extension and IP in neutral/slight hyperextension      Objective:  Pain Level (Scale 0-10)   8/15/2022   At Rest 0/10   With Use NT     Pain Description  Date 8/15/2022   Location wrist, hand and thumb   Pain Quality Tight   Frequency intermittent     Pain is worst  daytime   Exacerbated by  bumping accidentally   Relieved by rest   Progression Unchanged     Edema (Circumference measured in cm)   8/15/2022 8/15/2022    L R   Index P1 6.9 7.5   PIP 6.5 8.2   P2 5.7 6.7   Thumb P1 6.9 8.7   IP 6.3 8.3     ROM  Pain Report: - none  + mild    ++ moderate    +++ severe   Wrist 9/2/2022 9/2/2022 9/7/2022   AROM (PROM) L R R   Extension 69 46 45   Flexion 46 8 29     ROM  Thumb 9/2/2022 9/2/2022 9/7/2022   AROM  (PROM) L R R   MP /67 0/0  /6 blocked 0/21   IP /74 HE/0  /4 blocked HE/20   RABD 47  NT   PABD 51  NT   Kapandji Opposition Scale (0-10/10) NT  NT     Strength  Deferred at this time        Home Exercise Program:  Dorsal blocking orthosis - wrist in 20* extension, thumb between radial and palmar abduction, MP in extension, IP in neutral/hyperextension  Gentle Wrist AROM extension/flexion outside orthosis with wrist in neutral  Thumb MP and IP blocking within orthosis  9/7/2022  Tenodesis exercise as warm-up  Wrist extension/flexion at edge of table  Thumb AROM  Picking up pom balls focusing on thumb IP flexion and activating transfer with release    Next Visit:  Add blocking exercises at 6 weeks post-op

## 2022-09-12 ENCOUNTER — OFFICE VISIT (OUTPATIENT)
Dept: ORTHOPEDICS | Facility: CLINIC | Age: 67
End: 2022-09-12
Payer: COMMERCIAL

## 2022-09-12 ENCOUNTER — THERAPY VISIT (OUTPATIENT)
Dept: OCCUPATIONAL THERAPY | Facility: CLINIC | Age: 67
End: 2022-09-12
Payer: COMMERCIAL

## 2022-09-12 DIAGNOSIS — Z98.890 POST-OPERATIVE STATE: Primary | ICD-10-CM

## 2022-09-12 DIAGNOSIS — S66.811A EXTENSOR TENDON RUPTURE OF HAND, RIGHT, INITIAL ENCOUNTER: Primary | ICD-10-CM

## 2022-09-12 DIAGNOSIS — M25.531 RIGHT WRIST PAIN: ICD-10-CM

## 2022-09-12 DIAGNOSIS — S66.811A EXTENSOR TENDON RUPTURE OF HAND, RIGHT, INITIAL ENCOUNTER: ICD-10-CM

## 2022-09-12 PROCEDURE — 97763 ORTHC/PROSTC MGMT SBSQ ENC: CPT | Mod: GO | Performed by: OCCUPATIONAL THERAPIST

## 2022-09-12 PROCEDURE — 99024 POSTOP FOLLOW-UP VISIT: CPT | Performed by: STUDENT IN AN ORGANIZED HEALTH CARE EDUCATION/TRAINING PROGRAM

## 2022-09-12 NOTE — LETTER
9/12/2022         RE: Carol Barkley  [address]  [facility] [address]        Dear Colleague,    Thank you for referring your patient, Carol Barkley, to the [facility] [place]. Please see a copy of my visit note below.    Date of Service: Sep 12, 2022    Chief Complaint: Post operative follow up.     Date of Surgery: 8/5/22     Procedure Performed: Right extensor indicis proprius to extensor pollicis longus transfer     Interval events: Carol Barkley is a 67 year old female who presents today for a postoperative follow up.The patient states that she fell several weeks ago while in her thumb spica cast. She states she was going up the stairs and stripped, punching her hand into the steps. She has had some pain and swelling, though this had improved. She was seen 10 days ago at which time her cast was removed and she was placed in a thermoplastic orthosis and initiated ROM.     The past medical history was reviewed updated in the EMR. This includes medications, surgeries, social history, and review of systems.    Physical examination:  Well-developed, well-nourished and in no acute distress.  Alert and oriented to surroundings.  On examination of the  right hand, incision is well-healed. There is no erythema, drainage, or dehiscence. Swelling is Mild. Sensation is intact in median, radial and ulnar nerve distributions. Patient can actively flex and extend all digits.The patient is able to make a full composite fist. Fires EPL and FPL. Fingers are warm and well-perfused. Mild tenderness to palpation about the wrist and MCPs.     Assessment: 67 year old female s/p right extensor indicis proprius to extensor pollicis longus transfer, progressing appropriately.     Plan:  She will continue hand therapy and initiate ROM of the thumb, following extensor tendon rehab protocol. Follow up at 12 weeks post op. Knows to contact us in the interim with any questions or concerns.     HANNAH  BETO ACEVEDO  September 12, 2022 9:28 AM   Orthopaedic Surgery      Attestation signed by Geraldo Fischer MD at 9/12/2022  9:51 PM:  I saw and evaluated the patient and agree with the findings and plan of care as documented in the note.    Patient has excellent active EPL function with hand flat on the table. Repair is strong. Continue extensor tendon rehab protocol. Follow-up at 12 week postop laura.    Geraldo Fischer MD    Hand, Upper Extremity & Microvascular Surgery  Department of Orthopedic Surgery  [facility]

## 2022-09-12 NOTE — NURSING NOTE
Reason For Visit:   Chief Complaint   Patient presents with     RECHECK     Follow up Right extensor indicis proprius to extensor pollicis longus transfer DOS: 8/5/22.  Fell on 8/16/22 while in cast       Primary MD: Andrew Nair  Ref. MD: Bella    Age: 67 year old    ?  No      LMP 11/30/2004       Pain Assessment  Patient Currently in Pain: Yes  0-10 Pain Scale: 3  Primary Pain Location: Hand (right)  Alleviating Factors: NSAIDS               QuickDASH Assessment  QuickDASH Main 9/9/2022   1. Open a tight or new jar. Unable   2. Do heavy household chores (e.g., wash walls, floors) Moderate difficulty   3. Carry a shopping bag or briefcase. Moderate difficulty   4. Wash your back. Moderate difficulty   5. Use a knife to cut food. Severe difficulty   6. Recreational activities in which you take some force or impact through your arm, shoulder or hand (e.g., golf, hammering, tennis, etc.). Severe difficulty   7. During the past week, to what extent has your arm, shoulder or hand problem interfered with your normal social activities with family, friends, neighbours or groups? Moderately   8. During the past week, were you limited in your work or other regular daily activities as a result of your arm, shoulder or hand problem? Very limited   9. Arm, shoulder or hand pain. Moderate   10.Tingling (pins and needles) in your arm,shoulder or hand. Mild   11. During the past week, how much difficulty have you had sleeping because of the pain in your arm, shoulder or hand? Mild difficulty   Quickdash Ability Score 56.82          Current Outpatient Medications   Medication Sig Dispense Refill     clobetasol (TEMOVATE) 0.05 % ointment        fluocinolone (SYNALAR) 0.01 % external solution        omeprazole (PRILOSEC) 20 MG DR capsule Take 1 capsule (20 mg) by mouth 2 times daily 30 capsule 0     PREMARIN vaginal cream        SUMAtriptan (IMITREX) 100 MG tablet TAKE ONE TABLET BY MOUTH AT ONSET OF HEADACHE FOR  MIGRAINE, MAY REPEAT DOSE AFTER 2 HOURS IF NEEDED. DO NOT TAKE MORE THAN 200MG IN 24 HOURS. 9 tablet 0     tretinoin (RETIN-A) 0.025 % cream          Allergies   Allergen Reactions     Dust Mite Extract      Grass        RAJENDRA GUNDERSON, ATC

## 2022-09-12 NOTE — PROGRESS NOTES
SOAP note objective information for 9/12/2022.    Diagnosis: Right EPL rupture  DOI: ~ongoing (8/15/2022 order date)  DOS: 8/5/2022  Procedure:  Right extensor indicis proprius to extensor pollicis longus transfer  Post:  5w 3d     Precautions: dorsal forearm-based orthosis with wrist in 20* extension, thumb midway RABD/PABD, MCP in full extension and IP in neutral/slight hyperextension     Objective:  Pain Level (Scale 0-10)    8/15/2022   At Rest 0/10   With Use NT      Pain Description  Date 8/15/2022   Location wrist, hand and thumb   Pain Quality Tight   Frequency intermittent     Pain is worst  daytime   Exacerbated by  bumping accidentally   Relieved by rest   Progression Unchanged      Edema (Circumference measured in cm)    8/15/2022 8/15/2022     L R   Index P1 6.9 7.5   PIP 6.5 8.2   P2 5.7 6.7   Thumb P1 6.9 8.7   IP 6.3 8.3      ROM  Pain Report: - none  + mild    ++ moderate    +++ severe   Wrist 9/2/2022 9/2/2022 9/7/2022   AROM (PROM) L R R   Extension 69 46 45   Flexion 46 8 29      ROM  Thumb 9/2/2022 9/2/2022 9/7/2022 9/12/22   AROM  (PROM) L R R R   MP /67 0/0  /6 blocked 0/21    IP /74 HE/0  /4 blocked HE/20    RABD 47   NT    PABD 51   NT    Gwenandji Opposition Scale (0-10/10) NT   NT       Strength  Deferred at this time    Home Exercise Program:  Dorsal blocking orthosis - wrist in 20* extension, thumb between radial and palmar abduction, MP in extension, IP in neutral/hyperextension  Gentle Wrist AROM extension/flexion outside orthosis with wrist in neutral  Thumb MP and IP blocking within orthosis  9/7/2022  Tenodesis exercise as warm-up  Wrist extension/flexion at edge of table  Thumb AROM  Picking up pom balls focusing on thumb IP flexion and activating transfer with release  9/12/2022  ***     Next Visit:  Add blocking exercises at 6 weeks post-op

## 2022-09-12 NOTE — PROGRESS NOTES
Date of Service: Sep 12, 2022    Chief Complaint: Post operative follow up.     Date of Surgery: 8/5/22     Procedure Performed: Right extensor indicis proprius to extensor pollicis longus transfer     Interval events: Carol Barkley is a 67 year old female who presents today for a postoperative follow up.The patient states that she fell several weeks ago while in her thumb spica cast. She states she was going up the stairs and stripped, punching her hand into the steps. She has had some pain and swelling, though this had improved. She was seen 10 days ago at which time her cast was removed and she was placed in a thermoplastic orthosis and initiated ROM.     The past medical history was reviewed updated in the EMR. This includes medications, surgeries, social history, and review of systems.    Physical examination:  Well-developed, well-nourished and in no acute distress.  Alert and oriented to surroundings.  On examination of the  right hand, incision is well-healed. There is no erythema, drainage, or dehiscence. Swelling is Mild. Sensation is intact in median, radial and ulnar nerve distributions. Patient can actively flex and extend all digits.The patient is able to make a full composite fist. Fires EPL and FPL. Fingers are warm and well-perfused. Mild tenderness to palpation about the wrist and MCPs.     Assessment: 67 year old female s/p right extensor indicis proprius to extensor pollicis longus transfer, progressing appropriately.     Plan:  She will continue hand therapy and initiate ROM of the thumb, following extensor tendon rehab protocol. Follow up at 12 weeks post op. Knows to contact us in the interim with any questions or concerns.     HANNAH ACEVEDO PA-C  September 12, 2022 9:28 AM   Orthopaedic Surgery

## 2022-09-14 ENCOUNTER — NURSE TRIAGE (OUTPATIENT)
Dept: NURSING | Facility: CLINIC | Age: 67
End: 2022-09-14

## 2022-09-15 ENCOUNTER — VIRTUAL VISIT (OUTPATIENT)
Dept: FAMILY MEDICINE | Facility: CLINIC | Age: 67
End: 2022-09-15
Payer: COMMERCIAL

## 2022-09-15 DIAGNOSIS — U07.1 CLINICAL DIAGNOSIS OF COVID-19: Primary | ICD-10-CM

## 2022-09-15 PROCEDURE — 99213 OFFICE O/P EST LOW 20 MIN: CPT | Mod: CS | Performed by: FAMILY MEDICINE

## 2022-09-15 NOTE — PATIENT INSTRUCTIONS

## 2022-09-15 NOTE — TELEPHONE ENCOUNTER
Triage Call:    Caller: Patient    Situation:   Cough, headache, sore throat, fatigue started today.   She also said there has been a slight fever, but also took medications for it today.       O2 sats 99%.     Patient tested positive on at home COVID test.      Protocol Recommended Disposition: Virtual Visit    Patient verbalized understanding of instructions and questions answered.      Veronica Hannon RN on 9/14/2022 at 7:42 PM        Reason for Disposition    HIGH RISK for severe COVID complications (e.g., weak immune system, age > 64 years, obesity with BMI > 25, pregnant, chronic lung disease or other chronic medical condition)  (Exception: Already seen by PCP and no new or worsening symptoms.)    Additional Information    Negative: SEVERE difficulty breathing (e.g., struggling for each breath, speaks in single words)    Negative: Difficult to awaken or acting confused (e.g., disoriented, slurred speech)    Negative: Bluish (or gray) lips or face now    Negative: Shock suspected (e.g., cold/pale/clammy skin, too weak to stand, low BP, rapid pulse)    Negative: Sounds like a life-threatening emergency to the triager    Negative: [1] Diagnosed or suspected COVID-19 AND [2] symptoms lasting 3 or more weeks    Negative: [1] COVID-19 exposure AND [2] no symptoms    Negative: COVID-19 vaccine reaction suspected (e.g., fever, headache, muscle aches) occurring 1 to 3 days after getting vaccine    Negative: COVID-19 vaccine, questions about    Negative: [1] Lives with someone known to have influenza (flu test positive) AND [2] flu-like symptoms (e.g., cough, runny nose, sore throat, SOB; with or without fever)    Negative: [1] Adult with possible COVID-19 symptoms AND [2] triager concerned about severity of symptoms or other causes    Negative: COVID-19 and breastfeeding, questions about    Negative: SEVERE or constant chest pain or pressure  (Exception: Mild central chest pain, present only when coughing.)     Negative: MODERATE difficulty breathing (e.g., speaks in phrases, SOB even at rest, pulse 100-120)    Negative: [1] Headache AND [2] stiff neck (can't touch chin to chest)    Negative: Oxygen level (e.g., pulse oximetry) 90 percent or lower    Negative: Chest pain or pressure    Negative: Patient sounds very sick or weak to the triager    Negative: MILD difficulty breathing (e.g., minimal/no SOB at rest, SOB with walking, pulse <100)    Negative: Fever > 103 F (39.4 C)    Negative: [1] Fever > 101 F (38.3 C) AND [2] age > 60 years    Negative: [1] Fever > 100.0 F (37.8 C) AND [2] bedridden (e.g., nursing home patient, CVA, chronic illness, recovering from surgery)    Protocols used: CORONAVIRUS (COVID-19) DIAGNOSED OR [id number] 1.18.2022

## 2022-09-15 NOTE — PROGRESS NOTES
Carol is a 67 year old who is being evaluated via a billable telephone visit.    9:39 AM-start  9:50 AM -end visit    What phone number would you like to be contacted at? [phone]  How would you like to obtain your AVS? MyChart    Assessment & Plan         Clinical diagnosis of COVID-19  SEE [facility] care orders  The potential side effects of this medication have been discussed with the patient.  Call if any significant problems with these are experienced.    - nirmatrelvir and ritonavir (PAXLOVID) therapy pack; Take 3 tablets by mouth 2 times daily for 5 days (Take 2 Nirmatrelvir tablets and 1 Ritonavir tablet twice daily for 5 days)  Discussed risk of relapse  If any worse,sob or chest pain-go to DINH Stone MD  [facility]   Carol is a 67 year old, presenting for the following health issues:  Covid Concern      HPI       COVID-19 Symptom Review-positive covid test -yesterday  How many days ago did these symptoms start?  Symptoms started yesterday. Patient took an at home covid test yesterday.     Are any of the following symptoms significant for you?    New or worsening difficulty breathing? No    Worsening cough? Denies worsening cough. Productive cough.     Fever or chills? No    Headache: No    Sore throat: YES    Chest pain: No    Diarrhea: No    Body aches? YES    What treatments has patient tried? Daytime Tylenol   Does patient live in a nursing home, group home, or shelter? No  Does patient have a way to get food/medications during quarantined? Yes. Patient's .         Review of Systems   CONSTITUTIONAL: NEGATIVE for fever, chills, change in weight  ENT/MOUTH: as above  RESP:cough, no sob  Cough nonprodutive   CV: NEGATIVE for chest pain, palpitations or peripheral edema  GI: NEGATIVE for nausea, abdominal pain, heartburn, or change in bowel habits      Objective           Vitals:  No vitals were obtained today due to virtual visit.    Physical Exam    healthy, alert and no distress  PSYCH: Alert and oriented times 3; coherent speech, normal   rate and volume, able to articulate logical thoughts, able   to abstract reason, no tangential thoughts, no hallucinations   or delusions  Her affect is normal  RESP: No cough, no audible wheezing, able to talk in full sentences  Remainder of exam unable to be completed due to telephone visits            Phone call duration: as above minutes

## 2022-09-19 ENCOUNTER — TELEPHONE (OUTPATIENT)
Dept: OCCUPATIONAL THERAPY | Facility: CLINIC | Age: 67
End: 2022-09-19

## 2022-09-21 ENCOUNTER — HOSPITAL ENCOUNTER (OUTPATIENT)
Facility: CLINIC | Age: 67
End: 2022-09-21
Attending: COLON & RECTAL SURGERY | Admitting: COLON & RECTAL SURGERY
Payer: COMMERCIAL

## 2022-09-21 ENCOUNTER — TELEPHONE (OUTPATIENT)
Dept: GASTROENTEROLOGY | Facility: CLINIC | Age: 67
End: 2022-09-21

## 2022-09-21 DIAGNOSIS — Z12.11 ENCOUNTER FOR SCREENING COLONOSCOPY: Primary | ICD-10-CM

## 2022-09-21 RX ORDER — BISACODYL 5 MG/1
TABLET, DELAYED RELEASE ORAL
Qty: 4 TABLET | Refills: 0 | Status: SHIPPED | OUTPATIENT
Start: 2022-09-21 | End: 2022-11-13

## 2022-09-21 NOTE — TELEPHONE ENCOUNTER
Pre assessment questions NOT completed for upcoming colonoscopy procedure scheduled on 9.29.22.     Patient tested positive for covid on 9.14.22 with onset of symptoms that same day. Patient reporting congestion at this time.     Transferred to scheduling to reschedule due to ongoing covid symptoms.    Magda Morton RN

## 2022-09-21 NOTE — TELEPHONE ENCOUNTER
Caller: Carol Barkley      Procedure: Colon    Date, Location, and Surgeon of Procedure Cancelled: 11/3, [facility]    Ordering Provider:Jovanna Sheldon APRN CNP     Reason for cancel (please be detailed, any staff messages or encounters to note?): Rescheduled        Rescheduled: Y     If rescheduled:    Date: 11/2   Location:    Prep Resent: Yes (changes to prep?)   Covid Test Rescheduled: no   Note any change or update to original order/sedation:

## 2022-09-21 NOTE — TELEPHONE ENCOUNTER
Attempted to contact patient regarding upcoming colonoscopy procedure on 9.29.2022 for pre assessment questions. No answer.     Left message to return call to [phone] #4    Confirm when patient tested positive for COVID.  Onset s/sx?  Have s/sx resolved?    Arrival time: 0810    Facility location: [facility]    Sedation type: CS    Indication for procedure: screening colonoscopy    Bowel prep recommendation: Golytely prep d/t mag citrate recall    Prep instructions sent via [employer].  Prep prescription sent to    [facility] [address] [place] [address] GILA Tucker RN

## 2022-09-21 NOTE — TELEPHONE ENCOUNTER
Caller: Carol Barkley      Procedure: COLON    Date, Location, and Surgeon of Procedure Cancelled: 9/29, [facility] [place] [facility]    Ordering Provider:BAM    Reason for cancel (please be detailed, any staff messages or encounters to note?): COVID+ ON 9/14        Rescheduled: Y     If rescheduled:    Date: 11/3   Location:    Prep Resent: GPREP(changes to prep?)   Covid Test Rescheduled: NOT NEEDED   Note any change or update to original order/sedation:

## 2022-09-23 NOTE — PROGRESS NOTES
SOAP note objective information for 9/23/2022.    Diagnosis: Right EPL rupture  DOI: ~ongoing (8/15/2022 order date)  DOS: 8/5/2022  Procedure:  Right extensor indicis proprius to extensor pollicis longus transfer  Post:  7w 3d    Precautions: dorsal forearm-based orthosis with wrist in 20* extension, thumb midway RABD/PABD, MCP in full extension and IP in neutral/slight hyperextension    Subjective:  Carol Barkley is a 67 year old female.    Patient reports symptoms of the right thumb which occurred due to gradual onset after wrist fracture.    Current occupation:The patient has an organist part-time.    She also takes care of her 2-year-old and 3-month-old grandchildren a several days a week.  Job Tasks: Lifting, Carrying, Repetitive Tasks    Functional Outcome Measure:   Upper Extremity Functional Index Score:  SCORE:   Column Totals: /80: 18   (A lower score indicates greater disability.)    Objective:  Pain Level (Scale 0-10)   8/15/2022   At Rest 0/10   With Use NT     Pain Description  Date 8/15/2022   Location wrist, hand and thumb   Pain Quality Tight   Frequency intermittent     Pain is worst  daytime   Exacerbated by  bumping accidentally   Relieved by rest   Progression Unchanged     Edema (Circumference measured in cm)   8/15/2022 8/15/2022    L R   Index P1 6.9 7.5   PIP 6.5 8.2   P2 5.7 6.7   Thumb P1 6.9 8.7   IP 6.3 8.3     Sensation   NT    ROM  Pain Report: - none  + mild    ++ moderate    +++ severe   Wrist 9/2/2022 9/2/2022 9/26/22   AROM (PROM) L R R   Extension 69 46 59   Flexion 46 8 21     ROM  Thumb 9/2/2022 9/2/2022 9/26/22   AROM  (PROM) L R R   MP /67 0/0  /6 blocked /33   IP /74 HE/0  /4 blocked /30   RABD 47     PABD 51     Retropulsion NT     Kapandji Opposition Scale (0-10/10) NT       Strength  Deferred    Please refer to the daily flowsheet for treatment today, total treatment time and time spent performing 1:1 timed codes.    Home Exercise Program:  Dorsal blocking orthosis  Thumb  AROM flexion, palmar abduction, opposition  Thumb MP and IP blocking    Next Visit:  Review/progress HEP

## 2022-09-26 ENCOUNTER — THERAPY VISIT (OUTPATIENT)
Dept: OCCUPATIONAL THERAPY | Facility: CLINIC | Age: 67
End: 2022-09-26
Payer: COMMERCIAL

## 2022-09-26 DIAGNOSIS — S66.811A EXTENSOR TENDON RUPTURE OF HAND, RIGHT, INITIAL ENCOUNTER: Primary | ICD-10-CM

## 2022-09-26 PROCEDURE — 97110 THERAPEUTIC EXERCISES: CPT | Mod: GO | Performed by: OCCUPATIONAL THERAPIST

## 2022-10-10 ENCOUNTER — THERAPY VISIT (OUTPATIENT)
Dept: OCCUPATIONAL THERAPY | Facility: CLINIC | Age: 67
End: 2022-10-10
Payer: COMMERCIAL

## 2022-10-10 ENCOUNTER — OFFICE VISIT (OUTPATIENT)
Dept: ORTHOPEDICS | Facility: CLINIC | Age: 67
End: 2022-10-10
Payer: COMMERCIAL

## 2022-10-10 DIAGNOSIS — Z98.890 POST-OPERATIVE STATE: ICD-10-CM

## 2022-10-10 DIAGNOSIS — S66.811A EXTENSOR TENDON RUPTURE OF HAND, RIGHT, INITIAL ENCOUNTER: Primary | ICD-10-CM

## 2022-10-10 DIAGNOSIS — S66.811D RUPTURE OF EXTENSOR TENDONS OF RIGHT HAND AND WRIST, SUBSEQUENT ENCOUNTER: Primary | ICD-10-CM

## 2022-10-10 PROCEDURE — 97110 THERAPEUTIC EXERCISES: CPT | Mod: GO | Performed by: OCCUPATIONAL THERAPIST

## 2022-10-10 PROCEDURE — 99024 POSTOP FOLLOW-UP VISIT: CPT | Performed by: STUDENT IN AN ORGANIZED HEALTH CARE EDUCATION/TRAINING PROGRAM

## 2022-10-10 NOTE — PROGRESS NOTES
"Hand Therapy Discharge Note    Current Date:  10/10/2022    Diagnosis: Right EPL rupture  DOI: ~ongoing (8/15/2022 order date)  DOS: 8/5/2022  Procedure:  Right extensor indicis proprius to extensor pollicis longus transfer  Post:  9w 3d    Reporting period is 9/2/2022 to 10/10/2022    Precautions:     Subjective:  Carol Barkley is a 67 year old female.    Patient reports symptoms of the right thumb which occurred due to gradual onset after wrist fracture.    Current occupation:The patient has an organist part-time.    She also takes care of her 2-year-old and 3-month-old grandchildren a several days a week.  Job Tasks: Lifting, Carrying, Repetitive Tasks    Subjective:   Subjective changes noted by patient:  \"I did an hour and a half, two hours on Thursday.   Functional changes noted by patient:  Improvement in Self Care Tasks  Patient has noted adverse reaction to:  None    Functional Outcome Measure:   Upper Extremity Functional Index Score:  SCORE:   Column Totals: /80: 52   (A lower score indicates greater disability.)    Objective:  Pain Level (Scale 0-10)   8/15/2022   At Rest 0/10   With Use NT     Pain Description  Date 8/15/2022   Location wrist, hand and thumb   Pain Quality Tight   Frequency intermittent     Pain is worst  daytime   Exacerbated by  bumping accidentally   Relieved by rest   Progression Unchanged     Edema (Circumference measured in cm)   8/15/2022 8/15/2022    L R   Index P1 6.9 7.5   PIP 6.5 8.2   P2 5.7 6.7   Thumb P1 6.9 8.7   IP 6.3 8.3     Sensation   NT    ROM  Pain Report: - none  + mild    ++ moderate    +++ severe   Wrist 9/2/2022 9/2/2022 9/26/22 10/10/22   AROM (PROM) L R R R   Extension 69 46 59 60   Flexion 46 8 21 24     ROM  Thumb 9/2/2022 9/2/2022 9/26/22 10/10/22   AROM  (PROM) L R R R   MP /67 0/0  /6 blocked /33 /39   IP /74 HE/0  /4 blocked /30 /38   RABD 47   51   PABD 51   48   Retropulsion NT      Gwenandji Opposition Scale (0-10/10) NT    "     Strength  Deferred    Please refer to the daily flowsheet for treatment provided today.     Assessment:  Response to therapy has been improvement to:  ROM of Thumb:  Flex  Appropriateness of Rx I have re-evaluated this patient and find that the nature, scope, duration and intensity of the therapy is appropriate for the medical condition of the patient.  Overall Assessment:  Patient has met Short and Long Term Treatment Goals.  Patient is ready to be discharged from therapy and continue their home treatment program.  STG/LTG:  See goal sheet for details and updates.    Plan:  Frequency/Duration:  Discharge from Hand Therapy; continue home program.    Recommendations for Home Program - see below    Home Exercise Program:  Dorsal blocking orthosis  Thumb AROM flexion, palmar abduction, opposition  Thumb MP and IP blocking

## 2022-10-10 NOTE — PROGRESS NOTES
Date of Service: Oct 10, 2022    Chief Complaint: Post operative follow up.      Date of Surgery: 8/5/22     Procedure Performed: Right extensor indicis proprius to extensor pollicis longus transfer.     Interval events: Carol Barkley is a 67 year old female who presents today for a postoperative follow up. She reports that she is doing well. 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 She is back to most activities. She feels she continued to have a weak  strength. She has continued with hand therapy and has worn her brace only when out of the house for protection.     The past medical history was reviewed updated in the EMR. This includes medications, surgeries, social history, and review of systems.    Physical examination:  Well-developed, well-nourished and in no acute distress.  Alert and oriented to surroundings.  On examination of the right hand, incision is well-healed. There is no erythema, drainage, or dehiscence. Swelling is Mild. Sensation is intact in median, radial and ulnar nerve distributions. Patient can actively flex and extend all digits. Fires EPL and FPL. Fingers are warm and well-perfused. Mild tenderness to palpation about the wrist and MCPs. excellent active EPL function with hand flat on the table. Able to appose the thumb to distal tip of the ring finger. AROM of the right wrist is as follows: 65  extension, 30  flexion, Full supination & pronation.    Assessment: 67 year old female s/p right extensor indicis proprius to extensor pollicis longus transfer, progressing appropriately.      Plan: Continue with hand therapy for ROM. No restrictions. She should discontinue orthosis. Follow up in 2 months for ROM check. Knows to contact us in the interim with any questions or concerns.     HANNAH ACEVEDO PA-C  October 10, 2022 9:19 AM   Orthopaedic Surgery

## 2022-10-10 NOTE — NURSING NOTE
Reason For Visit:   Chief Complaint   Patient presents with     Surgical Followup     2 mo POP right tendon transder EIP to EPL DOS: 8/5/22       Primary MD: Andrew Nair  Ref. MD: FARHAN    Age: 67 year old    ?  No      LMP 11/30/2004       Pain Assessment  Patient Currently in Pain: No (Some right wrist pain with activity)    Hand Dominance Evaluation  Hand Dominance: Right          QuickDASH Assessment  QuickDASH Main 10/9/2022   1. Open a tight or new jar. Unable   2. Do heavy household chores (e.g., wash walls, floors) Severe difficulty   3. Carry a shopping bag or briefcase. Mild difficulty   4. Wash your back. Moderate difficulty   5. Use a knife to cut food. Moderate difficulty   6. Recreational activities in which you take some force or impact through your arm, shoulder or hand (e.g., golf, hammering, tennis, etc.). Unable   7. During the past week, to what extent has your arm, shoulder or hand problem interfered with your normal social activities with family, friends, neighbours or groups? Slightly   8. During the past week, were you limited in your work or other regular daily activities as a result of your arm, shoulder or hand problem? Moderately limited   9. Arm, shoulder or hand pain. Moderate   10.Tingling (pins and needles) in your arm,shoulder or hand. Mild   11. During the past week, how much difficulty have you had sleeping because of the pain in your arm, shoulder or hand? Mild difficulty   Quickdash Ability Score 52.27          Current Outpatient Medications   Medication Sig Dispense Refill     bisacodyl (DULCOLAX) 5 MG EC tablet Take as directed. One day before exam take 2 tablets at 3 PM. Take 2 tablets at 11 PM. 4 tablet 0     clobetasol (TEMOVATE) 0.05 % ointment        fluocinolone (SYNALAR) 0.01 % external solution        omeprazole (PRILOSEC) 20 MG DR capsule Take 1 capsule (20 mg) by mouth 2 times daily 30 capsule 0     polyethylene glycol (GOLYTELY) 236 g suspension Take as  directed. One day before exam fill the jug with water. Cover and shake until well mixed. At 6 PM start drinking an 8oz glass of mixture every 15 minutes until jug is 1/2 empty. Store remainder in the refrigerator.  At 11 PM Start drinking the other half of the Golytely jug. Drink one 8-ounce glass every 15 minutes until the jug is empty. 4000 mL 0     PREMARIN vaginal cream        SUMAtriptan (IMITREX) 100 MG tablet TAKE ONE TABLET BY MOUTH AT ONSET OF HEADACHE FOR MIGRAINE, MAY REPEAT DOSE AFTER 2 HOURS IF NEEDED. DO NOT TAKE MORE THAN 200MG IN 24 HOURS. 9 tablet 0     tretinoin (RETIN-A) 0.025 % cream          Allergies   Allergen Reactions     Dust Mite Extract      Grass        MANOLO Bello

## 2022-10-10 NOTE — LETTER
10/10/2022         RE: Carol Barkley  [address]  [facility] [address]        Dear Colleague,    Thank you for referring your patient, Carol Barkley, to the [facility] [place]. Please see a copy of my visit note below.    Date of Service: Oct 10, 2022    Chief Complaint: Post operative follow up.      Date of Surgery: 8/5/22     Procedure Performed: Right extensor indicis proprius to extensor pollicis longus transfer.     Interval events: Carol Barkley is a 67 year old female who presents today for a postoperative follow up. She reports that she is doing well. 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 She is back to most activities. She feels she continued to have a weak  strength. She has continued with hand therapy and has worn her brace only when out of the house for protection.     The past medical history was reviewed updated in the EMR. This includes medications, surgeries, social history, and review of systems.    Physical examination:  Well-developed, well-nourished and in no acute distress.  Alert and oriented to surroundings.  On examination of the right hand, incision is well-healed. There is no erythema, drainage, or dehiscence. Swelling is Mild. Sensation is intact in median, radial and ulnar nerve distributions. Patient can actively flex and extend all digits. Fires EPL and FPL. Fingers are warm and well-perfused. Mild tenderness to palpation about the wrist and MCPs. excellent active EPL function with hand flat on the table. Able to appose the thumb to distal tip of the ring finger. AROM of the right wrist is as follows: 65  extension, 30  flexion, Full supination & pronation.    Assessment: 67 year old female s/p right extensor indicis proprius to extensor pollicis longus transfer, progressing appropriately.      Plan: Continue with hand therapy for ROM. No restrictions. She should discontinue orthosis. Follow up in 2 months for ROM check. Knows to  contact us in the interim with any questions or concerns.     HANNAH ACEVEDO PA-C  October 10, 2022 9:19 AM   Orthopaedic Surgery      Attestation signed by Geraldo Fischer MD at 10/10/2022 11:06 AM (Updated):  I saw and evaluated the patient and agree with the findings and plan of care as documented in the note.    ROM is improving. Discontinue splint. Tenodesis nearly symmetric. Continue working on thumb ROM and IP flexion. Follow-up 2 months for ROM check.    Geraldo Fischer MD    Hand, Upper Extremity & Microvascular Surgery  Department of Orthopedic Surgery  [facility]

## 2022-10-13 ENCOUNTER — OFFICE VISIT (OUTPATIENT)
Dept: OPHTHALMOLOGY | Facility: CLINIC | Age: 67
End: 2022-10-13
Payer: COMMERCIAL

## 2022-10-13 VITALS — BODY MASS INDEX: 28.12 KG/M2 | HEIGHT: 66 IN | WEIGHT: 175 LBS

## 2022-10-13 DIAGNOSIS — H00.025 HORDEOLUM INTERNUM OF LEFT LOWER EYELID: Primary | ICD-10-CM

## 2022-10-13 DIAGNOSIS — L71.8 OCULAR ROSACEA: ICD-10-CM

## 2022-10-13 PROCEDURE — 99203 OFFICE O/P NEW LOW 30 MIN: CPT | Performed by: OPTOMETRIST

## 2022-10-13 RX ORDER — DOXYCYCLINE 50 MG/1
50 CAPSULE ORAL 2 TIMES DAILY
Qty: 60 CAPSULE | Refills: 0 | Status: SHIPPED | OUTPATIENT
Start: 2022-10-13 | End: 2022-11-13

## 2022-10-13 ASSESSMENT — VISUAL ACUITY
OS_CC: 20/40
OS_PH_CC: 20/30
OD_CC+: -1
OD_PH_CC: 20/25
METHOD: SNELLEN - LINEAR
OS_CC+: -1
OD_CC: 20/30

## 2022-10-13 ASSESSMENT — CONF VISUAL FIELD
OD_SUPERIOR_TEMPORAL_RESTRICTION: 0
OS_SUPERIOR_TEMPORAL_RESTRICTION: 0
OD_NORMAL: 1
OS_NORMAL: 1
METHOD: COUNTING FINGERS
OD_INFERIOR_TEMPORAL_RESTRICTION: 0
OS_INFERIOR_TEMPORAL_RESTRICTION: 0
OS_SUPERIOR_NASAL_RESTRICTION: 0
OD_SUPERIOR_NASAL_RESTRICTION: 0
OD_INFERIOR_NASAL_RESTRICTION: 0
OS_INFERIOR_NASAL_RESTRICTION: 0

## 2022-10-13 ASSESSMENT — TONOMETRY
IOP_METHOD: ICARE
OS_IOP_MMHG: 11
OD_IOP_MMHG: 12

## 2022-10-13 ASSESSMENT — EXTERNAL EXAM - RIGHT EYE: OD_EXAM: NORMAL

## 2022-10-13 ASSESSMENT — EXTERNAL EXAM - LEFT EYE: OS_EXAM: NORMAL

## 2022-10-13 NOTE — NURSING NOTE
"Chief Complaints and History of Present Illnesses   Patient presents with     Bump On Left Lower Lid     Sore red bump and trouble seeing out of it initally this began 3 weeks ago and continued to get worse.     Chief Complaint(s) and History of Present Illness(es)     Bump On Left Lower Lid            Laterality: left lower lid    Associated symptoms: discharge, mattering and tearing.  Negative for lid pain    Treatments tried: artificial tears    Pain scale: 0/10    Comments: Sore red bump and trouble seeing out of it initally this began 3 weeks ago and continued to get worse.          Comments    Had it excised 2 weeks ago. Bump did not resolve completely and left eye stil \" weeps or crusts up in the morning\". Still doing compress treatment twice daily with left eye.   Its not painful right now.     Also a similar rash in March or April of this year on the same eye had the same type of bump present along with a rash on arm. This was treated with Doxycline. This looked possible Lyme's. Has noticed this past year that the lids may be asymmetric or feel that LLL may be lower than RLL.    Cathy Reynolds, COT COT 12:11 PM October 13, 2022                      "

## 2022-10-13 NOTE — PROGRESS NOTES
"History  HPI     Bump On Left Lower Lid    In left lower lid.  Associated symptoms include discharge, mattering and tearing.  Negative for lid pain.  Treatments tried include artificial tears.  Pain was noted as 0/10. Additional comments: Sore red bump and trouble seeing out of it initally this began 3 weeks ago and continued to get worse.           Comments    Had it excised 2 weeks ago. Bump did not resolve completely and left eye stil \" weeps or crusts up in the morning\". Still doing compress treatment twice daily with left eye.   Its not painful right now.     Also a similar rash in March or April of this year on the same eye had the same type of bump present along with a rash on arm. This was treated with Doxycline. This looked possible Lyme's. Has noticed this past year that the lids may be asymmetric or feel that LLL may be lower than RLL.    JOSHUA Ta COT 12:11 PM October 13, 2022               Last edited by Cathy Reynolds COT on 10/13/2022 12:14 PM.          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 Recommended using Drake mask for warm compresses daily twice each day for ten minutes. Recommended Ocusoft lid scrubs twice weekly. Prescribed 50mg doxycycline twice each day for 30 days. Monitor at comprehensive eye exam in 1 month.    Complete documentation of historical and exam elements from today's encounter can  be found in the full encounter summary report (not reduplicated in this progress  note). I personally obtained the chief complaint(s) and history of present illness. I  confirmed and edited as necessary the review of systems, past medical/surgical  history, family history, social history, and examination findings as documented " by  others; and I examined the patient myself. I personally reviewed the relevant tests,  images, and reports as documented above. I formulated and edited as necessary the  assessment and plan and discussed the findings and management plan with the  patient and family.    Avtar Sears OD, FAAO

## 2022-10-13 NOTE — NURSING NOTE
"Chief Complaints and History of Present Illnesses   Patient presents with     Bump On Left Lower Lid     Sore red bump and trouble seeing out of it initally this began 3 weeks ago and continued to get worse.     Chief Complaint(s) and History of Present Illness(es)     Bump On Left Lower Lid            Laterality: left lower lid    Associated symptoms: discharge, mattering and tearing.  Negative for lid pain    Treatments tried: artificial tears    Pain scale: 0/10    Comments: Sore red bump and trouble seeing out of it initally this began 3 weeks ago and continued to get worse.          Comments    Had it excised 2 weeks ago. Bump did not resolve completely and left eye stil \" weeps or crusts up in the morning\". Still doing compress treatment twice daily with left eye.   Its not painful right now.     Also a similar rash in March or April of this year on the same eye had the same type of bump present along with a rash on arm. This was treated with Doxycline. This looked possible Lyme's.     JOSHUA Ta COT 12:11 PM October 13, 2022                      "

## 2022-10-20 ENCOUNTER — TELEPHONE (OUTPATIENT)
Dept: GASTROENTEROLOGY | Facility: CLINIC | Age: 67
End: 2022-10-20

## 2022-10-20 NOTE — TELEPHONE ENCOUNTER
Caller: Carol Barkley      Procedure: COLON    Date, Location, and Surgeon of Procedure Cancelled: 11/2, ISHMAEL PAYAN    Ordering Provider:BAM    Reason for cancel (please be detailed, any staff messages or encounters to note?): PT REQUESTED A DIFFERENT PROVIDER        Rescheduled: Y     If rescheduled:    Date: 1/10   Location: [facility]   Prep Resent: GPREP(changes to prep?)   Covid Test Rescheduled: HOME   Note any change or update to original order/sedation:

## 2022-10-23 ENCOUNTER — HEALTH MAINTENANCE LETTER (OUTPATIENT)
Age: 67
End: 2022-10-23

## 2022-11-06 ENCOUNTER — OFFICE VISIT (OUTPATIENT)
Dept: FAMILY MEDICINE | Facility: CLINIC | Age: 67
End: 2022-11-06
Payer: COMMERCIAL

## 2022-11-06 VITALS
WEIGHT: 187 LBS | DIASTOLIC BLOOD PRESSURE: 88 MMHG | RESPIRATION RATE: 18 BRPM | HEART RATE: 75 BPM | OXYGEN SATURATION: 99 % | SYSTOLIC BLOOD PRESSURE: 157 MMHG | TEMPERATURE: 98.6 F | BODY MASS INDEX: 30.18 KG/M2

## 2022-11-06 DIAGNOSIS — H60.392 INFECTIVE OTITIS EXTERNA, LEFT: Primary | ICD-10-CM

## 2022-11-06 DIAGNOSIS — H60.392 INFECTIVE OTITIS EXTERNA, LEFT: ICD-10-CM

## 2022-11-06 DIAGNOSIS — L21.9 SEBORRHEIC DERMATITIS: ICD-10-CM

## 2022-11-06 PROCEDURE — 99213 OFFICE O/P EST LOW 20 MIN: CPT | Performed by: PHYSICIAN ASSISTANT

## 2022-11-06 RX ORDER — CIPROFLOXACIN AND DEXAMETHASONE 3; 1 MG/ML; MG/ML
4 SUSPENSION/ DROPS AURICULAR (OTIC) 2 TIMES DAILY
Qty: 2.8 ML | Refills: 0 | Status: SHIPPED | OUTPATIENT
Start: 2022-11-06 | End: 2022-11-07

## 2022-11-06 RX ORDER — KETOCONAZOLE 20 MG/ML
SHAMPOO TOPICAL DAILY PRN
Qty: 120 ML | Refills: 6 | Status: SHIPPED | OUTPATIENT
Start: 2022-11-06

## 2022-11-06 NOTE — PROGRESS NOTES
Assessment & Plan     Infective otitis externa, left  cipro dex as ordered.  No qtips.   Warm compesses and NSAIDS for pain. Follow-up prn.    - ciprofloxacin-dexamethasone (CIPRODEX) 0.3-0.1 % otic suspension  Dispense: 2.8 mL; Refill: 0    Seborrheic dermatitis  nizoral as ordered.    PI given and disucssed.  .  RTC for persistent or worsening sx.     - ketoconazole (NIZORAL) 2 % external shampoo  Dispense: 120 mL; Refill: 6       BETO [facility] JASON Medina is a 67 year old female who presents to clinic today for the following health issues:  Chief Complaint   Patient presents with     Ear Problem     Left ear pain radiates to throat x 3 days      HPI    L ear pain x 3 days, hurts worse with palpation and laying on it. No hearing change.  No recent uri sx.    no fevers. No drainage.    Has noted dry flaking patches on the scalp, using OTC medicated shampoo without improvement.  Rinses immediately does not let set for any period.      Review of Systems  Constitutional, HEENT, cardiovascular, pulmonary, gi and gu systems are negative, except as otherwise noted.      Objective    BP (!) 157/88 (BP Location: Right arm, Patient Position: Sitting, Cuff Size: Adult Large)   Pulse 75   Temp 98.6  F (37  C) (Oral)   Resp 18   Wt 84.8 kg (187 lb)   LMP 11/30/2004   SpO2 99%   BMI 30.18 kg/m    Physical Exam   Pt is in no acute distress and appears well  Ears patent B:  TM s intact, non-injected. All land marks easily visibile.  L canal is edematous and erythema,tous, TTP of the auricle and pinna.      Nasal mucosa is non-edematous, no discharge.    9

## 2022-11-07 DIAGNOSIS — H60.92 OTITIS EXTERNA OF LEFT EAR, UNSPECIFIED CHRONICITY, UNSPECIFIED TYPE: Primary | ICD-10-CM

## 2022-11-07 RX ORDER — CIPROFLOXACIN AND DEXAMETHASONE 3; 1 MG/ML; MG/ML
4 SUSPENSION/ DROPS AURICULAR (OTIC) 2 TIMES DAILY
Qty: 7.5 ML | Refills: 0 | Status: SHIPPED | OUTPATIENT
Start: 2022-11-07 | End: 2022-11-13

## 2022-11-07 RX ORDER — NEOMYCIN SULFATE, POLYMYXIN B SULFATE AND HYDROCORTISONE 10; 3.5; 1 MG/ML; MG/ML; [USP'U]/ML
3 SUSPENSION/ DROPS AURICULAR (OTIC) 4 TIMES DAILY
Qty: 10 ML | Refills: 0 | Status: SHIPPED | OUTPATIENT
Start: 2022-11-07 | End: 2022-11-13

## 2022-11-10 ENCOUNTER — OFFICE VISIT (OUTPATIENT)
Dept: OPHTHALMOLOGY | Facility: CLINIC | Age: 67
End: 2022-11-10
Payer: COMMERCIAL

## 2022-11-10 DIAGNOSIS — H52.203 MYOPIC ASTIGMATISM OF BOTH EYES: ICD-10-CM

## 2022-11-10 DIAGNOSIS — H00.025 HORDEOLUM INTERNUM OF LEFT LOWER EYELID: ICD-10-CM

## 2022-11-10 DIAGNOSIS — L71.8 OCULAR ROSACEA: Primary | ICD-10-CM

## 2022-11-10 DIAGNOSIS — H25.093 AGE-RELATED INCIPIENT CATARACT OF BOTH EYES: ICD-10-CM

## 2022-11-10 DIAGNOSIS — Z83.518 FAMILY HISTORY OF FUCHS' CORNEAL DYSTROPHY: ICD-10-CM

## 2022-11-10 DIAGNOSIS — H52.13 MYOPIC ASTIGMATISM OF BOTH EYES: ICD-10-CM

## 2022-11-10 DIAGNOSIS — H43.812 POSTERIOR VITREOUS DETACHMENT, LEFT: ICD-10-CM

## 2022-11-10 PROCEDURE — 92014 COMPRE OPH EXAM EST PT 1/>: CPT | Performed by: OPTOMETRIST

## 2022-11-10 PROCEDURE — 92015 DETERMINE REFRACTIVE STATE: CPT | Performed by: OPTOMETRIST

## 2022-11-10 ASSESSMENT — REFRACTION_MANIFEST
OS_SPHERE: -6.25
OD_ADD: +2.50
OD_AXIS: 015
OS_AXIS: 160
OS_ADD: +2.50
OS_CYLINDER: +3.00
OD_SPHERE: -8.00
OD_CYLINDER: +3.50

## 2022-11-10 ASSESSMENT — TONOMETRY
IOP_METHOD: ICARE
OS_IOP_MMHG: 12
OD_IOP_MMHG: 12

## 2022-11-10 ASSESSMENT — VISUAL ACUITY
OS_CC: 20/25
OD_CC: 20/25
CORRECTION_TYPE: GLASSES
OS_CC+: -2
OD_CC+: +2
METHOD: SNELLEN - LINEAR

## 2022-11-10 ASSESSMENT — CONF VISUAL FIELD
OD_SUPERIOR_NASAL_RESTRICTION: 0
OD_INFERIOR_TEMPORAL_RESTRICTION: 0
OS_NORMAL: 1
OS_INFERIOR_NASAL_RESTRICTION: 0
OD_SUPERIOR_TEMPORAL_RESTRICTION: 0
OS_SUPERIOR_NASAL_RESTRICTION: 0
OD_INFERIOR_NASAL_RESTRICTION: 0
METHOD: COUNTING FINGERS
OD_NORMAL: 1
OS_SUPERIOR_TEMPORAL_RESTRICTION: 0
OS_INFERIOR_TEMPORAL_RESTRICTION: 0

## 2022-11-10 ASSESSMENT — EXTERNAL EXAM - LEFT EYE: OS_EXAM: NORMAL

## 2022-11-10 ASSESSMENT — EXTERNAL EXAM - RIGHT EYE: OD_EXAM: NORMAL

## 2022-11-10 ASSESSMENT — CUP TO DISC RATIO
OD_RATIO: 0.2
OS_RATIO: 0.2

## 2022-11-10 ASSESSMENT — REFRACTION_WEARINGRX
OS_CYLINDER: +3.00
OD_ADD: +2.50
OD_SPHERE: -8.00
OS_SPHERE: -7.25
OD_AXIS: 179
OD_CYLINDER: +3.50
OS_AXIS: 160
OS_ADD: +2.50

## 2022-11-10 NOTE — NURSING NOTE
Chief Complaints and History of Present Illnesses   Patient presents with     Follow Up     Chief Complaint(s) and History of Present Illness(es)     Follow Up            Laterality: both eyes    Associated symptoms: Negative for dryness, eye pain, flashes and floaters    Treatments tried: eye drops    Pain scale: 0/10          Comments    Patient states all lid issues have resolved. Patient denies flashes of light and new floaters each eye. Patient states she has an earache but doesn't believe it effects vision.     Carolyn Nation, FORD November 10, 2022 12:11 PM

## 2022-11-10 NOTE — PROGRESS NOTES
History  HPI     Follow Up    In both eyes.  Associated symptoms include Negative for dryness, eye pain, flashes and floaters.  Treatments tried include eye drops.  Pain was noted as 0/10.           Comments    Patient states all lid issues have resolved. Patient denies flashes of light and new floaters each eye. Patient states she has an earache but doesn't believe it effects vision.     FORD Lepe November 10, 2022 12:11 PM           Last edited by Pam Nation on 11/10/2022 12:11 PM.          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 Recommended continued use of warm compresses daily for ten minutes and lid scrubs as needed. Monitor annually, or sooner if symptoms worsen.   Doxycycline discontinued at this time. Will re-prescibe short course as needed if symptoms return.    (H52.203,  H52.13) Myopic astigmatism of both eyes  Comment: Myopic astigmatism with presbyopia both eyes, refractive shift left eye   Plan: REFRACTION         Dispensed spectacle prescription for full time wear. Monitor annually.    (Z83.518) Family history of Fuchs' corneal dystrophy  Comment: No pertinent findings today  Plan:  Continue to monitor annually.    (H43.812) Posterior vitreous detachment, left  Comment: Longstanding, stable symptoms  Plan:  Educated patient on signs and symptoms of retinal detachment including increase in flashes, floaters, or a change in vision. If symptoms present, return to clinic immediately.    (H25.093) Age-related incipient cataract of both eyes  Comment: Not visually signficiant  Plan:  No treatment indicated at this time. Monitor annually.    Return to clinic in 1 year for comprehensive eye exam.    Complete documentation of historical and exam elements from today's encounter can  be found in the  full encounter summary report (not reduplicated in this progress  note). I personally obtained the chief complaint(s) and history of present illness. I  confirmed and edited as necessary the review of systems, past medical/surgical  history, family history, social history, and examination findings as documented by  others; and I examined the patient myself. I personally reviewed the relevant tests,  images, and reports as documented above. I formulated and edited as necessary the  assessment and plan and discussed the findings and management plan with the  patient and family.    Avtar Sears OD, FAAO

## 2022-11-13 ENCOUNTER — OFFICE VISIT (OUTPATIENT)
Dept: FAMILY MEDICINE | Facility: CLINIC | Age: 67
End: 2022-11-13
Payer: COMMERCIAL

## 2022-11-13 VITALS
OXYGEN SATURATION: 100 % | SYSTOLIC BLOOD PRESSURE: 158 MMHG | DIASTOLIC BLOOD PRESSURE: 98 MMHG | HEART RATE: 91 BPM | TEMPERATURE: 97.5 F

## 2022-11-13 DIAGNOSIS — R21 FACIAL RASH: ICD-10-CM

## 2022-11-13 DIAGNOSIS — B02.29 HERPES ZOSTER VIRUS INFECTION OF FACE AND EAR NERVES: Primary | ICD-10-CM

## 2022-11-13 PROCEDURE — 99213 OFFICE O/P EST LOW 20 MIN: CPT

## 2022-11-13 RX ORDER — VALACYCLOVIR HYDROCHLORIDE 1 G/1
1000 TABLET, FILM COATED ORAL 3 TIMES DAILY
Qty: 21 TABLET | Refills: 0 | Status: SHIPPED | OUTPATIENT
Start: 2022-11-13 | End: 2022-12-13

## 2022-11-13 RX ORDER — GABAPENTIN 300 MG/1
300 CAPSULE ORAL 3 TIMES DAILY
Qty: 42 CAPSULE | Refills: 0 | Status: SHIPPED | OUTPATIENT
Start: 2022-11-13 | End: 2022-12-13

## 2022-11-13 ASSESSMENT — ENCOUNTER SYMPTOMS
CONSTITUTIONAL NEGATIVE: 1
RESPIRATORY NEGATIVE: 1
CARDIOVASCULAR NEGATIVE: 1

## 2022-11-13 NOTE — PROGRESS NOTES
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  The symptoms began within 72 hours and they had new lesions as early as yesterday so treatment with valacyclovir recommended.  1000 mg  times a day for 7 days.  Discussed side effects and proper administration of this medication.    The goal of antiviral therapy are to lessen the severity and duration of pain associated with acute neuritis, promote rapid healing of skin lesions, prevent new lesion formation, decrease viral shedding to reduce the risk of transmission and prevent postherpetic neuralgia.    We briefly touched on risks of developing postherpetic neuralgia.      Overall patient's symptoms are mild and pain can be treated with ibuprofen and Tylenol.  Topical lidocaine patches may be of some benefit as well.  If pain worsens patient will follow-up with primary care provider for potential gabapentin or further pain control measures.    Patient is not immunocompromised and there is no ocular involvement.    We discussed signs and symptoms that would warrant further evaluation from a health care provider. The plan of care was discussed.They understand and agree with the course of treatments. A printed summary was given including instructions and medications.      Return if symptoms worsen or fail to improve, for Follow up.    At the end of the encounter, I discussed results, diagnosis, medications. Discussed red flags for immediate return to clinic/ER, as well as indications for follow up if no improvement. Patient understood and agreed to plan. Patient was stable for discharge.    Addi Medina is a 67 year old female who presents to clinic today for the following health issues:  Chief Complaint   Patient presents  with     Urgent Care     Pt was seen a week ago for left ear pain and was put on Abx and has not improve. Has only been taking OTC pain meds with very little improvement. Possible sinus infection pt is worried about because her gum is hurting again     Derm Problem     Outbreaks on her face with red spots. And does not hurt or itch.     Carol presents with reports of left ear pain x 1 week. She was seen and given antibiotic drops. She has used this and has been taking Tylenol and Naproxen which has offered no relief. She now also has jaw pain. She noticed a rash as well on her face 3 days ago.           Review of Systems   Constitutional: Negative.    HENT: Positive for dental problem and ear discharge.    Respiratory: Negative.    Cardiovascular: Negative.    Skin: Positive for rash.           Objective    BP (!) 158/98   Pulse 91   Temp 97.5  F (36.4  C) (Tympanic)   LMP 11/30/2004   SpO2 100%   Physical Exam  Constitutional:       Appearance: Normal appearance.   HENT:      Head: Normocephalic and atraumatic.      Right Ear: Tympanic membrane, ear canal and external ear normal.      Left Ear: Tympanic membrane, ear canal and external ear normal.      Mouth/Throat:      Mouth: Mucous membranes are moist.      Pharynx: Oropharynx is clear.   Musculoskeletal:      Cervical back: Normal range of motion and neck supple.   Skin:     General: Skin is warm and dry.      Findings: Rash (left jaw) present.   Neurological:      General: No focal deficit present.      Mental Status: She is alert and oriented to person, place, and time.              Zeke Patiño PA-C

## 2022-12-07 ENCOUNTER — TELEPHONE (OUTPATIENT)
Dept: GASTROENTEROLOGY | Facility: CLINIC | Age: 67
End: 2022-12-07

## 2022-12-07 NOTE — TELEPHONE ENCOUNTER
Outbound call to: Carol Barkley    Procedure: Colonoscopy    Date, Location, and Surgeon of Procedure Cancelled: 01/10/23, [facility] [place] Laura Aviles    Ordering Provider: Monalisa    Reason for cancel (please be detailed, any staff messages or encounters to note?): provider not available - attending        Rescheduled: Y     If rescheduled:    Date: 02/17/23   Location: [facility]    Prep Resent: no(changes to prep?)   Covid Test Rescheduled: no   Note any change or update to original order/sedation:

## 2022-12-12 ASSESSMENT — ENCOUNTER SYMPTOMS
EYE PAIN: 0
FEVER: 0
HEMATURIA: 0
CHILLS: 0
HEARTBURN: 0
PALPITATIONS: 0
BREAST MASS: 0
CONSTIPATION: 1
PARESTHESIAS: 0
ARTHRALGIAS: 1
DIARRHEA: 0
DIZZINESS: 0
HEMATOCHEZIA: 0
SHORTNESS OF BREATH: 0
SORE THROAT: 0
NERVOUS/ANXIOUS: 0
HEADACHES: 0
FREQUENCY: 0
DYSURIA: 0
ABDOMINAL PAIN: 0
MYALGIAS: 0
COUGH: 0
NAUSEA: 0
JOINT SWELLING: 1
WEAKNESS: 0

## 2022-12-12 ASSESSMENT — ACTIVITIES OF DAILY LIVING (ADL): CURRENT_FUNCTION: NO ASSISTANCE NEEDED

## 2022-12-13 ENCOUNTER — ANCILLARY PROCEDURE (OUTPATIENT)
Dept: GENERAL RADIOLOGY | Facility: CLINIC | Age: 67
End: 2022-12-13
Attending: FAMILY MEDICINE
Payer: COMMERCIAL

## 2022-12-13 ENCOUNTER — OFFICE VISIT (OUTPATIENT)
Dept: FAMILY MEDICINE | Facility: CLINIC | Age: 67
End: 2022-12-13
Payer: COMMERCIAL

## 2022-12-13 VITALS
RESPIRATION RATE: 20 BRPM | TEMPERATURE: 97.9 F | WEIGHT: 185 LBS | SYSTOLIC BLOOD PRESSURE: 132 MMHG | HEART RATE: 64 BPM | BODY MASS INDEX: 30.82 KG/M2 | OXYGEN SATURATION: 99 % | DIASTOLIC BLOOD PRESSURE: 70 MMHG | HEIGHT: 65 IN

## 2022-12-13 DIAGNOSIS — Z86.0100 HISTORY OF COLONIC POLYPS: ICD-10-CM

## 2022-12-13 DIAGNOSIS — Z23 NEED FOR COVID-19 VACCINE: ICD-10-CM

## 2022-12-13 DIAGNOSIS — Z86.16 HISTORY OF COVID-19: ICD-10-CM

## 2022-12-13 DIAGNOSIS — Z82.49 FAMILY HISTORY OF ISCHEMIC HEART DISEASE (IHD): ICD-10-CM

## 2022-12-13 DIAGNOSIS — N39.3 FEMALE STRESS INCONTINENCE: ICD-10-CM

## 2022-12-13 DIAGNOSIS — Z23 NEED FOR DIPHTHERIA-TETANUS-PERTUSSIS (TDAP) VACCINE: ICD-10-CM

## 2022-12-13 DIAGNOSIS — K59.00 CONSTIPATION, UNSPECIFIED CONSTIPATION TYPE: ICD-10-CM

## 2022-12-13 DIAGNOSIS — L90.0 LICHEN SCLEROSUS: ICD-10-CM

## 2022-12-13 DIAGNOSIS — Z80.3 FAMILY HISTORY OF MALIGNANT NEOPLASM OF BREAST: ICD-10-CM

## 2022-12-13 DIAGNOSIS — I73.00 RAYNAUD'S PHENOMENON WITHOUT GANGRENE: ICD-10-CM

## 2022-12-13 DIAGNOSIS — G43.009 MIGRAINE WITHOUT AURA AND WITHOUT STATUS MIGRAINOSUS, NOT INTRACTABLE: ICD-10-CM

## 2022-12-13 DIAGNOSIS — Z86.19 HISTORY OF SHINGLES: ICD-10-CM

## 2022-12-13 DIAGNOSIS — Z23 NEED FOR PNEUMOCOCCAL VACCINE: ICD-10-CM

## 2022-12-13 DIAGNOSIS — Z80.1 FAMILY HISTORY OF LUNG CANCER: ICD-10-CM

## 2022-12-13 DIAGNOSIS — R61 NIGHT SWEATS: ICD-10-CM

## 2022-12-13 DIAGNOSIS — Z23 NEED FOR SHINGLES VACCINE: ICD-10-CM

## 2022-12-13 DIAGNOSIS — F41.1 ANXIETY STATE: ICD-10-CM

## 2022-12-13 DIAGNOSIS — Z87.09 HISTORY OF SORE THROAT: ICD-10-CM

## 2022-12-13 DIAGNOSIS — R20.0 NUMBNESS OF TOES: ICD-10-CM

## 2022-12-13 DIAGNOSIS — Z13.820 SCREENING FOR OSTEOPOROSIS: ICD-10-CM

## 2022-12-13 DIAGNOSIS — Z00.00 WELCOME TO MEDICARE PREVENTIVE VISIT: Primary | ICD-10-CM

## 2022-12-13 DIAGNOSIS — Z00.00 HEALTH CARE MAINTENANCE: ICD-10-CM

## 2022-12-13 DIAGNOSIS — Z98.890 HISTORY OF LAMINECTOMY: ICD-10-CM

## 2022-12-13 DIAGNOSIS — Z78.0 ASYMPTOMATIC MENOPAUSAL STATE: ICD-10-CM

## 2022-12-13 DIAGNOSIS — M54.6 CHRONIC LEFT-SIDED THORACIC BACK PAIN: ICD-10-CM

## 2022-12-13 DIAGNOSIS — Z87.81 PERSONAL HISTORY OF TRAUMATIC FRACTURE: ICD-10-CM

## 2022-12-13 DIAGNOSIS — I10 BENIGN ESSENTIAL HYPERTENSION: ICD-10-CM

## 2022-12-13 DIAGNOSIS — G89.29 CHRONIC LEFT-SIDED THORACIC BACK PAIN: ICD-10-CM

## 2022-12-13 DIAGNOSIS — J30.2 SEASONAL ALLERGIC RHINITIS, UNSPECIFIED TRIGGER: ICD-10-CM

## 2022-12-13 DIAGNOSIS — Z13.21 ENCOUNTER FOR VITAMIN DEFICIENCY SCREENING: ICD-10-CM

## 2022-12-13 DIAGNOSIS — E78.00 ELEVATED LDL CHOLESTEROL LEVEL: ICD-10-CM

## 2022-12-13 DIAGNOSIS — Z71.89 ADVANCED DIRECTIVES, COUNSELING/DISCUSSION: ICD-10-CM

## 2022-12-13 DIAGNOSIS — Z12.11 COLON CANCER SCREENING: ICD-10-CM

## 2022-12-13 DIAGNOSIS — Z87.19 HISTORY OF GASTROESOPHAGEAL REFLUX (GERD): ICD-10-CM

## 2022-12-13 DIAGNOSIS — R93.89 ABNORMAL CXR: ICD-10-CM

## 2022-12-13 DIAGNOSIS — H91.90 DECREASED HEARING, UNSPECIFIED LATERALITY: ICD-10-CM

## 2022-12-13 DIAGNOSIS — K57.30 DIVERTICULOSIS OF LARGE INTESTINE WITHOUT HEMORRHAGE: ICD-10-CM

## 2022-12-13 DIAGNOSIS — R06.09 DOE (DYSPNEA ON EXERTION): ICD-10-CM

## 2022-12-13 DIAGNOSIS — Z80.0 FAMILY HISTORY OF COLON CANCER: ICD-10-CM

## 2022-12-13 DIAGNOSIS — Z87.898 HISTORY OF PALPITATIONS: ICD-10-CM

## 2022-12-13 PROBLEM — M25.531 RIGHT WRIST PAIN: Status: RESOLVED | Noted: 2022-05-01 | Resolved: 2022-12-13

## 2022-12-13 PROBLEM — S66.811A EXTENSOR TENDON RUPTURE OF HAND, RIGHT, INITIAL ENCOUNTER: Status: RESOLVED | Noted: 2022-07-28 | Resolved: 2022-12-13

## 2022-12-13 PROBLEM — H00.11 CHALAZION OF RIGHT UPPER EYELID: Status: RESOLVED | Noted: 2017-03-24 | Resolved: 2022-12-13

## 2022-12-13 LAB
ALBUMIN SERPL BCG-MCNC: 4.4 G/DL (ref 3.5–5.2)
ALP SERPL-CCNC: 78 U/L (ref 35–104)
ALT SERPL W P-5'-P-CCNC: 14 U/L (ref 10–35)
ANION GAP SERPL CALCULATED.3IONS-SCNC: 14 MMOL/L (ref 7–15)
AST SERPL W P-5'-P-CCNC: 21 U/L (ref 10–35)
BASOPHILS # BLD AUTO: 0 10E3/UL (ref 0–0.2)
BASOPHILS NFR BLD AUTO: 1 %
BILIRUB SERPL-MCNC: 0.5 MG/DL
BUN SERPL-MCNC: 11.2 MG/DL (ref 8–23)
CALCIUM SERPL-MCNC: 9.1 MG/DL (ref 8.8–10.2)
CHLORIDE SERPL-SCNC: 103 MMOL/L (ref 98–107)
CHOLEST SERPL-MCNC: 228 MG/DL
CREAT SERPL-MCNC: 0.8 MG/DL (ref 0.51–0.95)
CREAT UR-MCNC: 55.9 MG/DL
DEPRECATED HCO3 PLAS-SCNC: 24 MMOL/L (ref 22–29)
EOSINOPHIL # BLD AUTO: 0.1 10E3/UL (ref 0–0.7)
EOSINOPHIL NFR BLD AUTO: 2 %
ERYTHROCYTE [DISTWIDTH] IN BLOOD BY AUTOMATED COUNT: 12.9 % (ref 10–15)
GFR SERPL CREATININE-BSD FRML MDRD: 80 ML/MIN/1.73M2
GLUCOSE SERPL-MCNC: 99 MG/DL (ref 70–99)
HCT VFR BLD AUTO: 41.7 % (ref 35–47)
HDLC SERPL-MCNC: 65 MG/DL
HGB BLD-MCNC: 13.8 G/DL (ref 11.7–15.7)
IMM GRANULOCYTES # BLD: 0 10E3/UL
IMM GRANULOCYTES NFR BLD: 0 %
LDLC SERPL CALC-MCNC: 142 MG/DL
LYMPHOCYTES # BLD AUTO: 1.5 10E3/UL (ref 0.8–5.3)
LYMPHOCYTES NFR BLD AUTO: 29 %
MCH RBC QN AUTO: 30.9 PG (ref 26.5–33)
MCHC RBC AUTO-ENTMCNC: 33.1 G/DL (ref 31.5–36.5)
MCV RBC AUTO: 94 FL (ref 78–100)
MICROALBUMIN UR-MCNC: <12 MG/L
MICROALBUMIN/CREAT UR: NORMAL MG/G{CREAT}
MONOCYTES # BLD AUTO: 0.5 10E3/UL (ref 0–1.3)
MONOCYTES NFR BLD AUTO: 10 %
NEUTROPHILS # BLD AUTO: 3.1 10E3/UL (ref 1.6–8.3)
NEUTROPHILS NFR BLD AUTO: 59 %
NONHDLC SERPL-MCNC: 163 MG/DL
PLATELET # BLD AUTO: 237 10E3/UL (ref 150–450)
POTASSIUM SERPL-SCNC: 4 MMOL/L (ref 3.4–5.3)
PROT SERPL-MCNC: 6.9 G/DL (ref 6.4–8.3)
RBC # BLD AUTO: 4.46 10E6/UL (ref 3.8–5.2)
SODIUM SERPL-SCNC: 141 MMOL/L (ref 136–145)
TRIGL SERPL-MCNC: 104 MG/DL
TSH SERPL DL<=0.005 MIU/L-ACNC: 1.34 UIU/ML (ref 0.3–4.2)
VIT B12 SERPL-MCNC: 467 PG/ML (ref 232–1245)
WBC # BLD AUTO: 5.3 10E3/UL (ref 4–11)

## 2022-12-13 PROCEDURE — 90472 IMMUNIZATION ADMIN EACH ADD: CPT | Performed by: FAMILY MEDICINE

## 2022-12-13 PROCEDURE — 80061 LIPID PANEL: CPT | Performed by: FAMILY MEDICINE

## 2022-12-13 PROCEDURE — 71046 X-RAY EXAM CHEST 2 VIEWS: CPT | Mod: TC | Performed by: RADIOLOGY

## 2022-12-13 PROCEDURE — 93000 ELECTROCARDIOGRAM COMPLETE: CPT | Performed by: FAMILY MEDICINE

## 2022-12-13 PROCEDURE — 90677 PCV20 VACCINE IM: CPT | Performed by: FAMILY MEDICINE

## 2022-12-13 PROCEDURE — 84443 ASSAY THYROID STIM HORMONE: CPT | Performed by: FAMILY MEDICINE

## 2022-12-13 PROCEDURE — G0009 ADMIN PNEUMOCOCCAL VACCINE: HCPCS | Performed by: FAMILY MEDICINE

## 2022-12-13 PROCEDURE — 2894A CBC WITH PLATELETS AND DIFFERENTIAL: CPT | Performed by: FAMILY MEDICINE

## 2022-12-13 PROCEDURE — 90715 TDAP VACCINE 7 YRS/> IM: CPT | Performed by: FAMILY MEDICINE

## 2022-12-13 PROCEDURE — 82607 VITAMIN B-12: CPT | Performed by: FAMILY MEDICINE

## 2022-12-13 PROCEDURE — G0438 PPPS, INITIAL VISIT: HCPCS | Performed by: FAMILY MEDICINE

## 2022-12-13 PROCEDURE — 80053 COMPREHEN METABOLIC PANEL: CPT | Performed by: FAMILY MEDICINE

## 2022-12-13 PROCEDURE — 36415 COLL VENOUS BLD VENIPUNCTURE: CPT | Performed by: FAMILY MEDICINE

## 2022-12-13 PROCEDURE — 85025 COMPLETE CBC W/AUTO DIFF WBC: CPT | Performed by: FAMILY MEDICINE

## 2022-12-13 PROCEDURE — 99214 OFFICE O/P EST MOD 30 MIN: CPT | Mod: 25 | Performed by: FAMILY MEDICINE

## 2022-12-13 PROCEDURE — 82043 UR ALBUMIN QUANTITATIVE: CPT | Performed by: FAMILY MEDICINE

## 2022-12-13 RX ORDER — SUMATRIPTAN 25 MG/1
25 TABLET, FILM COATED ORAL
Qty: 9 TABLET | Refills: 0 | Status: SHIPPED | OUTPATIENT
Start: 2022-12-13

## 2022-12-13 RX ORDER — SUMATRIPTAN 50 MG/1
25 TABLET, FILM COATED ORAL
COMMUNITY
End: 2022-12-13

## 2022-12-13 ASSESSMENT — ENCOUNTER SYMPTOMS
SORE THROAT: 0
DIZZINESS: 0
BREAST MASS: 0
EYE PAIN: 0
SHORTNESS OF BREATH: 0
FREQUENCY: 0
MYALGIAS: 0
CONSTIPATION: 1
HEMATURIA: 0
PALPITATIONS: 0
WEAKNESS: 0
DIARRHEA: 0
JOINT SWELLING: 1
DYSURIA: 0
ABDOMINAL PAIN: 0
HEMATOCHEZIA: 0
ARTHRALGIAS: 1
COUGH: 0
NERVOUS/ANXIOUS: 0
HEADACHES: 0
NAUSEA: 0
FEVER: 0
PARESTHESIAS: 0
CHILLS: 0
HEARTBURN: 0

## 2022-12-13 ASSESSMENT — ANXIETY QUESTIONNAIRES
GAD7 TOTAL SCORE: 0
8. IF YOU CHECKED OFF ANY PROBLEMS, HOW DIFFICULT HAVE THESE MADE IT FOR YOU TO DO YOUR WORK, TAKE CARE OF THINGS AT HOME, OR GET ALONG WITH OTHER PEOPLE?: NOT DIFFICULT AT ALL
7. FEELING AFRAID AS IF SOMETHING AWFUL MIGHT HAPPEN: NOT AT ALL
4. TROUBLE RELAXING: NOT AT ALL
GAD7 TOTAL SCORE: 0
1. FEELING NERVOUS, ANXIOUS, OR ON EDGE: NOT AT ALL
3. WORRYING TOO MUCH ABOUT DIFFERENT THINGS: NOT AT ALL
2. NOT BEING ABLE TO STOP OR CONTROL WORRYING: NOT AT ALL
IF YOU CHECKED OFF ANY PROBLEMS ON THIS QUESTIONNAIRE, HOW DIFFICULT HAVE THESE PROBLEMS MADE IT FOR YOU TO DO YOUR WORK, TAKE CARE OF THINGS AT HOME, OR GET ALONG WITH OTHER PEOPLE: NOT DIFFICULT AT ALL
5. BEING SO RESTLESS THAT IT IS HARD TO SIT STILL: NOT AT ALL
7. FEELING AFRAID AS IF SOMETHING AWFUL MIGHT HAPPEN: NOT AT ALL
6. BECOMING EASILY ANNOYED OR IRRITABLE: NOT AT ALL

## 2022-12-13 ASSESSMENT — ACTIVITIES OF DAILY LIVING (ADL): CURRENT_FUNCTION: NO ASSISTANCE NEEDED

## 2022-12-13 NOTE — RESULT ENCOUNTER NOTE
Raghav LuciaAnn-Marie Barkley,  Your chest x-ray shows scattered calcified granulomas bilaterally on both sides of the lungs that are unchanged from what was noted previously per radiologist and is suggestive of scarring an old infection.  Since there has been no change and it does not look concerning no further follow-up needed for this.  If you have any further concerns please do not hesitate to contact us by message, phone or making an appointment.  Have a good day   Dr Gonzalo MILLAN

## 2022-12-13 NOTE — RESULT ENCOUNTER NOTE
Raghav LuciaAnn-Marie Barkley,  Some of your results came back and are within acceptable limits. -Normal red blood cell (hgb) levels, normal white blood cell count and normal platelet levels.. If you have any further concerns please do not hesitate to contact us by message, phone or making an appointment.  Have a good day   Dr Gonzalo MILLAN

## 2022-12-13 NOTE — RESULT ENCOUNTER NOTE
Raghav De La Torre Filippo,  Your results came back with a normal EKG  If you have any further concerns please do not hesitate to contact us by message, phone or making an appointment.  Have a good day   Dr Gonzalo MILLAN

## 2022-12-13 NOTE — PROGRESS NOTES
"SUBJECTIVE:   Carol is a 67 year old who presents for Preventive Visit.    Patient has been advised of split billing requirements and indicates understanding: Yes  Are you in the first 12 months of your Medicare coverage?  No  Healthy Habits:     In general, how would you rate your overall health?  Good    Frequency of exercise:  4-5 days/week    Duration of exercise:  15-30 minutes    Do you usually eat at least 4 servings of fruit and vegetables a day, include whole grains    & fiber and avoid regularly eating high fat or \"junk\" foods?  Yes    Taking medications regularly:  Not Applicable    Medication side effects:  Not applicable    Ability to successfully perform activities of daily living:  No assistance needed    Home Safety:  Lack of grab bars in the bathroom    Hearing Impairment:  Difficulty following a conversation in a noisy restaurant or crowded room and difficulty understanding soft or whispered speech    In the past 6 months, have you been bothered by leaking of urine?  No    In general, how would you rate your overall mental or emotional health?  Excellent      PHQ-2 Total Score: 0    Additional concerns today:  Yes  Answers for HPI/ROS submitted by the patient on 12/12/2022  MENDEL 7 TOTAL SCORE: 0  Both eyes 20/25 rt 20/25 lt eye 20/25  Have you ever done Advance Care Planning? (For example, a Health Directive, POLST, or a discussion with a medical provider or your loved ones about your wishes): No, advance care planning information given to patient to review.  Patient plans to discuss their wishes with loved ones or provider.     Fall risk  Fallen 2 or more times in the past year?: No  Any fall with injury in the past year?: No  click delete button to remove this line now  Cognitive Screening   1) Repeat 3 items (Leader, Season, Table)    2) Clock draw: NORMAL  3) 3 item recall: Recalls 3 objects  Results: 3 items recalled: COGNITIVE IMPAIRMENT LESS LIKELY  Mini-CogTM Copyright S Ifeanyi. Licensed by " the author for use in [facility]; reprinted with permission (itzel@[facility]). All rights reserved.    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, fh of breast cancer in mom & lung cancer in sister, episodic left posterior chest pain many years, deconditioned, fh of IHD/ stroke, hx of resolved palpitations, hx of anxiety in 2016, hx of leg pain & mini-laminectomy , hx of fall wrist fracture & surgical repair & post op right extensor tendon rupture & repair, seasonal allergies, ocular rosacea, hordeolum, no longer on doxycycline, posterior vitreous disorder, raynaud's, resolved stress incontinence, hx of Covid sept 2022, shingles left jaw nov 2022, hx of colonic polyps, breast Bx, earnest, appy allergic to grass & dust,   Previously under care of PCP Dr Nair, last seen in 2019    Here for medicare wellness, new to this provider    No breast issues, hx of fibrous breasts,   Mammogram in the fall was normal  FH hx breast & Fh lung cancer . Ok with  consult    Notes has had a hard year   Broke arm April casted after tripped on grass, & fell on outstretched hand. In the process of healing noted the thumb tendon had severed and had surgery to reattach tendon in thumb, spent 7 months with some device on her right dominant hand. Not much pain. Then had Covid in sept & shingles in nov.     BP has been noted to be elevated & that is her biggest concern. Been monitoring at home since 5/2022 with automatic home machine. Range mostly 130 through 138 / 70's with some readings in the 140's/ 90's  Taken different times of the day. Father had heart attack at 61, he did not respond to clot busting drugs & was a miracle he lived given 1/3 of heart was dead tissue. Sister has HTN    Prior noted LDL, would like lipids checked.     Notes had nor virus earlier in the year  while in [place] & passed out from dehydration & fell & thought injured / cracked her rib though xray in April when seen on return was negative for any fractures. Would like me to review xray done at the time to see if any spot seen on the lungs. Her youngest sister just dx with lung cancer . Never a smoker, ? [place] exposure, [place], picked up incidentally on cxr done for preop for knee surgery. Sister had to get 2 lobes taken out of chest 3 weeks ago. She would like to make sure she doesnt have something similar as about 8 yrs ago she was noted to have a small spot when had imaging done for a chronic episodic left posterior chest pain.   Xray left rib in 2022 was normal. Lung images not really done. [facility] review notes had cxr last in  when nodular opacity noted in the lower right lung, relatively dense, indicative of a granuloma measuring 5 mm.     Chronic intermittent left side lower rib cage area pain,many years, calls it her chest pain. Episodic ,lasting few min, enough to notice it when occurs but able to keep on going. Has no symptoms currently. When occurs has no associated fever, chills, near syncope, syncope, dizziness, shortness of breath or cough or palpitations.     Winded with activity    BMI 30. Noted weight gain due inactivity this past year with everything else going on. Was unable to do her walking but hopes to get back to skiing this winter.      Hx of palpitations not currently, Coughs and goes away     Hx of anxiety, feels not an issue, declines need for counseling. Busy subbing as an organist    Would like thyroid tested as been getting night sweats & not had hot flashes since first menopausal. Mother  of breast cancer. Its at the back of her mind    Numb in left 3 toes last few months. Had a mini laminectomy 30 yrs ago. No groin anesthesia. No bladder or bowel incontinence or trouble walking.     Hx of falls, fracture, post menopausal, Would like a bone density     Has had  migraines since was a child. No difference over time. Gets about 3/ month. Not noted as much past year as had been using Excedrin, Advil, tylenol arthritis for other issues. Has not used Imitrex in a while. Given 100 mg in past would use 50 mg prn only as last resort as knows it increases risk of stroke & worsens her raynaud's when takes. Last script given in 2020 # 9. Would like a small amount of lowest baltazar to keep on hand just in case. Does not feel needs to see a neurologist.    Hx of chronic sore throat, was to have seen ENT when had to do jury duty then the pandemic began. Thought due to GERD. Was on PPI in past but feels manageable ow with dietary & lifestyle changes & using Tums as needed. Plans to return to ENT to discuss.    Hx of constipation  & Hx of colon polyps. Uses a stool softener at night,drinks Hot liquids & that helps. Colon cancer screening due & colonoscopy now rescheduled 4 times & now in feb 2023.    Decreased hearing needs audiology referral    Seasonal allergies not too bad, takes no antihistamines unless plugged, then uses nasonex once each nostril    Female stress incontinence dx noted in chart. Denies this dx, may at most feel some leakage if practicing on the organ bench for 1 hr when might use a tiny panty liner if at all.      reviewed   No ACP on file, Honoring choices given   Needs vaccine Tdap, pneumonia vaccine & will get today.   Had Covid infection in sept so will wait on booster shot  Had shingles recently in nov, has had prior zostavax & understands can be costly & will get at the pharmacy on her own.     Reviewed and updated as needed this visit by clinical staff   Tobacco  Allergies  Meds   Med Hx  Surg Hx  Fam Hx          Reviewed and updated as needed this visit by Provider   Tobacco  Allergies  Meds   Med Hx  Surg Hx  Fam Hx         Social History     Tobacco Use     Smoking status: Never     Smokeless tobacco: Never   Substance Use Topics     Alcohol use: No      Comment: 1 drinks per week     If you drink alcohol do you typically have >3 drinks per day or >7 drinks per week? No    Alcohol Use 12/13/2022   Prescreen: >3 drinks/day or >7 drinks/week? -   Prescreen: >3 drinks/day or >7 drinks/week? No     Current providers sharing in care for this patient include:   Patient Care Team:  Andrew Nair MD as PCP - General (Family Practice)  Jhony Byers MD as MD (Otolaryngology)  Jovanna Tam PA-C as Assigned Musculoskeletal Provider  Berenice Stone MD as Assigned PCP  Avtar Sears OD as Assigned Surgical Provider  Arnold Ji MD as MD (Cardiovascular Disease)  Korin Ramirez AuD as Audiologist (Audiology)    The following health maintenance items are reviewed in Epic and correct as of today:  Health Maintenance   Topic Date Due     DEXA  Never done     ZOSTER IMMUNIZATION (2 of 3) 01/29/2016     COLORECTAL CANCER SCREENING  01/08/2019     MEDICARE ANNUAL WELLNESS VISIT  03/11/2019     COVID-19 Vaccine (5 - Booster for Pfizer series) 06/17/2022     ANNUAL REVIEW OF HM ORDERS  12/13/2023     FALL RISK ASSESSMENT  12/13/2023     MAMMO SCREENING  08/30/2024     LIPID  12/13/2027     ADVANCE CARE PLANNING  12/13/2027     DTAP/TDAP/TD IMMUNIZATION (3 - Td or Tdap) 12/13/2032     HEPATITIS C SCREENING  Completed     MIGRAINE ACTION PLAN  Completed     PHQ-2 (once per calendar year)  Completed     INFLUENZA VACCINE  Completed     Pneumococcal Vaccine: 65+ Years  Completed     IPV IMMUNIZATION  Aged Out     MENINGITIS IMMUNIZATION  Aged Out     Lab work is in process  Labs reviewed in EPIC  BP Readings from Last 3 Encounters:   12/13/22 132/70   11/13/22 (!) 158/98   11/06/22 (!) 157/88    Wt Readings from Last 3 Encounters:   12/13/22 83.9 kg (185 lb)   11/06/22 84.8 kg (187 lb)   10/13/22 79.4 kg (175 lb)                  Patient Active Problem List   Diagnosis     Migraine without aura     History of colonic polyps     Raynaud phenomenon      Advanced directives, counseling/discussion     Seasonal allergic rhinitis     Elevated LDL cholesterol level     Family history of lung cancer     Family history of malignant neoplasm of breast     BMI 30.0-30.9,adult     Chronic left-sided thoracic back pain     YIP (dyspnea on exertion)     Family history of ischemic heart disease (IHD)     Night sweats     History of laminectomy     Personal history of traumatic fracture     Numbness of toes     History of gastroesophageal reflux (GERD)     Constipation, unspecified constipation type     Decreased hearing, unspecified laterality     History of COVID-19     History of shingles     Family history of colon cancer     Past Surgical History:   Procedure Laterality Date     BREAST BIOPSY, RT/LT       COLONOSCOPY  01/01/2014     COLONOSCOPY  01/08/2014    Procedure: COLONOSCOPY;  colonoscopy  ;  Surgeon: Jose Newton MD;  Location:  GI     ENT SURGERY  1964    tonsilectomy     HC REMOVAL GALLBLADDER      Cholecystectomy     HYSTERECTOMY VAGINAL  05/17/2011    w/ bilateral salphingo-oophorectomy, eterocele reapir w/ Jamie culdoplasty, anterior colporrhaphy, posterior colpoperineorrhaphy, pubovaginal sling (mini-Arc) placement, cystoscopy and suprapubic catheter palcement     HYSTERECTOMY, PAP NO LONGER INDICATED       HYSTEROSCOPY  02/01/2007    D&C     ORTHOPEDIC SURGERY  Broken wrist 2022     SOFT TISSUE SURGERY  Tendon transfer 2022     TRANSFER, TENDON FOREARM, WRIST, HAND Right 08/05/2022    Procedure: RIGHT TENDON TRANSFER EXTENSOR INDICIS PROPRIUS TO EXTENSOR POLLICIS LONGUS;  Surgeon: Geraldo Fischer MD;  [place] [facility] [place]     TUBAL LIGATION  01/01/1989     ZZC APPENDECTOMY       ZZC NARANJO W/O FACETEC FORAMOT/DSKC 1/2 VRT SEG, LUMBAR      L5-S1     ZZC STRESS ECHO (TREADMILL) FL  04/01/2006       Social History     Tobacco Use     Smoking status: Never     Smokeless tobacco: Never   Substance Use Topics     Alcohol use: No     Comment: 1 drinks per week      Family History   Problem Relation Age of Onset     Heart Disease Mother      Breast Cancer Mother         age 70 dx     Fuch's dystrophy Mother      C.A.D. Father      Neurologic Disorder Father         parkinsons     Hypertension Father      Hyperlipidemia Father      Depression Father         Parkinsons related     Anxiety Disorder Father         Parkinsons Disease     Hypertension Sister      Lung Cancer Sister      Fuch's dystrophy Sister      Cancer - colorectal Maternal Aunt      Cancer - colorectal Maternal Uncle      Cancer Paternal Uncle         bone     Other Cancer Cousin         Ovarian     Colon Cancer Other         Maternal Uncle     Diabetes Other         Child diabetes     Colon Cancer Other         Maternal Aunt         Current Outpatient Medications   Medication Sig Dispense Refill     clobetasol (TEMOVATE) 0.05 % ointment        fluocinolone (SYNALAR) 0.01 % external solution        ketoconazole (NIZORAL) 2 % external shampoo Apply topically daily as needed for itching or irritation 120 mL 6     SUMAtriptan (IMITREX) 25 MG tablet Take 1 tablet (25 mg) by mouth at onset of headache for migraine 9 tablet 0     tretinoin (RETIN-A) 0.025 % cream        Allergies   Allergen Reactions     Dust Mite Extract      Grass      Recent Labs   Lab Test 12/13/22  1104 08/02/22  1023 12/06/19  1604 11/08/19  1431 11/09/17  1332   *  --   --  129* 118*   HDL 65  --   --  51 54   TRIG 104  --   --  228* 175*   ALT 14 26  --   --  41   CR 0.80 0.77 0.80  --  0.83   GFRESTIMATED 80 84 77  --  69   GFRESTBLACK  --   --  89  --  84   POTASSIUM 4.0 3.9  --   --  3.9   TSH 1.34  --   --  1.72 1.93      Pneumonia Vaccine:  Mammogram Screening: Mammogram Screening: Recommended mammography every 1-2 years with patient discussion and risk factor consideration  Last 3 Pap and HPV Results:   PAP / HPV 12/14/2010 12/22/2006   PAP (Historical) NIL NIL       FHS-7:   Breast CA Risk Assessment (FHS-7) 8/30/2022  12/12/2022   Did any of your first-degree relatives have breast or ovarian cancer? Yes Yes   Did any of your relatives have bilateral breast cancer? No Unknown   Did any man in your family have breast cancer? No Unknown   Did any woman in your family have breast and ovarian cancer? No Unknown   Did any woman in your family have breast cancer before age 50 y? No Unknown   Do you have 2 or more relatives with breast and/or ovarian cancer? Yes Yes   Do you have 2 or more relatives with breast and/or bowel cancer? Yes Yes       Mammogram Screening: Recommended mammography every 1-2 years with patient discussion and risk factor consideration  Pertinent mammograms are reviewed under the imaging tab.    Review of Systems   Constitutional: Negative for chills and fever.   HENT: Positive for hearing loss. Negative for congestion, ear pain and sore throat.    Eyes: Negative for pain and visual disturbance.   Respiratory: Negative for cough and shortness of breath.    Cardiovascular: Positive for chest pain. Negative for palpitations and peripheral edema.   Gastrointestinal: Positive for constipation. Negative for abdominal pain, diarrhea, heartburn, hematochezia and nausea.   Breasts:  Negative for tenderness, breast mass and discharge.   Genitourinary: Negative for dysuria, frequency, genital sores, hematuria, pelvic pain, urgency, vaginal bleeding and vaginal discharge.   Musculoskeletal: Positive for arthralgias and joint swelling. Negative for myalgias.   Skin: Negative for rash.   Neurological: Negative for dizziness, weakness, headaches and paresthesias.   Psychiatric/Behavioral: Negative for mood changes. The patient is not nervous/anxious.      Constitutional, HEENT, cardiovascular, pulmonary, GI, , musculoskeletal, neuro, skin, endocrine and psych systems are negative, except as otherwise noted.    OBJECTIVE:   /70 (BP Location: Left arm, Patient Position: Sitting, Cuff Size: Adult Regular)   Pulse 64    "Temp 97.9  F (36.6  C) (Temporal)   Resp 20   Ht 1.651 m (5' 5\")   Wt 83.9 kg (185 lb)   LMP 11/30/2004   SpO2 99%   BMI 30.79 kg/m   Estimated body mass index is 30.79 kg/m  as calculated from the following:    Height as of this encounter: 1.651 m (5' 5\").    Weight as of this encounter: 83.9 kg (185 lb).  Physical Exam  GENERAL: healthy, alert, no distress and obese  EYES: Eyes grossly normal to inspection, PERRL and conjunctivae and sclerae normal, glasses  HENT: ear canals and TM's normal, nose and mouth without ulcers or lesions  NECK: no adenopathy, no asymmetry, masses, or scars and thyroid normal to palpation  RESP: lungs clear to auscultation - no rales, rhonchi or wheezes  BREAST: normal without masses, tenderness or nipple discharge and no palpable axillary masses or adenopathy, semicircular scar 2 to 10 oclokc above right areola & semicircular scar 5 to 7 o'clock left under areola area  CV: regular rate and rhythm, normal S 1 S 2, no S 3 or S 4, no murmur, click or rub, no peripheral edema and peripheral pulses strong  ABDOMEN: soft, non tender, no hepatosplenomegaly, no masses and bowel sounds normal , RUQ earnest scar  MS: no gross musculoskeletal defects noted, no edema  SKIN: no suspicious lesions or rashes, lentiginous  NEURO: Normal strength and tone, mentation intact and speech normal  PSYCH: mentation appears normal, affect normal/bright  LYMPH: no cervical, supraclavicular, axillary, or inguinal adenopathy    Diagnostic Test Results:  Labs reviewed in Epic  Results for orders placed or performed in visit on 12/13/22 (from the past 24 hour(s))   CBC with platelets and differential    Narrative    The following orders were created for panel order CBC with platelets and differential.  Procedure                               Abnormality         Status                     ---------                               -----------         ------                     CBC with platelets and d...[[id number]]  "                     Final result                 Please view results for these tests on the individual orders.   Comprehensive metabolic panel (BMP + Alb, Alk Phos, ALT, AST, Total. Bili, TP)   Result Value Ref Range    Sodium 141 136 - 145 mmol/L    Potassium 4.0 3.4 - 5.3 mmol/L    Chloride 103 98 - 107 mmol/L    Carbon Dioxide (CO2) 24 22 - 29 mmol/L    Anion Gap 14 7 - 15 mmol/L    Urea Nitrogen 11.2 8.0 - 23.0 mg/dL    Creatinine 0.80 0.51 - 0.95 mg/dL    Calcium 9.1 8.8 - 10.2 mg/dL    Glucose 99 70 - 99 mg/dL    Alkaline Phosphatase 78 35 - 104 U/L    AST 21 10 - 35 U/L    ALT 14 10 - 35 U/L    Protein Total 6.9 6.4 - 8.3 g/dL    Albumin 4.4 3.5 - 5.2 g/dL    Bilirubin Total 0.5 <=1.2 mg/dL    GFR Estimate 80 >60 mL/min/1.73m2   Lipid Profile (Chol, Trig, HDL, LDL calc)   Result Value Ref Range    Cholesterol 228 (H) <200 mg/dL    Triglycerides 104 <150 mg/dL    Direct Measure HDL 65 >=50 mg/dL    LDL Cholesterol Calculated 142 (H) <=100 mg/dL    Non HDL Cholesterol 163 (H) <130 mg/dL    Narrative    Cholesterol  Desirable:  <200 mg/dL    Triglycerides  Normal:  Less than 150 mg/dL  Borderline High:  150-199 mg/dL  High:  200-499 mg/dL  Very High:  Greater than or equal to 500 mg/dL    Direct Measure HDL  Female:  Greater than or equal to 50 mg/dL   Male:  Greater than or equal to 40 mg/dL    LDL Cholesterol  Desirable:  <100mg/dL  Above Desirable:  100-129 mg/dL   Borderline High:  130-159 mg/dL   High:  160-189 mg/dL   Very High:  >= 190 mg/dL    Non HDL Cholesterol  Desirable:  130 mg/dL  Above Desirable:  130-159 mg/dL  Borderline High:  160-189 mg/dL  High:  190-219 mg/dL  Very High:  Greater than or equal to 220 mg/dL   TSH with free T4 reflex   Result Value Ref Range    TSH 1.34 0.30 - 4.20 uIU/mL   Albumin Random Urine Quantitative with Creat Ratio   Result Value Ref Range    Albumin Urine mg/L <12.0 mg/L    Albumin Urine mg/g Cr      Creatinine Urine mg/dL 55.9 mg/dL   Vitamin B12   Result Value Ref  Range    Vitamin B12 467 232 - 1,245 pg/mL   CBC with platelets and differential   Result Value Ref Range    WBC Count 5.3 4.0 - 11.0 10e3/uL    RBC Count 4.46 3.80 - 5.20 10e6/uL    Hemoglobin 13.8 11.7 - 15.7 g/dL    Hematocrit 41.7 35.0 - 47.0 %    MCV 94 78 - 100 fL    MCH 30.9 26.5 - 33.0 pg    MCHC 33.1 31.5 - 36.5 g/dL    RDW 12.9 10.0 - 15.0 %    Platelet Count 237 150 - 450 10e3/uL    % Neutrophils 59 %    % Lymphocytes 29 %    % Monocytes 10 %    % Eosinophils 2 %    % Basophils 1 %    % Immature Granulocytes 0 %    Absolute Neutrophils 3.1 1.6 - 8.3 10e3/uL    Absolute Lymphocytes 1.5 0.8 - 5.3 10e3/uL    Absolute Monocytes 0.5 0.0 - 1.3 10e3/uL    Absolute Eosinophils 0.1 0.0 - 0.7 10e3/uL    Absolute Basophils 0.0 0.0 - 0.2 10e3/uL    Absolute Immature Granulocytes 0.0 <=0.4 10e3/uL     Results for orders placed or performed in visit on 12/13/22   XR Chest 2 Views     Status: None    Narrative    XR CHEST 2 VIEWS  12/13/2022 11:12 AM       INDICATION: Family history of lung cancer, abnormal chest x-ray  COMPARISON: 4/19/2022       Impression    IMPRESSION: Scattered calcified granulomas bilaterally are unchanged  from previous. No acute infiltrate or significant change.    ANGELA LEWIS MD         SYSTEM ID:  [id number]   Results for orders placed or performed in visit on 12/13/22   Comprehensive metabolic panel (BMP + Alb, Alk Phos, ALT, AST, Total. Bili, TP)     Status: Normal   Result Value Ref Range    Sodium 141 136 - 145 mmol/L    Potassium 4.0 3.4 - 5.3 mmol/L    Chloride 103 98 - 107 mmol/L    Carbon Dioxide (CO2) 24 22 - 29 mmol/L    Anion Gap 14 7 - 15 mmol/L    Urea Nitrogen 11.2 8.0 - 23.0 mg/dL    Creatinine 0.80 0.51 - 0.95 mg/dL    Calcium 9.1 8.8 - 10.2 mg/dL    Glucose 99 70 - 99 mg/dL    Alkaline Phosphatase 78 35 - 104 U/L    AST 21 10 - 35 U/L    ALT 14 10 - 35 U/L    Protein Total 6.9 6.4 - 8.3 g/dL    Albumin 4.4 3.5 - 5.2 g/dL    Bilirubin Total 0.5 <=1.2 mg/dL    GFR Estimate 80  >60 mL/min/1.73m2   Lipid Profile (Chol, Trig, HDL, LDL calc)     Status: Abnormal   Result Value Ref Range    Cholesterol 228 (H) <200 mg/dL    Triglycerides 104 <150 mg/dL    Direct Measure HDL 65 >=50 mg/dL    LDL Cholesterol Calculated 142 (H) <=100 mg/dL    Non HDL Cholesterol 163 (H) <130 mg/dL    Narrative    Cholesterol  Desirable:  <200 mg/dL    Triglycerides  Normal:  Less than 150 mg/dL  Borderline High:  150-199 mg/dL  High:  200-499 mg/dL  Very High:  Greater than or equal to 500 mg/dL    Direct Measure HDL  Female:  Greater than or equal to 50 mg/dL   Male:  Greater than or equal to 40 mg/dL    LDL Cholesterol  Desirable:  <100mg/dL  Above Desirable:  100-129 mg/dL   Borderline High:  130-159 mg/dL   High:  160-189 mg/dL   Very High:  >= 190 mg/dL    Non HDL Cholesterol  Desirable:  130 mg/dL  Above Desirable:  130-159 mg/dL  Borderline High:  160-189 mg/dL  High:  190-219 mg/dL  Very High:  Greater than or equal to 220 mg/dL   TSH with free T4 reflex     Status: Normal   Result Value Ref Range    TSH 1.34 0.30 - 4.20 uIU/mL   Albumin Random Urine Quantitative with Creat Ratio     Status: None   Result Value Ref Range    Albumin Urine mg/L <12.0 mg/L    Albumin Urine mg/g Cr      Creatinine Urine mg/dL 55.9 mg/dL   Vitamin B12     Status: Normal   Result Value Ref Range    Vitamin B12 467 232 - 1,245 pg/mL   CBC with platelets and differential     Status: None   Result Value Ref Range    WBC Count 5.3 4.0 - 11.0 10e3/uL    RBC Count 4.46 3.80 - 5.20 10e6/uL    Hemoglobin 13.8 11.7 - 15.7 g/dL    Hematocrit 41.7 35.0 - 47.0 %    MCV 94 78 - 100 fL    MCH 30.9 26.5 - 33.0 pg    MCHC 33.1 31.5 - 36.5 g/dL    RDW 12.9 10.0 - 15.0 %    Platelet Count 237 150 - 450 10e3/uL    % Neutrophils 59 %    % Lymphocytes 29 %    % Monocytes 10 %    % Eosinophils 2 %    % Basophils 1 %    % Immature Granulocytes 0 %    Absolute Neutrophils 3.1 1.6 - 8.3 10e3/uL    Absolute Lymphocytes 1.5 0.8 - 5.3 10e3/uL    Absolute  Monocytes 0.5 0.0 - 1.3 10e3/uL    Absolute Eosinophils 0.1 0.0 - 0.7 10e3/uL    Absolute Basophils 0.0 0.0 - 0.2 10e3/uL    Absolute Immature Granulocytes 0.0 <=0.4 10e3/uL   CBC with platelets and differential     Status: None    Narrative    The following orders were created for panel order CBC with platelets and differential.  Procedure                               Abnormality         Status                     ---------                               -----------         ------                     CBC with platelets and d...[[id number]]                      Final result                 Please view results for these tests on the individual orders.       ASSESSMENT

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
## 2022-12-14 ENCOUNTER — OFFICE VISIT (OUTPATIENT)
Dept: CARDIOLOGY | Facility: CLINIC | Age: 67
End: 2022-12-14
Attending: FAMILY MEDICINE
Payer: COMMERCIAL

## 2022-12-14 VITALS
OXYGEN SATURATION: 99 % | HEIGHT: 65 IN | WEIGHT: 186.4 LBS | SYSTOLIC BLOOD PRESSURE: 178 MMHG | HEART RATE: 78 BPM | BODY MASS INDEX: 31.06 KG/M2 | DIASTOLIC BLOOD PRESSURE: 102 MMHG

## 2022-12-14 DIAGNOSIS — I10 BENIGN ESSENTIAL HYPERTENSION: ICD-10-CM

## 2022-12-14 DIAGNOSIS — R06.09 DOE (DYSPNEA ON EXERTION): ICD-10-CM

## 2022-12-14 DIAGNOSIS — G89.29 CHRONIC LEFT-SIDED THORACIC BACK PAIN: ICD-10-CM

## 2022-12-14 DIAGNOSIS — E78.00 ELEVATED LDL CHOLESTEROL LEVEL: ICD-10-CM

## 2022-12-14 DIAGNOSIS — Z82.49 FAMILY HISTORY OF ISCHEMIC HEART DISEASE (IHD): ICD-10-CM

## 2022-12-14 DIAGNOSIS — M54.6 CHRONIC LEFT-SIDED THORACIC BACK PAIN: ICD-10-CM

## 2022-12-14 PROBLEM — Z80.0 FAMILY HISTORY OF COLON CANCER: Status: ACTIVE | Noted: 2022-12-14

## 2022-12-14 PROCEDURE — 99205 OFFICE O/P NEW HI 60 MIN: CPT | Performed by: INTERNAL MEDICINE

## 2022-12-14 PROCEDURE — G0463 HOSPITAL OUTPT CLINIC VISIT: HCPCS

## 2022-12-14 RX ORDER — LOSARTAN POTASSIUM 100 MG/1
100 TABLET ORAL DAILY
Qty: 90 TABLET | Refills: 11 | Status: SHIPPED | OUTPATIENT
Start: 2022-12-14 | End: 2024-03-13

## 2022-12-14 RX ORDER — ROSUVASTATIN CALCIUM 20 MG/1
20 TABLET, COATED ORAL DAILY
Qty: 90 TABLET | Refills: 11 | Status: SHIPPED | OUTPATIENT
Start: 2022-12-14 | End: 2024-03-14

## 2022-12-14 RX ORDER — FLUTICASONE PROPIONATE 50 MCG
1 SPRAY, SUSPENSION (ML) NASAL DAILY
COMMUNITY

## 2022-12-14 ASSESSMENT — PAIN SCALES - GENERAL: PAINLEVEL: NO PAIN (0)

## 2022-12-14 NOTE — RESULT ENCOUNTER NOTE
Raghav Ms. Barkley,  Some of your results came back showing  Normal microalbumin ( protein in urine) indicates no effect of BP on the kidneys  The 10-year ASCVD risk score (Tommie DK, et al., 2019) is: 8.2%    Values used to calculate the score:      Age: 67 years      Sex: Female      Is Non- : No      Diabetic: No      Tobacco smoker: No      Systolic Blood Pressure: 132 mmHg      Is BP treated: No      HDL Cholesterol: 65 mg/dL      Total Cholesterol: 228 mg/dL  -LDL(bad) cholesterol level is elevated which can mildly increase your heart disease risk.  A diet high in fat and simple carbohydrates, genetics and being overweight can contribute to this. ADVISE: exercising 150 minutes of aerobic exercise per week (30 minutes for 5 days per week or 50 minutes for 3 days per week are options) and eating a low saturated fat/low carbohydrate diet are helpful to improve this.  . If you have any further concerns please do not hesitate to contact us by message, phone or making an appointment.  Have a good day   Dr Gonzalo MILLAN

## 2022-12-14 NOTE — PROGRESS NOTES
Reason for Visit:  Today I have seen Carol Barkley for hypertension  Consult: Yes    HPI : Status / Symptoms / Concerns     67 year old female with a history of HTN and HLD who presents today for consultation. In clinic, her BP is 178/102. She does keep a log of her BP at home and the SBP is on average 135-140mmHg.  Carol is also aware that her cholesterol was elevated in the past. She denies CP or shortness of breath. In general, she does maintain a healthy lifestyle and maintains a diet high in vegetables and low in red meats.    Cardiovascular risk factor profile:   (+) HTN, (-) DM, (+) hypercholesterolemia, (-)  prior tobacco use, (+) fam Hx premature CAD.     Chest Pain:   No  Shortness of Breath:  No  Ankle Swelling:  No  Muscle Aches:  No  Palpitations:   No    Lightheadedness:  No  Fainting:   No  Medication Issues:  No  Recent Test:  No    Past Medical History:   Diagnosis Date     Allergic rhinitis due to other allergen     Immunotherapy     Anxiety state 4/17/2006    Problem list name updated by automated process. Provider to review     Arthritis 2015     Chalazion of right upper eyelid 03/24/2017     CKD (chronic kidney disease) stage 2, GFR 60-89 ml/min      Cough     seen by Dr Freire and ENT, CXR NL     Cystourethrocele      Disturbance of skin sensation 04/17/2006    discussed possible nerve compression with L arm numbness & L upper back pain     Extensor tendon rupture of hand, right, initial encounter 07/28/2022    Added automatically from request for surgery [id number]     Female stress incontinence 11/9/2017     GERD (gastroesophageal reflux disease) 01/09/2012     Hearing loss      Hypertension 2021     Lichen sclerosus et atrophicus of the vulva     [place]     Migraine without aura, without mention of intractable migraine without mention of status migrainosus      Migraine without status migrainosus, not intractable, unspecified migraine type 12/04/2015     PAC (premature atrial contraction) 12/2009     Holter     Pain in joint, lower leg 2011     Palpitations 2006     Personal history of colonic polyps      Pure hypercholesterolemia      PVD (posterior vitreous detachment)      Rectocele      Right wrist pain 2022     Unspecified sinusitis (chronic)      Urine, incontinence, stress female       Past Surgical History:   Procedure Laterality Date     BREAST BIOPSY, RT/LT       COLONOSCOPY  2014     COLONOSCOPY  2014    Procedure: COLONOSCOPY;  colonoscopy  ;  Surgeon: Jose Newton MD;  Location:  GI     ENT SURGERY  1964    tonsilectomy     HC REMOVAL GALLBLADDER      Cholecystectomy     HYSTERECTOMY VAGINAL  2011    w/ bilateral [facility], [facility], anterior colporrhaphy, posterior colpoperineorrhaphy, pubovaginal sling (mini-Arc) placement, cystoscopy and suprapubic catheter palcement     HYSTERECTOMY, PAP NO LONGER INDICATED       HYSTEROSCOPY  2007    D&C     ORTHOPEDIC SURGERY  Broken wrist      SOFT TISSUE SURGERY  Tendon transfer      TRANSFER, TENDON FOREARM, WRIST, HAND Right 2022    Procedure: RIGHT TENDON TRANSFER EXTENSOR INDICIS PROPRIUS TO EXTENSOR POLLICIS LONGUS;  Surgeon: Geraldo Fischer MD;  Location: [facility] [place]     TUBAL LIGATION  1989     ZZC APPENDECTOMY       ZZC NARANJO W/O FACETEC FORAMOT/DSKC  VRT SEG, LUMBAR      L5-S1     ZZC STRESS ECHO (TREADMILL) FL  2006        Other Systems:  Resp - / GI - /MS - /Rodney - /Psy - /Derm - /Hem - / - /Lymph - /ENT -/ Endo -  No other pertinent concern in systems review.     Social History: reports that she has never smoked. She has never used smokeless tobacco. She reports that she does not drink alcohol and does not use drugs.   I have reviewed this patient's social history and updated it with pertinent information if needed.    Family History:  She indicated that her mother is . She indicated that her father is . She  "indicated that both of her sisters are alive. She indicated that her brother is alive. She indicated that both of her daughters are alive. She indicated that her son is alive. She indicated that the status of her maternal aunt is unknown. She indicated that the status of her maternal uncle is unknown. She indicated that the status of her paternal uncle is unknown. She indicated that the status of her cousin is unknown.     Family History   Problem Relation Age of Onset     Heart Disease Mother      Breast Cancer Mother         age 70 dx     Fuch's dystrophy Mother      C.A.D. Father      Neurologic Disorder Father         parkinsons     Hypertension Father      Hyperlipidemia Father      Depression Father         Parkinsons related     Anxiety Disorder Father         Parkinsons Disease     Hypertension Sister      Lung Cancer Sister      Fuch's dystrophy Sister      Cancer - colorectal Maternal Aunt      Cancer - colorectal Maternal Uncle      Cancer Paternal Uncle         bone     Other Cancer Cousin         Ovarian     Colon Cancer Other         Maternal Uncle     Diabetes Other         Child diabetes     Colon Cancer Other         Maternal Aunt   Father had heart attack at 61    Medications:  Current Outpatient Medications   Medication     clobetasol (TEMOVATE) 0.05 % ointment     fluticasone (FLONASE) 50 MCG/ACT nasal spray     ketoconazole (NIZORAL) 2 % external shampoo     SUMAtriptan (IMITREX) 25 MG tablet     tretinoin (RETIN-A) 0.025 % cream     fluocinolone (SYNALAR) 0.01 % external solution     No current facility-administered medications for this visit.      Exam:  BP (!) 178/102 (BP Location: Right arm, Patient Position: Chair, Cuff Size: Adult Regular)   Pulse 78   Ht 1.655 m (5' 5.16\")   Wt 84.6 kg (186 lb 6.4 oz)   LMP 11/30/2004   SpO2 99%   BMI 30.87 kg/m     Body mass index is 30.87 kg/m .   General:  Alert, oriented, no acute distress  Eyes:  External exam wade  Mouth:  Moist " mucosa  Ears:  Hearing grossly intact  Neck:  No thyromegaly. Carotid upstroke normal, no carotid bruit, no JVD  Lungs:  Clear to auscultation. No wheezes, crackles, rales or rhonchi,      no accessory muscle use   Heart:  Regular, normal S1 and S2, no obvious murmurs, no rubs or gallops  Abdomen: Soft, non-tender  Extremities: Trivial ankle edema, age appropriate skin without stasis  Pulses: Pedal 2+ bilaterally  Rodney/Psy: Non-focal, normal mood, normal affect      Vital Trend:  Wt Readings from Last 5 Encounters:   12/14/22 84.6 kg (186 lb 6.4 oz)   12/13/22 83.9 kg (185 lb)   11/06/22 84.8 kg (187 lb)   10/13/22 79.4 kg (175 lb)   08/05/22 82.6 kg (182 lb)     BP Readings from Last 5 Encounters:   12/14/22 (!) 178/102   12/13/22 132/70   11/13/22 (!) 158/98   11/06/22 (!) 157/88   08/05/22 134/85     Pulse Readings from Last 5 Encounters:   12/14/22 78   12/13/22 64   11/13/22 91   11/06/22 75   08/05/22 68        Data:   ECG (2022):  NSR    Echocardiogram (2013):   Normal stress echocardiogram. No stress induced regional wall motion     Stress TTE 12/14/2016  Interpretation Summary  This was a normal stress echocardiogram with no evidence of stress-induced  ischemia.  The patient exhibited chest pain during exercise.  There was a maximum 1.5mm ST segment depression in the inferior lead(s).  There was a maximum 1.5mm ST segment depression in the lateral lead(s).    CT Chest in 3/2014 without significant coronary calcifications    Lab Review:  Lab Results   Component Value Date    CR 0.80 12/13/2022    CR 0.77 08/02/2022    CR 0.80 12/06/2019    CR 0.83 11/09/2017    CR 0.76 12/13/2016     (H) 12/13/2022     (H) 11/08/2019     (H) 11/09/2017    HDL 65 12/13/2022    HDL 51 11/08/2019    HDL 54 11/09/2017    POTASSIUM 4.0 12/13/2022    POTASSIUM 3.9 08/02/2022    POTASSIUM 3.9 11/09/2017     12/13/2022     08/02/2022     11/09/2017         Assessment:     Carol hall a 67  year old female with moderate hypertension and dyslipedemia. We had a long discussion regarding initiating blood pressure medications for a systolic blood pressure goal of 120mmHg. She was agreeable to initiating losartan. Given her current LDL and cholesterol, we discussed initiating statin therapy and she was agreeable to starting a high potency statin. We also had a significant discussion regarding heart healthy life style and weight loss. Specifically we discussed time restricted eating and heart healthy diet high in vegetables.  If she should require second antihypertensive agent I would recommend a diuretic.    Plan:     1. HTN   - Losartan 100mg daily     2. Dyslipedemia   - Rosuvastatin 20mg daily    Contingency Plan: If symptomatic from losartan medication, instructed to split losartan in half, in case this is not sufficient and BP still >130 SBP. can add HCTZ or chlorthalidone 12.5 mg    Follow-up:PRN    I spent 60min for the chart preparation, visit and documentation   This note was software transcribed.

## 2022-12-14 NOTE — RESULT ENCOUNTER NOTE
Raghav Ms. Barkley,  Some of your results came back and are within acceptable limits. -Liver and gallbladder tests are normal (ALT,AST, Alk phos, bilirubin), kidney function is normal (Cr, GFR), sodium is normal, potassium is normal, calcium is normal, glucose is normal.  -TSH (thyroid stimulating hormone) level is normal which indicates normal thyroid function.  - normal b12. If you have any further concerns please do not hesitate to contact us by message, phone or making an appointment.  Have a good day   Dr Gonzalo MILLAN

## 2022-12-14 NOTE — NURSING NOTE
Chief Complaint   Patient presents with     New Patient     Patient is referred by Samantha Sánchez MD for cardiac evaluation related to history of:  I10 (ICD-10-CM) - Benign essential hypertension  E78.00 (ICD-10-CM) - Elevated LDL cholesterol level  M54.6, G89.29 (ICD-10-CM) - Chronic left-sided thoracic back pain  R06.09 (ICD-10-CM) - YIP (dyspnea on exertion)  Z82.49 (ICD-10-CM) - Family history of ischemic heart disease (IHD)         Vitals were taken and medications reconciled.    Oli Bryson, EMT  10:31 AM

## 2022-12-14 NOTE — LETTER
12/14/2022      RE: Carol Barkley  [address]  [facility] [id number]       Reason for Visit:  Today I have seen Carol Barkley for hypertension  Consult: Yes    HPI : Status / Symptoms / Concerns     67 year old female with a history of HTN and HLD who presents today for consultation. In clinic, her BP is 178/102. She does keep a log of her BP at home and the SBP is on average 135-140mmHg.  Carol is also aware that her cholesterol was elevated in the past. She denies CP or shortness of breath. In general, she does maintain a healthy lifestyle and maintains a diet high in vegetables and low in red meats.    Cardiovascular risk factor profile:   (+) HTN, (-) DM, (+) hypercholesterolemia, (-)  prior tobacco use, (+) fam Hx premature CAD.     Chest Pain:   No  Shortness of Breath:  No  Ankle Swelling:  No  Muscle Aches:  No  Palpitations:   No    Lightheadedness:  No  Fainting:   No  Medication Issues:  No  Recent Test:  No    Past Medical History:   Diagnosis Date     Allergic rhinitis due to other allergen     Immunotherapy     Anxiety state 4/17/2006    Problem list name updated by automated process. Provider to review     Arthritis 2015     Chalazion of right upper eyelid 03/24/2017     CKD (chronic kidney disease) stage 2, GFR 60-89 ml/min      Cough     seen by Dr Freire and ENT, CXR NL     Cystourethrocele      Disturbance of skin sensation 04/17/2006    discussed possible nerve compression with L arm numbness & L upper back pain     Extensor tendon rupture of hand, right, initial encounter 07/28/2022    Added automatically from request for surgery [id number]     Female stress incontinence 11/9/2017     GERD (gastroesophageal reflux disease) 01/09/2012     Hearing loss      Hypertension 2021     Lichen sclerosus et atrophicus of the vulva     [place]     Migraine without aura, without mention of intractable migraine without mention of status migrainosus      Migraine without status migrainosus, not intractable,  unspecified migraine type 12/04/2015     PAC (premature atrial contraction) 12/2009    Holter     Pain in joint, lower leg 12/19/2011     Palpitations 05/17/2006     Personal history of colonic polyps      Pure hypercholesterolemia      PVD (posterior vitreous detachment)      Rectocele      Right wrist pain 05/01/2022     Unspecified sinusitis (chronic)      Urine, incontinence, stress female       Past Surgical History:   Procedure Laterality Date     BREAST BIOPSY, RT/LT       COLONOSCOPY  01/01/2014     COLONOSCOPY  01/08/2014    Procedure: COLONOSCOPY;  colonoscopy  ;  Surgeon: Jose Newton MD;  Location:  GI     ENT SURGERY  1964    tonsilectomy     HC REMOVAL GALLBLADDER      Cholecystectomy     HYSTERECTOMY VAGINAL  05/17/2011    w/ bilateral [facility]ophorectomy[facility], anterior colporrhaphy, posterior colpoperineorrhaphy, pubovaginal sling (mini-Arc) placement, cystoscopy and suprapubic catheter palcement     HYSTERECTOMY, PAP NO LONGER INDICATED       HYSTEROSCOPY  02/01/2007    D&C     ORTHOPEDIC SURGERY  Broken wrist 2022     SOFT TISSUE SURGERY  Tendon transfer 2022     TRANSFER, TENDON FOREARM, WRIST, HAND Right 08/05/2022    Procedure: RIGHT TENDON TRANSFER EXTENSOR INDICIS PROPRIUS TO EXTENSOR POLLICIS LONGUS;  Surgeon: Geraldo Fischer MD;  [place] [facility] [place]     TUBAL LIGATION  01/01/1989     ZZC APPENDECTOMY       ZZC NARANJO W/O FACETEC FORAMOT/[facility] 1/2 VRT SEG, LUMBAR      L5-S1     ZZC STRESS ECHO (TREADMILL) [place]  04/01/2006        Other Systems:  Resp - / GI - /MS - /Rodney - /Psy - /Derm - /Hem - / - /Lymph - /ENT -/ Endo -  No other pertinent concern in systems review.     Social History: reports that she has never smoked. She has never used smokeless tobacco. She reports that she does not drink alcohol and does not use drugs.   I have reviewed this patient's social history and updated it with pertinent information if needed.    Family History:  She  "indicated that her mother is . She indicated that her father is . She indicated that both of her sisters are alive. She indicated that her brother is alive. She indicated that both of her daughters are alive. She indicated that her son is alive. She indicated that the status of her maternal aunt is unknown. She indicated that the status of her maternal uncle is unknown. She indicated that the status of her paternal uncle is unknown. She indicated that the status of her cousin is unknown.     Family History   Problem Relation Age of Onset     Heart Disease Mother      Breast Cancer Mother         age 70 dx     Fuch's dystrophy Mother      C.A.D. Father      Neurologic Disorder Father         parkinsons     Hypertension Father      Hyperlipidemia Father      Depression Father         Parkinsons related     Anxiety Disorder Father         Parkinsons Disease     Hypertension Sister      Lung Cancer Sister      Fuch's dystrophy Sister      Cancer - colorectal Maternal Aunt      Cancer - colorectal Maternal Uncle      Cancer Paternal Uncle         bone     Other Cancer Cousin         Ovarian     Colon Cancer Other         Maternal Uncle     Diabetes Other         Child diabetes     Colon Cancer Other         Maternal Aunt   Father had heart attack at 61    Medications:  Current Outpatient Medications   Medication     clobetasol (TEMOVATE) 0.05 % ointment     fluticasone (FLONASE) 50 MCG/ACT nasal spray     ketoconazole (NIZORAL) 2 % external shampoo     SUMAtriptan (IMITREX) 25 MG tablet     tretinoin (RETIN-A) 0.025 % cream     fluocinolone (SYNALAR) 0.01 % external solution     No current facility-administered medications for this visit.      Exam:  BP (!) 178/102 (BP Location: Right arm, Patient Position: Chair, Cuff Size: Adult Regular)   Pulse 78   Ht 1.655 m (5' 5.16\")   Wt 84.6 kg (186 lb 6.4 oz)   LMP 2004   SpO2 99%   BMI 30.87 kg/m     Body mass index is 30.87 kg/m .   General: "  Alert, oriented, no acute distress  Eyes:  External exam wade  Mouth:  Moist mucosa  Ears:  Hearing grossly intact  Neck:  No thyromegaly. Carotid upstroke normal, no carotid bruit, no JVD  Lungs:  Clear to auscultation. No wheezes, crackles, rales or rhonchi,      no accessory muscle use   Heart:  Regular, normal S1 and S2, no obvious murmurs, no rubs or gallops  Abdomen: Soft, non-tender  Extremities: Trivial ankle edema, age appropriate skin without stasis  Pulses: Pedal 2+ bilaterally  Rodney/Psy: Non-focal, normal mood, normal affect      Vital Trend:  Wt Readings from Last 5 Encounters:   12/14/22 84.6 kg (186 lb 6.4 oz)   12/13/22 83.9 kg (185 lb)   11/06/22 84.8 kg (187 lb)   10/13/22 79.4 kg (175 lb)   08/05/22 82.6 kg (182 lb)     BP Readings from Last 5 Encounters:   12/14/22 (!) 178/102   12/13/22 132/70   11/13/22 (!) 158/98   11/06/22 (!) 157/88   08/05/22 134/85     Pulse Readings from Last 5 Encounters:   12/14/22 78   12/13/22 64   11/13/22 91   11/06/22 75   08/05/22 68        Data:   ECG (2022):  NSR    Echocardiogram (2013):   Normal stress echocardiogram. No stress induced regional wall motion     Stress TTE 12/14/2016  Interpretation Summary  This was a normal stress echocardiogram with no evidence of stress-induced  ischemia.  The patient exhibited chest pain during exercise.  There was a maximum 1.5mm ST segment depression in the inferior lead(s).  There was a maximum 1.5mm ST segment depression in the lateral lead(s).    CT Chest in 3/2014 without significant coronary calcifications    Lab Review:  Lab Results   Component Value Date    CR 0.80 12/13/2022    CR 0.77 08/02/2022    CR 0.80 12/06/2019    CR 0.83 11/09/2017    CR 0.76 12/13/2016     (H) 12/13/2022     (H) 11/08/2019     (H) 11/09/2017    HDL 65 12/13/2022    HDL 51 11/08/2019    HDL 54 11/09/2017    POTASSIUM 4.0 12/13/2022    POTASSIUM 3.9 08/02/2022    POTASSIUM 3.9 11/09/2017     12/13/2022      08/02/2022     11/09/2017         Assessment:     Carol Barkley is a 67 year old female with moderate hypertension and dyslipedemia. We had a long discussion regarding initiating blood pressure medications for a systolic blood pressure goal of 120mmHg. She was agreeable to initiating losartan. Given her current LDL and cholesterol, we discussed initiating statin therapy and she was agreeable to starting a high potency statin. We also had a significant discussion regarding heart healthy life style and weight loss. Specifically we discussed time restricted eating and heart healthy diet high in vegetables.  If she should require second antihypertensive agent I would recommend a diuretic.    Plan:     1. HTN   - Losartan 100mg daily     2. Dyslipedemia   - Rosuvastatin 20mg daily    Contingency Plan: If symptomatic from losartan medication, instructed to split losartan in half, in case this is not sufficient and BP still >130 SBP. can add HCTZ or chlorthalidone 12.5 mg    Follow-up:PRN    I spent 60min for the chart preparation, visit and documentation   This note was software transcribed.      Arnold Ji MD

## 2022-12-14 NOTE — LETTER
12/14/2022      RE: Carol Barkley  [address]  [facility] [place] [address]       Dear Colleague,    Thank you for the opportunity to participate in the care of your patient, Carol Barkley, at the [facility] [place] [facility]. Please see a copy of my visit note below.    Reason for Visit:  Today I have seen Carol Barkley for hypertension  Consult: Yes    HPI : Status / Symptoms / Concerns     67 year old female with a history of HTN and HLD who presents today for consultation. In clinic, her BP is 178/102. She does keep a log of her BP at home and the SBP is on average 135-140mmHg.  Carol is also aware that her cholesterol was elevated in the past. She denies CP or shortness of breath. In general, she does maintain a healthy lifestyle and maintains a diet high in vegetables and low in red meats.    Cardiovascular risk factor profile:   (+) HTN, (-) DM, (+) hypercholesterolemia, (-)  prior tobacco use, (+) fam Hx premature CAD.     Chest Pain:   No  Shortness of Breath:  No  Ankle Swelling:  No  Muscle Aches:  No  Palpitations:   No    Lightheadedness:  No  Fainting:   No  Medication Issues:  No  Recent Test:  No    Past Medical History:   Diagnosis Date     Allergic rhinitis due to other allergen     Immunotherapy     Anxiety state 4/17/2006    Problem list name updated by automated process. Provider to review     Arthritis 2015     Chalazion of right upper eyelid 03/24/2017     CKD (chronic kidney disease) stage 2, GFR 60-89 ml/min      Cough     seen by Dr Freire and ENT, CXR NL     Cystourethrocele      Disturbance of skin sensation 04/17/2006    discussed possible nerve compression with L arm numbness & L upper back pain     Extensor tendon rupture of hand, right, initial encounter 07/28/2022    Added automatically from request for surgery [id number]     Female stress incontinence 11/9/2017     GERD (gastroesophageal reflux disease) 01/09/2012      Hearing loss      Hypertension 2021     Lichen sclerosus et atrophicus of the vulva     [place]     Migraine without aura, without mention of intractable migraine without mention of status migrainosus      Migraine without status migrainosus, not intractable, unspecified migraine type 12/04/2015     PAC (premature atrial contraction) 12/2009    [place]     Pain in joint, lower leg 12/19/2011     Palpitations 05/17/2006     Personal history of colonic polyps      Pure hypercholesterolemia      PVD (posterior vitreous detachment)      Rectocele      Right wrist pain 05/01/2022     Unspecified sinusitis (chronic)      Urine, incontinence, stress female       Past Surgical History:   Procedure Laterality Date     BREAST BIOPSY, RT/LT       COLONOSCOPY  01/01/2014     COLONOSCOPY  01/08/2014    Procedure: COLONOSCOPY;  colonoscopy  ;  Surgeon: Jose Newton MD;  Location:  GI     ENT SURGERY  1964    tonsilectomy     HC REMOVAL GALLBLADDER      Cholecystectomy     HYSTERECTOMY VAGINAL  05/17/2011    w/ bilateral salphingo-oophorectomy, eterocele reapir w/ Jamie culdoplasty, anterior colporrhaphy, posterior colpoperineorrhaphy, pubovaginal sling (mini-Arc) placement, cystoscopy and suprapubic catheter palcement     HYSTERECTOMY, PAP NO LONGER INDICATED       HYSTEROSCOPY  02/01/2007    D&C     ORTHOPEDIC SURGERY  Broken wrist 2022     SOFT TISSUE SURGERY  Tendon transfer 2022     TRANSFER, TENDON FOREARM, WRIST, HAND Right 08/05/2022    Procedure: RIGHT TENDON TRANSFER EXTENSOR INDICIS PROPRIUS TO EXTENSOR POLLICIS LONGUS;  Surgeon: Geraldo Fischer MD;  Location: [facility] [place] OR     TUBAL LIGATION  01/01/1989     ZZC APPENDECTOMY       ZZC NARANJO W/O FACETEC FORAMOT/DSKC 1/2 VRT SEG, LUMBAR      L5-S1     ZZC STRESS ECHO (TREADMILL) FL  04/01/2006        Other Systems:  Resp - / GI - /MS - /Rodney - /Psy - /Derm - /Hem - / - /Lymph - /ENT -/ Endo -  No other pertinent concern in systems review.     Social History: reports that  she has never smoked. She has never used smokeless tobacco. She reports that she does not drink alcohol and does not use drugs.   I have reviewed this patient's social history and updated it with pertinent information if needed.    Family History:  She indicated that her mother is . She indicated that her father is . She indicated that both of her sisters are alive. She indicated that her brother is alive. She indicated that both of her daughters are alive. She indicated that her son is alive. She indicated that the status of her maternal aunt is unknown. She indicated that the status of her maternal uncle is unknown. She indicated that the status of her paternal uncle is unknown. She indicated that the status of her cousin is unknown.     Family History   Problem Relation Age of Onset     Heart Disease Mother      Breast Cancer Mother         age 70 dx     Fuch's dystrophy Mother      C.A.D. Father      Neurologic Disorder Father         parkinsons     Hypertension Father      Hyperlipidemia Father      Depression Father         Parkinsons related     Anxiety Disorder Father         Parkinsons Disease     Hypertension Sister      Lung Cancer Sister      Fuch's dystrophy Sister      Cancer - colorectal Maternal Aunt      Cancer - colorectal Maternal Uncle      Cancer Paternal Uncle         bone     Other Cancer Cousin         Ovarian     Colon Cancer Other         Maternal Uncle     Diabetes Other         Child diabetes     Colon Cancer Other         Maternal Aunt   Father had heart attack at 61    Medications:  Current Outpatient Medications   Medication     clobetasol (TEMOVATE) 0.05 % ointment     fluticasone (FLONASE) 50 MCG/ACT nasal spray     ketoconazole (NIZORAL) 2 % external shampoo     SUMAtriptan (IMITREX) 25 MG tablet     tretinoin (RETIN-A) 0.025 % cream     fluocinolone (SYNALAR) 0.01 % external solution     No current facility-administered medications for this visit.      Exam:  BP (!)  "178/102 (BP Location: Right arm, Patient Position: Chair, Cuff Size: Adult Regular)   Pulse 78   Ht 1.655 m (5' 5.16\")   Wt 84.6 kg (186 lb 6.4 oz)   LMP 11/30/2004   SpO2 99%   BMI 30.87 kg/m     Body mass index is 30.87 kg/m .   General:  Alert, oriented, no acute distress  Eyes:  External exam wade  Mouth:  Moist mucosa  Ears:  Hearing grossly intact  Neck:  No thyromegaly. Carotid upstroke normal, no carotid bruit, no JVD  Lungs:  Clear to auscultation. No wheezes, crackles, rales or rhonchi,      no accessory muscle use   Heart:  Regular, normal S1 and S2, no obvious murmurs, no rubs or gallops  Abdomen: Soft, non-tender  Extremities: Trivial ankle edema, age appropriate skin without stasis  Pulses: Pedal 2+ bilaterally  Rodney/Psy: Non-focal, normal mood, normal affect      Vital Trend:  Wt Readings from Last 5 Encounters:   12/14/22 84.6 kg (186 lb 6.4 oz)   12/13/22 83.9 kg (185 lb)   11/06/22 84.8 kg (187 lb)   10/13/22 79.4 kg (175 lb)   08/05/22 82.6 kg (182 lb)     BP Readings from Last 5 Encounters:   12/14/22 (!) 178/102   12/13/22 132/70   11/13/22 (!) 158/98   11/06/22 (!) 157/88   08/05/22 134/85     Pulse Readings from Last 5 Encounters:   12/14/22 78   12/13/22 64   11/13/22 91   11/06/22 75   08/05/22 68        Data:   ECG (2022):  NSR    Echocardiogram (2013):   Normal stress echocardiogram. No stress induced regional wall motion     Stress TTE 12/14/2016  Interpretation Summary  This was a normal stress echocardiogram with no evidence of stress-induced  ischemia.  The patient exhibited chest pain during exercise.  There was a maximum 1.5mm ST segment depression in the inferior lead(s).  There was a maximum 1.5mm ST segment depression in the lateral lead(s).    CT Chest in 3/2014 without significant coronary calcifications    Lab Review:  Lab Results   Component Value Date    CR 0.80 12/13/2022    CR 0.77 08/02/2022    CR 0.80 12/06/2019    CR 0.83 11/09/2017    CR 0.76 12/13/2016     " (H) 12/13/2022     (H) 11/08/2019     (H) 11/09/2017    HDL 65 12/13/2022    HDL 51 11/08/2019    HDL 54 11/09/2017    POTASSIUM 4.0 12/13/2022    POTASSIUM 3.9 08/02/2022    POTASSIUM 3.9 11/09/2017     12/13/2022     08/02/2022     11/09/2017         Assessment:     Carol Barkley is a 67 year old female with moderate hypertension and dyslipedemia. We had a long discussion regarding initiating blood pressure medications for a systolic blood pressure goal of 120mmHg. She was agreeable to initiating losartan. Given her current LDL and cholesterol, we discussed initiating statin therapy and she was agreeable to starting a high potency statin. We also had a significant discussion regarding heart healthy life style and weight loss. Specifically we discussed time restricted eating and heart healthy diet high in vegetables.  If she should require second antihypertensive agent I would recommend a diuretic.    Plan:     1. HTN   - Losartan 100mg daily     2. Dyslipedemia   - Rosuvastatin 20mg daily    Contingency Plan: If symptomatic from losartan medication, instructed to split losartan in half, in case this is not sufficient and BP still >130 SBP. can add HCTZ or chlorthalidone 12.5 mg    Follow-up:PRN    I spent 60min for the chart preparation, visit and documentation   This note was software transcribed.      Please do not hesitate to contact me if you have any questions/concerns.     Sincerely,     Arnold Ji MD

## 2022-12-14 NOTE — PATIENT INSTRUCTIONS
Dr. Ji recommends:    Start taking losartan 100 MG once daily.    Start taking Crestor 20 MG once daily.    Follow up as needed.    Thank you for your visit today.  Please call me with any questions or concerns.   Jai Day RN  Cardiology Care Coordinator  [phone]   
Screen#: [id number]  Screen Date: 2022  Screen Comment: N/A

## 2022-12-19 ENCOUNTER — OFFICE VISIT (OUTPATIENT)
Dept: ORTHOPEDICS | Facility: CLINIC | Age: 67
End: 2022-12-19
Payer: COMMERCIAL

## 2022-12-19 DIAGNOSIS — S66.811D RUPTURE OF EXTENSOR TENDONS OF RIGHT HAND AND WRIST, SUBSEQUENT ENCOUNTER: Primary | ICD-10-CM

## 2022-12-19 PROCEDURE — 99213 OFFICE O/P EST LOW 20 MIN: CPT | Performed by: STUDENT IN AN ORGANIZED HEALTH CARE EDUCATION/TRAINING PROGRAM

## 2022-12-19 NOTE — NURSING NOTE
Reason For Visit:   Chief Complaint   Patient presents with     Surgical Followup     4 mo POP right EIP to EPL tendon transfer DOS: 8/5/22       Primary MD: Andrew Nair  Ref. MD: FARHAN    Age: 67 year old    ?  No      LMP 11/30/2004       Pain Assessment  Patient Currently in Pain: No (Right hand pain occasionally)    Hand Dominance Evaluation  Hand Dominance: Right          QuickDASH Assessment  QuickDASH Main 12/12/2022   1. Open a tight or new jar. Mild difficulty   2. Do heavy household chores (e.g., wash walls, floors) Mild difficulty   3. Carry a shopping bag or briefcase. No difficulty   4. Wash your back. Mild difficulty   5. Use a knife to cut food. No difficulty   6. Recreational activities in which you take some force or impact through your arm, shoulder or hand (e.g., golf, hammering, tennis, etc.). Moderate difficulty   7. During the past week, to what extent has your arm, shoulder or hand problem interfered with your normal social activities with family, friends, neighbours or groups? Not at all   8. During the past week, were you limited in your work or other regular daily activities as a result of your arm, shoulder or hand problem? Slightly limited   9. Arm, shoulder or hand pain. Mild   10.Tingling (pins and needles) in your arm,shoulder or hand. None   11. During the past week, how much difficulty have you had sleeping because of the pain in your arm, shoulder or hand? No difficulty   Quickdash Ability Score 15.91          Current Outpatient Medications   Medication Sig Dispense Refill     clobetasol (TEMOVATE) 0.05 % ointment        fluocinolone (SYNALAR) 0.01 % external solution  (Patient not taking: Reported on 12/14/2022)       fluticasone (FLONASE) 50 MCG/ACT nasal spray Spray 1 spray into both nostrils daily       ketoconazole (NIZORAL) 2 % external shampoo Apply topically daily as needed for itching or irritation 120 mL 6     losartan (COZAAR) 100 MG tablet Take 1 tablet  (100 mg) by mouth daily 90 tablet 11     rosuvastatin (CRESTOR) 20 MG tablet Take 1 tablet (20 mg) by mouth daily 90 tablet 11     SUMAtriptan (IMITREX) 25 MG tablet Take 1 tablet (25 mg) by mouth at onset of headache for migraine 9 tablet 0     tretinoin (RETIN-A) 0.025 % cream          Allergies   Allergen Reactions     Dust Mite Extract      Grass        Rosa Padron, AEMT

## 2022-12-19 NOTE — LETTER
12/19/2022         RE: Carol Barkley  [address]  [facility] [address]        Dear Colleague,    Thank you for referring your patient, Carol Barkley, to the [facility] [place]. Please see a copy of my visit note below.    Date of Service: Dec 19, 2022    Chief Complaint: Post operative follow up.      Date of Surgery: 8/5/22     Procedure Performed: Right extensor indicis proprius to extensor pollicis longus transfer.     Interval events: Carol Barklye is a 67 year old female who presents today for a postoperative follow up. She reports that she is doing well. She has had 2 gigs now playing the organ. Main challenge remains flexing and opposing the thumb and has some difficulty with scales. Also has had some trouble gripping a pencil as index finger remains a bit stiff in flexion as well so pinch is weaker. She has not been going to hand therapy recently.    The past medical history was reviewed updated in the EMR. This includes medications, surgeries, social history, and review of systems.    Physical examination:  Well-developed, well-nourished and in no acute distress.  Alert and oriented to surroundings.  On examination of the right hand, incision is well-healed. There is no erythema, drainage, or dehiscence. Swelling is Mild. Sensation is intact in median, radial and ulnar nerve distributions. Patient can actively flex and extend all digits. Fires EPL and FPL. Mild stiffness with thumb MCP and IP flexion less than contralateral. Also diminished flexion at DIP of the index. Fingers are warm and well-perfused. excellent active EPL function with hand flat on the table. Able to appose the thumb to distal tip of the ring finger. AROM of wrist is full.    Assessment: 67 year old female s/p right extensor indicis proprius to extensor pollicis longus transfer, progressing appropriately.      Plan: Recommend returning to hand therapy for thumb and index ROM for arthrofibrosis. She  is higher demand as a musician. Her ability with daily tasks is fine. No restrictions as her tendon is healed.    Anticipate it will take another few months for the fine dexterity required to improve.    Follow-up in 3 months for clinical check.    Geraldo Fischer MD    Hand, Upper Extremity & Microvascular Surgery  Department of Orthopedic Surgery  [facility]

## 2022-12-19 NOTE — PROGRESS NOTES
Date of Service: Dec 19, 2022    Chief Complaint: Post operative follow up.      Date of Surgery: 8/5/22     Procedure Performed: Right extensor indicis proprius to extensor pollicis longus transfer.     Interval events: Carol Barkley is a 67 year old female who presents today for a postoperative follow up. She reports that she is doing well. She has had 2 gigs now playing the organ. Main challenge remains flexing and opposing the thumb and has some difficulty with scales. Also has had some trouble gripping a pencil as index finger remains a bit stiff in flexion as well so pinch is weaker. She has not been going to hand therapy recently.    The past medical history was reviewed updated in the EMR. This includes medications, surgeries, social history, and review of systems.    Physical examination:  Well-developed, well-nourished and in no acute distress.  Alert and oriented to surroundings.  On examination of the right hand, incision is well-healed. There is no erythema, drainage, or dehiscence. Swelling is Mild. Sensation is intact in median, radial and ulnar nerve distributions. Patient can actively flex and extend all digits. Fires EPL and FPL. Mild stiffness with thumb MCP and IP flexion less than contralateral. Also diminished flexion at DIP of the index. Fingers are warm and well-perfused. excellent active EPL function with hand flat on the table. Able to appose the thumb to distal tip of the ring finger. AROM of wrist is full.    Assessment: 67 year old female s/p right extensor indicis proprius to extensor pollicis longus transfer, progressing appropriately.      Plan: Recommend returning to hand therapy for thumb and index ROM for arthrofibrosis. She is higher demand as a musician. Her ability with daily tasks is fine. No restrictions as her tendon is healed.    Anticipate it will take another few months for the fine dexterity required to improve.    Follow-up in 3 months for clinical check.    Geraldo GEORGES  MD Amado    Hand, Upper Extremity & Microvascular Surgery  Department of Orthopedic Surgery  [facility]

## 2022-12-21 NOTE — TELEPHONE ENCOUNTER
SPINE PATIENTS - NEW PROTOCOL PREVISIT    RECORDS RECEIVED FROM: Internal   REASON FOR VISIT: Numbness of toes /History of laminectomy    Date of Appt: 01/04/2023   NOTES (FOR ALL VISITS) STATUS DETAILS   OFFICE NOTE from referring provider Internal 12/13/2022 Dr Sánchez [facility]    OFFICE NOTE from other specialist N/A    DISCHARGE SUMMARY from hospital N/A    DISCHARGE REPORT from ER N/A    EMG REPORT N/A    MEDICATION LIST N/A    IMAGING  (FOR ALL VISITS)     MRI (HEAD, NECK, SPINE) N/A    XRAY (SPINE) *NEUROSURGERY* N/A    CT (HEAD, NECK, SPINE) N/A        yes

## 2023-01-03 NOTE — PROGRESS NOTES
ASSESSMENT: Carol Barkley is a 67 year old female with past medical history significant for migraine, Raynaud's phenomenon who presents today for new patient evaluation of left lower extremity numbness and weakness.  Patient has a history of a right-sided hemilaminectomy approximately 30 years ago.  Patient developed left foot paresthesias and weak sensation in the left dorsal foot and anterior shin about 12 to 18 months ago with no injury.  She denies any pain down the leg.  She has minimal low back pain.  Question L5 radiculopathy versus peroneal neuropathy.  On exam she reported a sensory deficit left toes 2, 3, 4.  She has slight weakness in the right EHL.    PLAN:  A shared decision making model was used.  The patient's values and choices were respected.  The following represents what was discussed and decided upon by the physician assistant and the patient.      1.  DIAGNOSTIC TESTS:  - I ordered an MRI lumbar spine.  -I also ordered an EMG left lower extremity.    2.  PHYSICAL THERAPY: No physical therapy was ordered today.  We will await the results of her diagnostic test.    3.  MEDICATIONS: No changes are made to the patient's medications.  She does not feel that she needs any medications to manage the symptoms.  She is not currently taking any pain relief medications.  -If symptoms worsen we could trial low-dose gabapentin.    4.  INTERVENTIONS: No interventions were ordered.    5.  REFERRALS: Patient would like to lose weight.  She states that she has tried losing weight on her own and has been unsuccessful.  I entered a referral to weight management.  She is interested in finding out if there are medication options to help her with weight loss.    6.  FOLLOW-UP:   I will send the patient a [employer] message with her MRI and EMG results.  If she has questions or concerns in the meantime, she should not hesitate to call.      SUBJECTIVE:  Carol Barkley  Is a 67 year old female who presents today in  "consultation at the request of Dr. Sánchez for new patient evaluation of left lower extremity numbness and weakness.  Patient reports that she had a right-sided hemilaminectomy in the 1990s.  She states that 12 to 18 months ago she began to experience numbness in left toes 2, 3, 4 and weakness in the left dorsal foot and anterior shin.  Symptoms are getting progressively worse.  She is concerned that she has a pinched nerve in her back given her history and she does not want symptoms to progress.  She states that her function is limited because of this.  She likes to go on walks and skiing and she cannot do these activities for prolonged periods of time because of the weakness and discomfort.    Patient complains of numbness and tingling left toes 2, 3, 4.  She complains of a weak feeling in the left anterior shin and dorsal foot.  She denies any significant pain down the leg.  She has slight centralized low back pain.  Symptoms are aggravated with cross-country skiing, walking, playing the organ.  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  Symptoms are alleviated with rest.  She states that if she is trying to go on longer walks or cross-country ski she has to stop the activity and rest for about an hour before symptoms improve.  Occasionally she feels \"twinges\" in the right foot but otherwise she denies any right-sided symptoms.  She has occasional bladder leaking when she plays the organ.  Denies loss of bowel control.  Denies recent fevers, chills, sweats.    Patient has not had any treatments for this.  She did do physical therapy for her back in 2008 and does home exercises regularly.  She does not go to a chiropractor.  She has never had any spine surgeries or spine injections.  She is not currently taking any pain relief medications.    Current Outpatient Medications   Medication     clobetasol (TEMOVATE) 0.05 % ointment     fluocinolone (SYNALAR) 0.01 % external solution "     fluticasone (FLONASE) 50 MCG/ACT nasal spray     ketoconazole (NIZORAL) 2 % external shampoo     losartan (COZAAR) 100 MG tablet     rosuvastatin (CRESTOR) 20 MG tablet     SUMAtriptan (IMITREX) 25 MG tablet     tretinoin (RETIN-A) 0.025 % cream         Allergies   Allergen Reactions     Dust Mite Extract      Grass        Past Medical History:   Diagnosis Date     Allergic rhinitis due to other allergen     Immunotherapy     Anxiety state 4/17/2006    Problem list name updated by automated process. Provider to review     Arthritis 2015     Chalazion of right upper eyelid 03/24/2017     CKD (chronic kidney disease) stage 2, GFR 60-89 ml/min      Cough     seen by Dr Freire and ENT, CXR NL     Cystourethrocele      Disturbance of skin sensation 04/17/2006    discussed possible nerve compression with L arm numbness & L upper back pain     Extensor tendon rupture of hand, right, initial encounter 07/28/2022    Added automatically from request for surgery [id number]     Female stress incontinence 11/9/2017     GERD (gastroesophageal reflux disease) 01/09/2012     Hearing loss      Hypertension 2021     Lichen sclerosus et atrophicus of the vulva     [place]     Migraine without aura, without mention of intractable migraine without mention of status migrainosus      Migraine without status migrainosus, not intractable, unspecified migraine type 12/04/2015     PAC (premature atrial contraction) 12/2009    Holter     Pain in joint, lower leg 12/19/2011     Palpitations 05/17/2006     Personal history of colonic polyps      Pure hypercholesterolemia      PVD (posterior vitreous detachment)      Rectocele      Right wrist pain 05/01/2022     Unspecified sinusitis (chronic)      Urine, incontinence, stress female         Patient Active Problem List   Diagnosis     Migraine without aura     History of colonic polyps     Raynaud phenomenon     Advanced directives, counseling/discussion     Seasonal allergic rhinitis     Elevated  LDL cholesterol level     Family history of lung cancer     Family history of malignant neoplasm of breast     BMI 30.0-30.9,adult     Chronic left-sided thoracic back pain     YIP (dyspnea on exertion)     Family history of ischemic heart disease (IHD)     Night sweats     History of laminectomy     Personal history of traumatic fracture     Numbness of toes     History of gastroesophageal reflux (GERD)     Constipation, unspecified constipation type     Decreased hearing, unspecified laterality     History of COVID-19     History of shingles     Family history of colon cancer       Past Surgical History:   Procedure Laterality Date     BREAST BIOPSY, RT/LT       COLONOSCOPY  01/01/2014     COLONOSCOPY  01/08/2014    Procedure: COLONOSCOPY;  colonoscopy  ;  Surgeon: Jose Newton MD;  Location:  GI     ENT SURGERY  1964    tonsilectomy     HC REMOVAL GALLBLADDER      Cholecystectomy     HYSTERECTOMY VAGINAL  05/17/2011    w/ bilateral salphingo-oophorectomy, eterocele reapir w/ Jamie culdoplasty, anterior colporrhaphy, posterior colpoperineorrhaphy, pubovaginal sling (mini-Arc) placement, cystoscopy and suprapubic catheter palcement     HYSTERECTOMY, PAP NO LONGER INDICATED       HYSTEROSCOPY  02/01/2007    D&C     ORTHOPEDIC SURGERY  Broken wrist 2022     SOFT TISSUE SURGERY  Tendon transfer 2022     TRANSFER, TENDON FOREARM, WRIST, HAND Right 08/05/2022    Procedure: RIGHT TENDON TRANSFER EXTENSOR INDICIS PROPRIUS TO EXTENSOR POLLICIS LONGUS;  Surgeon: Geraldo Fischer MD;  Location: [facility] [place]     TUBAL LIGATION  01/01/1989     ZZC APPENDECTOMY       ZZC NARANJO W/O FACETEC FORAMOT/DSKC 1/2 VRT SEG, LUMBAR      L5-S1     ZZC STRESS ECHO (TREADMILL) FL  04/01/2006       Family History   Problem Relation Age of Onset     Heart Disease Mother      Breast Cancer Mother         age 70 dx     Fuch's dystrophy Mother      C.A.D. Father      Neurologic Disorder Father         parkinsons     Hypertension Father       Hyperlipidemia Father      Depression Father         Parkinsons related     Anxiety Disorder Father         Parkinsons Disease     Hypertension Sister      Lung Cancer Sister      Fuch's dystrophy Sister      Cancer - colorectal Maternal Aunt      Cancer - colorectal Maternal Uncle      Cancer Paternal Uncle         bone     Other Cancer Cousin         Ovarian     Colon Cancer Other         Maternal Uncle     Diabetes Other         Child diabetes     Colon Cancer Other         Maternal Aunt       Social history: Patient is a professional organist.  She denies tobacco, alcohol, illicit drug use.      ROS: Positive for ringing in ears, joint pain, muscle fatigue.  Specifically negative for bowel/bladder dysfunction, fevers,chills, appetite changes, unexplained weight loss.   Otherwise 13 systems reviewed are negative.  Please see the patient's intake questionnaire from today for details.      OBJECTIVE:  PHYSICAL EXAMINATION:    CONSTITUTIONAL:  Vital signs as above.  No acute distress.  The patient is well nourished and well groomed.  PSYCHIATRIC:  The patient is awake, alert, oriented to person, place, time and answering questions appropriately with clear speech.    HEENT: Normocephalic, atraumatic.  Sclera clear.  Neck is supple.  SKIN:  Skin over the face, bilateral lower extremities, and posterior torso is clean, dry, intact without rashes.    GAIT:  Gait is non-antalgic.  The patient is able to heel and toe walk without significant difficulty.    MUSCLE STRENGTH:  The patient has 4+/5 strength left EHL, otherwise 5/5 strength for the bilateral hip flexors, knee flexors/extensors, ankle dorsiflexors/plantar flexors, right EHL.  NEUROLOGICAL: 2+ patellar, and 1+ achilles reflexes bilaterally.  Negative Babinski's bilaterally.  No ankle clonus bilaterally.  Subjective diminished sensation left toes 2, 3, 4.  VASCULAR:  2/4 dorsalis pedis pulses bilaterally.  Bilateral lower extremities are warm.  There is no  pitting edema of the bilateral lower extremities.  ABDOMINAL:  Soft, non-distended, non-tender throughout all quadrants.  No pulsatile mass palpated in the left lower quadrant.  MUSCULOSKELETAL: Straight leg raise is negative bilaterally.

## 2023-01-04 ENCOUNTER — PRE VISIT (OUTPATIENT)
Dept: NEUROSURGERY | Facility: CLINIC | Age: 68
End: 2023-01-04

## 2023-01-06 ENCOUNTER — OFFICE VISIT (OUTPATIENT)
Dept: PHYSICAL MEDICINE AND REHAB | Facility: CLINIC | Age: 68
End: 2023-01-06
Payer: COMMERCIAL

## 2023-01-06 VITALS
BODY MASS INDEX: 31.14 KG/M2 | HEIGHT: 65 IN | HEART RATE: 71 BPM | WEIGHT: 186.9 LBS | DIASTOLIC BLOOD PRESSURE: 75 MMHG | SYSTOLIC BLOOD PRESSURE: 149 MMHG

## 2023-01-06 DIAGNOSIS — Z98.890 HISTORY OF LAMINECTOMY: ICD-10-CM

## 2023-01-06 DIAGNOSIS — E66.811 CLASS 1 OBESITY WITH BODY MASS INDEX (BMI) OF 30.0 TO 30.9 IN ADULT, UNSPECIFIED OBESITY TYPE, UNSPECIFIED WHETHER SERIOUS COMORBIDITY PRESENT: ICD-10-CM

## 2023-01-06 DIAGNOSIS — R29.898 WEAKNESS OF LEFT FOOT: Primary | ICD-10-CM

## 2023-01-06 DIAGNOSIS — R20.0 NUMBNESS OF TOES: ICD-10-CM

## 2023-01-06 PROCEDURE — 99204 OFFICE O/P NEW MOD 45 MIN: CPT | Performed by: PHYSICIAN ASSISTANT

## 2023-01-06 ASSESSMENT — PAIN SCALES - GENERAL: PAINLEVEL: MILD PAIN (3)

## 2023-01-06 NOTE — LETTER
1/6/2023         RE: Carol Barkley  [address]  [facility] [address]        Dear Colleague,    Thank you for referring your patient, Carol Barkley, to the [facility]. Please see a copy of my visit note below.    ASSESSMENT: Carol Barkley is a 67 year old female with past medical history significant for migraine, Raynaud's phenomenon who presents today for new patient evaluation of left lower extremity numbness and weakness.  Patient has a history of a right-sided hemilaminectomy approximately 30 years ago.  Patient developed left foot paresthesias and weak sensation in the left dorsal foot and anterior shin about 12 to 18 months ago with no injury.  She denies any pain down the leg.  She has minimal low back pain.  Question L5 radiculopathy versus peroneal neuropathy.  On exam she reported a sensory deficit left toes 2, 3, 4.  She has slight weakness in the right EHL.    PLAN:  A shared decision making model was used.  The patient's values and choices were respected.  The following represents what was discussed and decided upon by the physician assistant and the patient.      1.  DIAGNOSTIC TESTS:  - I ordered an MRI lumbar spine.  -I also ordered an EMG left lower extremity.    2.  PHYSICAL THERAPY: No physical therapy was ordered today.  We will await the results of her diagnostic test.    3.  MEDICATIONS: No changes are made to the patient's medications.  She does not feel that she needs any medications to manage the symptoms.  She is not currently taking any pain relief medications.  -If symptoms worsen we could trial low-dose gabapentin.    4.  INTERVENTIONS: No interventions were ordered.    5.  REFERRALS: Patient would like to lose weight.  She states that she has tried losing weight on her own and has been unsuccessful.  I entered a referral to weight management.  She is interested in finding out if there are medication options to help her with weight loss.    6.   "FOLLOW-UP:   I will send the patient a [employer] message with her MRI and EMG results.  If she has questions or concerns in the meantime, she should not hesitate to call.      SUBJECTIVE:  Carol Barkley  Is a 67 year old female who presents today in consultation at the request of Dr. Sánchez for new patient evaluation of left lower extremity numbness and weakness.  Patient reports that she had a right-sided hemilaminectomy in the 1990s.  She states that 12 to 18 months ago she began to experience numbness in left toes 2, 3, 4 and weakness in the left dorsal foot and anterior shin.  Symptoms are getting progressively worse.  She is concerned that she has a pinched nerve in her back given her history and she does not want symptoms to progress.  She states that her function is limited because of this.  She likes to go on walks and skiing and she cannot do these activities for prolonged periods of time because of the weakness and discomfort.    Patient complains of numbness and tingling left toes 2, 3, 4.  She complains of a weak feeling in the left anterior shin and dorsal foot.  She denies any significant pain down the leg.  She has slight centralized low back pain.  Symptoms are aggravated with cross-country skiing, walking, playing the organ.  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  Symptoms are alleviated with rest.  She states that if she is trying to go on longer walks or cross-country ski she has to stop the activity and rest for about an hour before symptoms improve.  Occasionally she feels \"twinges\" in the right foot but otherwise she denies any right-sided symptoms.  She has occasional bladder leaking when she plays the organ.  Denies loss of bowel control.  Denies recent fevers, chills, sweats.    Patient has not had any treatments for this.  She did do physical therapy for her back in 2008 and does home exercises regularly.  She does not go to a chiropractor.  She has " never had any spine surgeries or spine injections.  She is not currently taking any pain relief medications.    Current Outpatient Medications   Medication     clobetasol (TEMOVATE) 0.05 % ointment     fluocinolone (SYNALAR) 0.01 % external solution     fluticasone (FLONASE) 50 MCG/ACT nasal spray     ketoconazole (NIZORAL) 2 % external shampoo     losartan (COZAAR) 100 MG tablet     rosuvastatin (CRESTOR) 20 MG tablet     SUMAtriptan (IMITREX) 25 MG tablet     tretinoin (RETIN-A) 0.025 % cream         Allergies   Allergen Reactions     Dust Mite Extract      Grass        Past Medical History:   Diagnosis Date     Allergic rhinitis due to other allergen     Immunotherapy     Anxiety state 4/17/2006    Problem list name updated by automated process. Provider to review     Arthritis 2015     Chalazion of right upper eyelid 03/24/2017     CKD (chronic kidney disease) stage 2, GFR 60-89 ml/min      Cough     seen by Dr Freire and ENT, CXR NL     Cystourethrocele      Disturbance of skin sensation 04/17/2006    discussed possible nerve compression with L arm numbness & L upper back pain     Extensor tendon rupture of hand, right, initial encounter 07/28/2022    Added automatically from request for surgery [id number]     Female stress incontinence 11/9/2017     GERD (gastroesophageal reflux disease) 01/09/2012     Hearing loss      Hypertension 2021     Lichen sclerosus et atrophicus of the vulva     [place]     Migraine without aura, without mention of intractable migraine without mention of status migrainosus      Migraine without status migrainosus, not intractable, unspecified migraine type 12/04/2015     PAC (premature atrial contraction) 12/2009    Holter     Pain in joint, lower leg 12/19/2011     Palpitations 05/17/2006     Personal history of colonic polyps      Pure hypercholesterolemia      PVD (posterior vitreous detachment)      Rectocele      Right wrist pain 05/01/2022     Unspecified sinusitis (chronic)       Urine, incontinence, stress female         Patient Active Problem List   Diagnosis     Migraine without aura     History of colonic polyps     Raynaud phenomenon     Advanced directives, counseling/discussion     Seasonal allergic rhinitis     Elevated LDL cholesterol level     Family history of lung cancer     Family history of malignant neoplasm of breast     BMI 30.0-30.9,adult     Chronic left-sided thoracic back pain     YIP (dyspnea on exertion)     Family history of ischemic heart disease (IHD)     Night sweats     History of laminectomy     Personal history of traumatic fracture     Numbness of toes     History of gastroesophageal reflux (GERD)     Constipation, unspecified constipation type     Decreased hearing, unspecified laterality     History of COVID-19     History of shingles     Family history of colon cancer       Past Surgical History:   Procedure Laterality Date     BREAST BIOPSY, RT/LT       COLONOSCOPY  01/01/2014     COLONOSCOPY  01/08/2014    Procedure: COLONOSCOPY;  colonoscopy  ;  Surgeon: Jose Newton MD;  Location:  GI     ENT SURGERY  1964    tonsilectomy     HC REMOVAL GALLBLADDER      Cholecystectomy     HYSTERECTOMY VAGINAL  05/17/2011    w/ bilateral salphingo-oophorectomy, eterocele reapir w/ Jamie culdoplasty, anterior colporrhaphy, posterior colpoperineorrhaphy, pubovaginal sling (mini-Arc) placement, cystoscopy and suprapubic catheter palcement     HYSTERECTOMY, PAP NO LONGER INDICATED       HYSTEROSCOPY  02/01/2007    D&C     ORTHOPEDIC SURGERY  Broken wrist 2022     SOFT TISSUE SURGERY  Tendon transfer 2022     TRANSFER, TENDON FOREARM, WRIST, HAND Right 08/05/2022    Procedure: RIGHT TENDON TRANSFER EXTENSOR INDICIS PROPRIUS TO EXTENSOR POLLICIS LONGUS;  Surgeon: Geraldo Fischer MD;  [place] [facility] [place]     TUBAL LIGATION  01/01/1989     Z APPENDECTOMY       ZC NARANJO W/O FACETEC FORAMOT/DSKC 1/2 VRT SEG, LUMBAR      L5-S1     ZZC STRESS ECHO (TREADMILL) [place]  04/01/2006        Family History   Problem Relation Age of Onset     Heart Disease Mother      Breast Cancer Mother         age 70 dx     Fuch's dystrophy Mother      C.A.D. 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      ROS: Positive for ringing in ears, joint pain, muscle fatigue.  Specifically negative for bowel/bladder dysfunction, fevers,chills, appetite changes, unexplained weight loss.   Otherwise 13 systems reviewed are negative.  Please see the patient's intake questionnaire from today for details.      OBJECTIVE:  PHYSICAL EXAMINATION:    CONSTITUTIONAL:  Vital signs as above.  No acute distress.  The patient is well nourished and well groomed.  PSYCHIATRIC:  The patient is awake, alert, oriented to person, place, time and answering questions appropriately with clear speech.    HEENT: Normocephalic, atraumatic.  Sclera clear.  Neck is supple.  SKIN:  Skin over the face, bilateral lower extremities, and posterior torso is clean, dry, intact without rashes.    GAIT:  Gait is non-antalgic.  The patient is able to heel and toe walk without significant difficulty.    MUSCLE STRENGTH:  The patient has 4+/5 strength left EHL, otherwise 5/5 strength for the bilateral hip flexors, knee flexors/extensors, ankle dorsiflexors/plantar flexors, right EHL.  NEUROLOGICAL: 2+ patellar, and 1+  achilles reflexes bilaterally.  Negative Babinski's bilaterally.  No ankle clonus bilaterally.  Subjective diminished sensation left toes 2, 3, 4.  VASCULAR:  2/4 dorsalis pedis pulses bilaterally.  Bilateral lower extremities are warm.  There is no pitting edema of the bilateral lower extremities.  ABDOMINAL:  Soft, non-distended, non-tender throughout all quadrants.  No pulsatile mass palpated in the left lower quadrant.  MUSCULOSKELETAL: Straight leg raise is negative bilaterally.            Again, thank you for allowing me to participate in the care of your patient.        Sincerely,        Esme Goetz PA-C

## 2023-01-06 NOTE — PATIENT INSTRUCTIONS
[facility] Scheduling    Please call [phone] to schedule your image(s) (select option#1).

## 2023-01-10 ENCOUNTER — THERAPY VISIT (OUTPATIENT)
Dept: OCCUPATIONAL THERAPY | Facility: CLINIC | Age: 68
End: 2023-01-10
Attending: STUDENT IN AN ORGANIZED HEALTH CARE EDUCATION/TRAINING PROGRAM
Payer: COMMERCIAL

## 2023-01-10 DIAGNOSIS — S66.811D RUPTURE OF EXTENSOR TENDONS OF RIGHT HAND AND WRIST, SUBSEQUENT ENCOUNTER: ICD-10-CM

## 2023-01-10 DIAGNOSIS — Z47.89 AFTERCARE FOLLOWING SURGERY OF THE MUSCULOSKELETAL SYSTEM: ICD-10-CM

## 2023-01-10 DIAGNOSIS — M25.641 STIFFNESS OF FINGER JOINT OF RIGHT HAND: ICD-10-CM

## 2023-01-10 PROCEDURE — 97165 OT EVAL LOW COMPLEX 30 MIN: CPT | Mod: GO | Performed by: OCCUPATIONAL THERAPIST

## 2023-01-10 PROCEDURE — 97110 THERAPEUTIC EXERCISES: CPT | Mod: GO | Performed by: OCCUPATIONAL THERAPIST

## 2023-01-10 NOTE — PROGRESS NOTES
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     Problem began: 4/23/2022.   Problem occurred: Fall   Pain is reported as 2/10 on pain scale.  General health as reported by patient is good.  Pertinent medical history includes: history of fractures and osteoarthritis.     Medical allergies: none.   Surgeries include:  Orthopedic surgery.    Current medications:  High blood pressure medication.    Current occupation is Organist.   Primary job tasks include:  Computer work, prolonged sitting, repetitive tasks and other.   Other job/home tasks details: Repetitive use of fingers.              * The above history was provided by the patient.    Occupational Profile Information:  Right hand dominant  Occupation: Organist  Prior functional level:  No limitations  Mobility: No difficulty  Transportation: Drives  Other: Difficulty playing pieces with scale passages using right hand due to thumb stiffness    Patient reports symptoms of pain, stiffness/loss of motion and weakness/loss of strength  Special tests: x-ray, ultrasound  Previous treatment: Above procedure, hand therapy for distal radius fracture and following tendon transfer surgery  Other: Of note, patient sustained a nondisplaced right distal radius fracture on 4/23/22 which was treated nonoperatively. She was diagnosed with an extensor pollicis longus rupture with inability to extend the thumb approximately 2 months after the injury.     Functional Outcome Measure:   Upper Extremity Functional Index Score:  SCORE: 73/80   (A lower score indicates greater disability.)        Objective:  Pain Level (Scale 0-10)    1/10/2023   At Rest 0/10   With Use 3/10 at worst     Pain Description  Date 1/10/2023   Location Right thumb, index finger, ulnar aspect of wrist   Pain Quality Aching   Frequency intermittent     Pain is worst  daytime   Exacerbated by  Use, prolonged playing   Relieved by Rest   Progression Gradually improving     Edema  None noted on exam.    Sensation   WNL throughout all nerve distributions; per patient report.    ROM  Thumb 1/10/2023 1/10/2023   AROM  (PROM) Left Right   MP 0/65 0/55   IP 0/68 0/35   RABD 70 55   PABD 65 51   Kapandji Opposition Scale (0-10/10) Small finger distal palmar crease Small finger PIP joint     Index Finger 1/10/2023 1/10/2023   AROM (PROM) Left Right   MCP 0/80 0/80   PIP 0/104 0/80   DIP 0/80 0/47   DURAN 264 207       Strength   (Measured in pounds)  Pain Report: - none  + mild    ++ moderate    +++ severe    1/10/2023 1/10/2023   Trials Left Right   1  2 52  62   33  51   Average 57 42     Lat Pinch 1/10/2023 1/10/2023   Trials Left Right   1 14 12     3 Pt Pinch 1/10/2023 1/10/2023   Trials Left Right   1 13 9.5       Assessment:  Patient presents with symptoms consistent with diagnosis of the above condition,  with surgical  intervention.     Patient's limitations or Problem List includes:  Pain, Decreased ROM/motion, Decreased , Decreased coordination and Decreased dexterity of the right hand and thumb which interferes with the patient's ability to perform Work Tasks, Recreational Activities and Household Chores as compared to previous level of function.    Rehab Potential:  Excellent - Return to full activity, no limitations    Patient will benefit from skilled Occupational Therapy to increase ROM,  strength, coordination and dexterity and decrease pain to return to previous activity level and resume normal daily tasks and to reach their rehab potential.    Barriers to Learning:  No barrier    Communication Issues:  Patient appears to be able to clearly communicate  and understand verbal and written communication and follow directions correctly.    Chart Review: Chart Review    Identified Performance Deficits: driving and community mobility, home establishment and management, meal preparation and cleanup, shopping, work and leisure activities    Assessment of Occupational Performance:  3-5 Performance Deficits    Clinical Decision Making (Complexity): Low complexity    Treatment Explanation:  The following has been discussed with the patient:  RX ordered/plan of care  Anticipated outcomes  Possible risks and side effects    Plan:  Frequency:  1 X week, once daily  Duration:  for 6 weeks    Treatment Plan:   Modalities:  US and Paraffin  Therapeutic Exercise:  AROM, PROM, Tendon Gliding, Blocking and Isotonics  Manual Techniques:  Joint mobilization, Scar mobilization and Myofascial release  Orthotic Fabrication:  As indicated    Discharge Plan:  Achieve all LTG.  Independent in home treatment program.  Reach maximal therapeutic benefit.    Home Exercise Program:  See flowsheet    Next Visit:  Progress ROM  Consider static-progressive orthosis for index finger

## 2023-01-10 NOTE — PROGRESS NOTES
[facility]    OUTPATIENT Occupational Therapy ORTHOPEDIC EVALUATION  PLAN OF TREATMENT FOR OUTPATIENT REHABILITATION  (COMPLETE FOR INITIAL CLAIMS ONLY)  Patient's Last Name, First Name, M.I.  YOB: 1955  Carol Barkley    Provider s Name:  [facility]   Medical Record No.  [id number]   Start of Care Date:  01/10/23   Onset Date:  08/05/23    Treatment Diagnosis:  Right thumb and index finger stiffness status post EIP to EPL tendon transfer Medical Diagnosis:     Rupture of extensor tendons of right hand and wrist, subsequent encounter  Stiffness of finger joint of right hand  Aftercare following surgery of the musculoskeletal system       Goals:     01/10/23 0500   Goal #1   Goal #1 work   Current Functional Task Other - on additional line   Other Work Play organ   Current Performance Level Unable to play pieces with scale passages due to right thumb stiffness   STG Target Performance Other - on additional line   Other Work Play organ   STG Target Perform Level Able to play pieces with scale passages on 50% of attempts.   Due Date 01/31/23   LTG Target Task/Performance No Difficulty   Due Date 02/21/23         Therapy Frequency:  1x/week  Predicted Duration of Therapy Intervention:  6 weeks    Abby Limon OT                 I CERTIFY THE NEED FOR THESE SERVICES FURNISHED UNDER        THIS PLAN OF TREATMENT AND WHILE UNDER MY CARE     (Physician attestation of this document indicates review and certification of the therapy plan).                     Certification Date From:  01/10/23   Certification Date To:  02/21/23    Referring Provider:  Geraldo Fiscehr MD    Initial Assessment        See Epic Evaluation SOC Date: 01/10/23

## 2023-01-13 ENCOUNTER — ANCILLARY PROCEDURE (OUTPATIENT)
Dept: MRI IMAGING | Facility: CLINIC | Age: 68
End: 2023-01-13
Attending: PHYSICIAN ASSISTANT
Payer: COMMERCIAL

## 2023-01-13 DIAGNOSIS — R20.0 NUMBNESS OF TOES: ICD-10-CM

## 2023-01-13 DIAGNOSIS — Z98.890 HISTORY OF LAMINECTOMY: ICD-10-CM

## 2023-01-13 DIAGNOSIS — R29.898 WEAKNESS OF LEFT FOOT: ICD-10-CM

## 2023-01-13 PROCEDURE — 72148 MRI LUMBAR SPINE W/O DYE: CPT | Mod: GC | Performed by: RADIOLOGY

## 2023-02-01 ENCOUNTER — OFFICE VISIT (OUTPATIENT)
Dept: PHYSICAL MEDICINE AND REHAB | Facility: CLINIC | Age: 68
End: 2023-02-01
Attending: PHYSICIAN ASSISTANT
Payer: COMMERCIAL

## 2023-02-01 DIAGNOSIS — R29.898 WEAKNESS OF LEFT FOOT: ICD-10-CM

## 2023-02-01 DIAGNOSIS — R20.0 NUMBNESS OF TOES: ICD-10-CM

## 2023-02-01 PROCEDURE — 95909 NRV CNDJ TST 5-6 STUDIES: CPT | Performed by: PHYSICAL MEDICINE & REHABILITATION

## 2023-02-01 PROCEDURE — 95886 MUSC TEST DONE W/N TEST COMP: CPT | Mod: LT | Performed by: PHYSICAL MEDICINE & REHABILITATION

## 2023-02-01 NOTE — PATIENT INSTRUCTIONS
Thank you for choosing the [facility] for your EMG testing.    The ordering provider will receive your final EMG results within the next few days.  Please follow up with your provider for the results and further treatment recommendations.

## 2023-02-01 NOTE — PROGRESS NOTES
[facility]  [address] [place] [address] [address]  [place] [address]  Office: [phone] Fax: [phone]    Electromyography and Nerve Conduction Study Report        Indication: Patient presents at the request of Esme Goetz for left lower extremity EMG.  She has paresthesias of the left digit 2 3 and 4 and weakness of the left foot.  She has some low back pain as well.  On exam she has normal sensation to light touch of the lower extremities, 2+ patellar 1+ Achilles reflexes bilaterally, and normal muscle strength throughout the major muscle groups of the left lower extremity.              Pt Exam Discussion (Communication Barriers):  Electromyography and nerve conduction testing, including associated discomfort, risks, benefits, and alternatives was discussed with the patient prior to the procedure.  No learning/ communication barriers; patient verbalized understanding of procedure.  Informed consent was obtained.           Pt Assessment:  Testing was successfully completed; patient tolerated testing well.       Blood Thinners: None Skin Temperature: Warmed 30.1                   EMG/NCS  results:     Nerve Conduction Studies  Motor Sites      Amplitude Segment CV Distal Latency F-Latency F-Estimate Temp Comment   Site (mV)  (m/s) (ms) ms ms  C    Left Fibular (EDB) Motor   Ankle 6.8 Ankle-EDB  4.4   23.4    Knee 6.1 Knee-Ankle 48 12.5   23.3    Left Tibial (AH) Motor   Ankle 6.5 Ankle-AH  3.1   23.1    Knee 4.2 Knee-Ankle 43 13.1   23.1      Sensory Sites      Amplitude CV Onset Lat Peak Lat Temp Comment   Site ( V) (m/s) (ms) (ms)  C    Left Sural Sensory   B-Ankle 11 44 3.2 3.5 24.6      H-Reflex Sites      M-Lat H Lat M Neg Amp   Site (ms) (ms) mV   Left Tibial (Soleus) H-Reflex   Pop Fossa 5.3 36.5 11.3   Right Tibial (Soleus) H-Reflex   Pop Fossa 5.4 36.0 4.6     H-Reflex Sites      Lt. H Lat Rt. H Lat L-R H Lat   Site (ms) (ms) (ms) Norm   Tibial (Soleus) H-Reflex   Pop Fossa  36.5 36.0 0.50  < 3.0       NCS Waveforms:    Motor         Sensory       H-Reflex           Electromyography     Side Muscle Nerve Root Ins Act Fibs Psw Amp Dur Poly Recrt Int Pat Comment   Left AntTibialis Dp Br Fibular L4-5 Nml Nml Nml Nml Nml 0 Nml Nml    Left Gastroc Tibial S1-2 Nml Nml Nml Nml Nml 0 Nml Nml    Left Fibularis Long Sup Br Fibular L5-S1 Nml Nml Nml Nml Nml 0 Nml Nml    Left VastusLat Femoral L2-4 Nml Nml Nml Nml Nml 0 Nml Nml    Left RectFemoris Femoral L2-4 Nml Nml Nml Nml Nml 0 Nml Nml          Comment NCS: Normal study  1.  Normal nerve conduction studies left lower extremity.  This includes left sural SNAP, peroneal and tibial CMAPs, and symmetric tibial H reflexes.     Comment EMG: Normal study  1.  Normal needle EMG left lower extremity.    Interpretation: Normal study    1. There is no electrodiagnostic evidence of lumbosacral radiculopathy, lumbosacral plexopathy, or focal neuropathy in the left lower extremity.  Specifically, there is no electrodiagnostic evidence of a left L5/S1 radiculopathy or peroneal neuropathy in the left lower extremity.     The testing was completed in its entirety by the physician.      It was our pleasure caring for your patient today, if there any questions or concerns please do not hesitate to contact us.

## 2023-02-01 NOTE — LETTER
2/1/2023         RE: Carol Barkley  [address]  [facility] [address]        Dear Colleague,    Thank you for referring your patient, Carol Barkley, to the [facility] SPINE [facility]UR[facility]ERY. Please see a copy of my visit note below.    [facility]  [address] [place] [id number]  Office: [phone] Fax: [phone]    Electromyography and Nerve Conduction Study Report        Indication: Patient presents at the request of Esme Goetz for left lower extremity EMG.  She has paresthesias of the left digit 2 3 and 4 and weakness of the left foot.  She has some low back pain as well.  On exam she has normal sensation to light touch of the lower extremities, 2+ patellar 1+ Achilles reflexes bilaterally, and normal muscle strength throughout the major muscle groups of the left lower extremity.              Pt Exam Discussion (Communication Barriers):  Electromyography and nerve conduction testing, including associated discomfort, risks, benefits, and alternatives was discussed with the patient prior to the procedure.  No learning/ communication barriers; patient verbalized understanding of procedure.  Informed consent was obtained.           Pt Assessment:  Testing was successfully completed; patient tolerated testing well.       Blood Thinners: None Skin Temperature: Warmed 30.1                   EMG/NCS  results:     Nerve Conduction Studies  Motor Sites      Amplitude Segment CV Distal Latency F-Latency F-Estimate Temp Comment   Site (mV)  (m/s) (ms) ms ms  C    Left Fibular (EDB) Motor   Ankle 6.8 Ankle-EDB  4.4   23.4    Knee 6.1 Knee-Ankle 48 12.5   23.3    Left Tibial (AH) Motor   Ankle 6.5 Ankle-AH  3.1   23.1    Knee 4.2 Knee-Ankle 43 13.1   23.1      Sensory Sites      Amplitude CV Onset Lat Peak Lat Temp Comment   Site ( V) (m/s) (ms) (ms)  C    Left Sural Sensory   B-Ankle 11 44 3.2 3.5 24.6      H-Reflex Sites      M-Lat H Lat M Neg Amp    Site (ms) (ms) mV   Left Tibial (Soleus) H-Reflex   Pop Fossa 5.3 36.5 11.3   Right Tibial (Soleus) H-Reflex   Pop Fossa 5.4 36.0 4.6     H-Reflex Sites      Lt. H Lat Rt. H Lat L-R H Lat   Site (ms) (ms) (ms) Norm   Tibial (Soleus) H-Reflex   Pop Fossa 36.5 36.0 0.50  < 3.0       NCS Waveforms:    Motor         Sensory       H-Reflex           Electromyography     Side Muscle Nerve Root Ins Act Fibs Psw Amp Dur Poly Recrt Int Pat Comment   Left AntTibialis Dp Br Fibular L4-5 Nml Nml Nml Nml Nml 0 Nml Nml    Left Gastroc Tibial S1-2 Nml Nml Nml Nml Nml 0 Nml Nml    Left Fibularis Long Sup Br Fibular L5-S1 Nml Nml Nml Nml Nml 0 Nml Nml    Left VastusLat Femoral L2-4 Nml Nml Nml Nml Nml 0 Nml Nml    Left RectFemoris Femoral L2-4 Nml Nml Nml Nml Nml 0 Nml Nml          Comment NCS: Normal study  1.  Normal nerve conduction studies left lower extremity.  This includes left sural SNAP, peroneal and tibial CMAPs, and symmetric tibial H reflexes.     Comment EMG: Normal study  1.  Normal needle EMG left lower extremity.    Interpretation: Normal study    1. There is no electrodiagnostic evidence of lumbosacral radiculopathy, lumbosacral plexopathy, or focal neuropathy in the left lower extremity.  Specifically, there is no electrodiagnostic evidence of a left L5/S1 radiculopathy or peroneal neuropathy in the left lower extremity.     The testing was completed in its entirety by the physician.      It was our pleasure caring for your patient today, if there any questions or concerns please do not hesitate to contact us.            Again, thank you for allowing me to participate in the care of your patient.        Sincerely,        Sebastien Rosales DO

## 2023-02-06 RX ORDER — BISACODYL 5 MG/1
TABLET, DELAYED RELEASE ORAL
Qty: 4 TABLET | Refills: 0 | Status: SHIPPED | OUTPATIENT
Start: 2023-02-06 | End: 2023-02-22

## 2023-02-06 NOTE — TELEPHONE ENCOUNTER
Rescheduled colonoscopy    Attempted to contact patient regarding upcoming Colonoscopy  procedure on 2.17.23 for pre assessment questions. No answer.     Left message to return call to [phone] #[phone]    Discuss Covid policy and designated  policy.    Pre op exam scheduled: N/A    Arrival time: 1300. Procedure time: 1400    [facility]; [address] [place] [address], [address] Floor, [place] [id number]    Sedation type: Conscious sedation     Anticoagulants: No    Electronic implanted devices? No    Diabetic? No    Indication for procedure: screening colonoscopy    Bowel prep recommendation: Standard Golytely       Prep instructions sent via [employer]. Bowel prep script previously sent to    [facility] [id number] [place] [address] WILLAM Morton RN  Endoscopy Procedure Pre Assessment ASHLI

## 2023-02-07 ENCOUNTER — THERAPY VISIT (OUTPATIENT)
Dept: OCCUPATIONAL THERAPY | Facility: CLINIC | Age: 68
End: 2023-02-07
Payer: COMMERCIAL

## 2023-02-07 DIAGNOSIS — Z47.89 AFTERCARE FOLLOWING SURGERY OF THE MUSCULOSKELETAL SYSTEM: ICD-10-CM

## 2023-02-07 DIAGNOSIS — M25.641 STIFFNESS OF FINGER JOINT OF RIGHT HAND: Primary | ICD-10-CM

## 2023-02-07 PROCEDURE — 97110 THERAPEUTIC EXERCISES: CPT | Mod: GO | Performed by: OCCUPATIONAL THERAPIST

## 2023-02-07 NOTE — TELEPHONE ENCOUNTER
Pre assessment questions completed for upcoming Colonoscopy  procedure scheduled on 02.17.2023    COVID policy reviewed.     Pre-op scheduled  N/A    Reviewed procedural arrival time 1300, procedure time 1400 and facility location [facility]; [address] [place] [address], [address] Floor, [place] [address]    Designated  policy reviewed. Instructed to have someone stay 6 hours post procedure.     Anticoagulation/blood thinners? No    Electronic implanted devices? No    Diabetic? No    Procedure indication: screening colonoscopy    Bowel prep recommendation: Standard Golytely     Reviewed procedure prep instructions.     Prep instructions sent via [employer].  Bowel prep script sent to    [facility] [id number] [place] [address].     Patient verbalized understanding and had no questions or concerns at this time.    Veronica Lafleur RN  Endoscopy Procedure Pre Assessment RN

## 2023-02-07 NOTE — PROGRESS NOTES
SOAP note objective information for 2/7/2023.  Please refer to the daily flowsheet for treatment today, total treatment time and time spent performing 1:1 timed codes.     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    Sensation   WNL throughout all nerve distributions; per patient report.    ROM  Thumb 1/10/2023 1/10/2023 2/7/2023   AROM  (PROM) Left Right Right   MP 0/65 0/55 0/55   IP 0/68 0/35 0/50   RABD 70 55 65   PABD 65 51 51   Kapandji Opposition Scale (0-10/10) Small finger distal palmar crease Small finger PIP joint NT     Index Finger 1/10/2023 1/10/2023 2/7/2023   AROM (PROM) Left Right Right   MCP 0/80 0/80 0/83   PIP 0/104 0/80 0/85   DIP 0/80 0/47 0/55   DURAN 264 207 223       Strength   (Measured in pounds)  Pain Report: - none  + mild    ++ moderate    +++ severe    1/10/2023 1/10/2023   Trials Left Right   1  2 52  62   33  51   Average 57 42     Lat Pinch 1/10/2023 1/10/2023   Trials Left Right   1 14 12     3 Pt Pinch 1/10/2023 1/10/2023   Trials Left Right   1 13 9.5       Home Exercise Program:  See flowsheet    Next Visit:  Progress ROM  Return to play

## 2023-02-17 ENCOUNTER — HOSPITAL ENCOUNTER (OUTPATIENT)
Facility: AMBULATORY SURGERY CENTER | Age: 68
Discharge: HOME | End: 2023-02-17
Attending: INTERNAL MEDICINE | Admitting: INTERNAL MEDICINE
Payer: COMMERCIAL

## 2023-02-17 VITALS
HEIGHT: 66 IN | HEART RATE: 62 BPM | BODY MASS INDEX: 29.25 KG/M2 | OXYGEN SATURATION: 100 % | DIASTOLIC BLOOD PRESSURE: 68 MMHG | TEMPERATURE: 97 F | SYSTOLIC BLOOD PRESSURE: 116 MMHG | RESPIRATION RATE: 16 BRPM | WEIGHT: 182 LBS

## 2023-02-17 LAB — COLONOSCOPY: NORMAL

## 2023-02-17 PROCEDURE — G0105 COLORECTAL SCRN; HI RISK IND: HCPCS

## 2023-02-17 RX ORDER — FENTANYL CITRATE 50 UG/ML
INJECTION, SOLUTION INTRAMUSCULAR; INTRAVENOUS PRN
Status: DISCONTINUED | OUTPATIENT
Start: 2023-02-17 | End: 2023-02-17 | Stop reason: HOSPADM

## 2023-02-17 RX ORDER — LIDOCAINE 40 MG/G
CREAM TOPICAL
Status: DISCONTINUED | OUTPATIENT
Start: 2023-02-17 | End: 2023-02-18 | Stop reason: HOSPADM

## 2023-02-17 RX ORDER — ONDANSETRON 2 MG/ML
4 INJECTION INTRAMUSCULAR; INTRAVENOUS
Status: DISCONTINUED | OUTPATIENT
Start: 2023-02-17 | End: 2023-02-18 | Stop reason: HOSPADM

## 2023-02-17 NOTE — H&P
Carol Barkley  [id number]  female  67 year old      Reason for procedure/surgery: surveillance colonoscopy     Patient Active Problem List   Diagnosis     Migraine without aura     History of colonic polyps     Raynaud phenomenon     Advanced directives, counseling/discussion     Seasonal allergic rhinitis     Elevated LDL cholesterol level     Family history of lung cancer     Family history of malignant neoplasm of breast     BMI 30.0-30.9,adult     Chronic left-sided thoracic back pain     YIP (dyspnea on exertion)     Family history of ischemic heart disease (IHD)     Night sweats     History of laminectomy     Personal history of traumatic fracture     Numbness of toes     History of gastroesophageal reflux (GERD)     Constipation, unspecified constipation type     Decreased hearing, unspecified laterality     History of COVID-19     History of shingles     Family history of colon cancer     Stiffness of finger joint of right hand     Aftercare following surgery of the musculoskeletal system       Past Surgical History:    Past Surgical History:   Procedure Laterality Date     BREAST BIOPSY, RT/LT       COLONOSCOPY  01/01/2014     COLONOSCOPY  01/08/2014    Procedure: COLONOSCOPY;  colonoscopy  ;  Surgeon: Jose Newton MD;  Location:  GI     ENT SURGERY  1964    tonsilectomy     HC REMOVAL GALLBLADDER      Cholecystectomy     HYSTERECTOMY VAGINAL  05/17/2011    w/ bilateral salphingo-oophorectomy, eterocele reapir w/ Wiley-Jimena culdoplasty, anterior colporrhaphy, posterior colpoperineorrhaphy, pubovaginal sling (mini-Arc) placement, cystoscopy and suprapubic catheter palcement     HYSTERECTOMY, PAP NO LONGER INDICATED       HYSTEROSCOPY  02/01/2007    D&C     ORTHOPEDIC SURGERY  Broken wrist 2022     SOFT TISSUE SURGERY  Tendon transfer 2022     TRANSFER, TENDON FOREARM, WRIST, HAND Right 08/05/2022    Procedure: RIGHT TENDON TRANSFER EXTENSOR INDICIS PROPRIUS TO EXTENSOR POLLICIS LONGUS;  Surgeon: Amado  MD Geraldo;  Location: [facility] OR     TUBAL LIGATION  01/01/1989     ZZC APPENDECTOMY       ZZC NARANJO W/O FACETEC FORAMOT/[facility]KC 1/2 VRT SEG, LUMBAR      L5-S1     ZZC STRESS ECHO (TREADMILL) FL  04/01/2006       Past Medical History:   Past Medical History:   Diagnosis Date     Allergic rhinitis due to other allergen     Immunotherapy     Anxiety state 4/17/2006    Problem list name updated by automated process. Provider to review     Arthritis 2015     Chalazion of right upper eyelid 03/24/2017     CKD (chronic kidney disease) stage 2, GFR 60-89 ml/min      Cough     seen by Dr Freire and ENT, CXR NL     Cystourethrocele      Disturbance of skin sensation 04/17/2006    discussed possible nerve compression with L arm numbness & L upper back pain     Extensor tendon rupture of hand, right, initial encounter 07/28/2022    Added automatically from request for surgery [id number]     Female stress incontinence 11/9/2017     GERD (gastroesophageal reflux disease) 01/09/2012     Hearing loss      Hypertension 2021     Lichen sclerosus et atrophicus of the vulva     Jama     Migraine without aura, without mention of intractable migraine without mention of status migrainosus      Migraine without status migrainosus, not intractable, unspecified migraine type 12/04/2015     PAC (premature atrial contraction) 12/2009    Holter     Pain in joint, lower leg 12/19/2011     Palpitations 05/17/2006     Personal history of colonic polyps      Pure hypercholesterolemia      PVD (posterior vitreous detachment)      Rectocele      Right wrist pain 05/01/2022     Unspecified sinusitis (chronic)      Urine, incontinence, stress female        Social History:   Social History     Tobacco Use     Smoking status: Never     Smokeless tobacco: Never   Substance Use Topics     Alcohol use: No     Comment: 1 drinks per week       Family History:   Family History   Problem Relation Age of Onset     Heart Disease Mother      Breast Cancer Mother         age  70 dx     Fuch's dystrophy Mother      C.A.D. Father      Neurologic Disorder Father         parkinsons     Hypertension Father      Hyperlipidemia Father      Depression Father         Parkinsons related     Anxiety Disorder Father         Parkinsons Disease     Hypertension Sister      Lung Cancer Sister      Fuch's dystrophy Sister      Cancer - colorectal Maternal Aunt      Cancer - colorectal Maternal Uncle      Cancer Paternal Uncle         bone     Other Cancer Cousin         Ovarian     Colon Cancer Other         Maternal Uncle     Diabetes Other         Child diabetes     Colon Cancer Other         Maternal Aunt       Allergies:   Allergies   Allergen Reactions     Dust Mite Extract      Grass        Active Medications:   Current Outpatient Medications   Medication Sig Dispense Refill     bisacodyl (DULCOLAX) 5 MG EC tablet Take 2 tablets at 3 pm the day before your procedure. If your procedure is before 11 am, take 2 additional tablets at 11 pm. If your procedure is after 11 am, take 2 additional tablets at 6 am. For additional instructions refer to your colonoscopy prep instructions. 4 tablet 0     fluticasone (FLONASE) 50 MCG/ACT nasal spray Spray 1 spray into both nostrils daily       ketoconazole (NIZORAL) 2 % external shampoo Apply topically daily as needed for itching or irritation 120 mL 6     losartan (COZAAR) 100 MG tablet Take 1 tablet (100 mg) by mouth daily 90 tablet 11     polyethylene glycol (GOLYTELY) 236 g suspension The night before the exam at 6 pm drink an 8-ounce glass every 15 minutes until the jug is half empty. If you arrive before 11 AM: Drink the other half of the Golytely jug at 11 PM night before procedure. If you arrive after 11 AM: Drink the other half of the Golytely jug at 6 AM day of procedure. For additional instructions refer to your colonoscopy prep instructions. 4000 mL 0     rosuvastatin (CRESTOR) 20 MG tablet Take 1 tablet (20 mg) by mouth daily 90 tablet 11      "SUMAtriptan (IMITREX) 25 MG tablet Take 1 tablet (25 mg) by mouth at onset of headache for migraine 9 tablet 0     clobetasol (TEMOVATE) 0.05 % ointment        fluocinolone (SYNALAR) 0.01 % external solution  (Patient not taking: Reported on 12/14/2022)       tretinoin (RETIN-A) 0.025 % cream          Systemic Review:   CONSTITUTIONAL: NEGATIVE for fever, chills, change in weight  ENT/MOUTH: NEGATIVE for ear, mouth and throat problems  RESP: NEGATIVE for significant cough or SOB  CV: NEGATIVE for chest pain, palpitations or peripheral edema    Physical Examination:   Vital Signs: BP (!) 158/94 (BP Location: Right arm)   Pulse 76   Temp 97.6  F (36.4  C) (Temporal)   Resp 18   Ht 1.676 m (5' 6\")   Wt 82.6 kg (182 lb)   LMP 11/30/2004   SpO2 98%   BMI 29.38 kg/m    GENERAL: healthy, alert and no distress  NECK: no adenopathy, no asymmetry, masses, or scars  RESP: lungs clear to auscultation - no rales, rhonchi or wheezes  CV: regular rate and rhythm, normal S1 S2, no S3 or S4, no murmur, click or rub, no peripheral edema and peripheral pulses strong  ABDOMEN: soft, nontender, no hepatosplenomegaly, no masses and bowel sounds normal  MS: no gross musculoskeletal defects noted, no edema    ASA: 2    Mallampati Score: 2    Plan: Appropriate to proceed as scheduled.      Enrico Farah MD  2/17/2023    PCP:  Andrew Nair    "

## 2023-02-21 NOTE — PROGRESS NOTES
"Virtual Visit Check-In    During this virtual visit the patient is located in [place], patient verifies this as the location during the entirety of this visit.     Carol is a 67 year old who is being evaluated via a billable video visit.      What phone number would you like to be contacted at? [phone]  How would you like to obtain your AVS? [facility]    Distant Location (provider location):  Off-site  Phone call duration: 60 minutes        74 minutes spent on the date of the encounter doing chart review, history and exam, documentation and further activities per the note    New Medical Weight Management Consult    PATIENT:  Carol Barkley  MRN:         [id number]  :         1955  NAY:         2023    Dear Esme Goetz PA-C,    I had the pleasure of seeing your patient, Carol Barkley. Full intake/assessment was done to determine barriers to weight loss success and develop a treatment plan. Carol Barkley is a 67 year old female interested in treatment of medical problems associated with excess weight. She has a height of 5' 5.75\", a weight of 185 lbs 0 oz, and the calculated Body mass index is 30.09 kg/m .        Assessment & Plan   Problem List Items Addressed This Visit        Digestive    Class 1 obesity with body mass index (BMI) of 30.0 to 30.9 in adult, unspecified obesity type, unspecified whether serious comorbidity present     Obesity onset in the past 10 years. Has had a gradaul 20lb weight gain over the past 20 years. Previously was weight stable around 150-160lbs. Has struggled to lose weight, and maintain any weight loss throughout the past 20 years. Has been very active her whole life, especially cross country skiing. Currently this is limited due to low back pain and left foot parathesia. She feels uncomfortable at this weight, and believes it is influencing her back pain and new diagnosis of HTN.     She tries to maintain a healthy diet, and has worked at removing sugar from her diet. She does " not struggle with hunger, thoughts of food, or getting/staying full. Some mild anxious eating at times, but is very minimal.     Had a large discussion of weight loss through diet changes +/- a medication. With her age, I am concerned with starting Phentermine. While her kidney function is normal, she has baseline parathesia that I am concerned could worsen with starting Topirmate. Both are contraindicated at this time.     Discussed starting Naltrexone. No contraindications. Not taking any narcotics. No history of liver disease. She does have some minimal cravings. Discussed side effects, risks, and benefits.     Discussed starting a GLP-1. No contraindications. Discussed risks, benefits, and side effects. Will monitor for any changes in constipation if started. Put in a prescription for wegovy to see if it would be covered by insurance.     Patient will reach out if she would like to start a medication. [employer] sent on more information about both GLP-1 and Naltrexone.              Relevant Medications    Semaglutide-Weight Management (WEGOVY) 0.25 MG/0.5ML SOAJ    Semaglutide-Weight Management (WEGOVY) 0.5 MG/0.5ML SOAJ (Start on 3/22/2023)      1. Discussed possibly starting a medication today - naltrexone or wegovy. She will reach out if she would like to start one. Will see prescribed Wegovy to see if it is covered by insurance at this time.   2. Follow up with Barbara Hall in 6 weeks.   3. Follow up with dietitian as needed.             Carol Barkley is a 67 year old female who presents to clinic today for the following health issues.     She has the following co-morbidities:       2/21/2023   I have the following health issues associated with obesity: High Blood Pressure, High Cholesterol, GERD (Reflux)   I have the following symptoms associated with obesity: Lower Extremity Swelling, Back Pain       Migraine - since she was 2 yo. Well controlled with abortive medications. Has never taken a preventative  "medications. Followed by PCP     HTN - just started medications in December. Does not check BP at home. Followed by PCP     HLD - just started medication in December. Followed by cardiology.     Chronic low back with radiation of numbness to toes - limits mobility. History laminectomy     Dyspnea on exertion - gradually has worsened over time. May have been exacerbated by COVID in September     Constipation - well controlled with coffee and stool softner.     Raynauds - fingers sting, especially int the cold.     Patient Goals 2/21/2023   I am interested in having a healthier weight to diminish current health problems: Yes   I am interested in having a healthier weight in order to prevent future health problems: Yes   I am interested in having a healthier weight in order to have a future surgery: No       Referring Provider 2/21/2023   Please name the provider who referred you to Medical Weight Management.  If you do not know, please answer: \"I Don't Know\". Esme Goetz       Weight History 2/21/2023   How concerned are you about your weight? Very Concerned   Would you describe your weight gain as gradual? Yes   I became overweight: As an Adult   The following factors have contributed to my weight gain:  Started on Medication that Caused Weight Gain, Eating Too Much, Lack of Exercise   I have tried the following methods to lose weight: Watching Portions or Calories, Exercise, Atkins-type Diet (Low Carb/High Protein)   My lowest weight since age 17 was: 150   My highest weight since age 17 was: 186   The most weight I have ever lost was: (lbs) 10   I have the following family history of obesity/being overweight:  My mother is overweight, One or more of my siblings are overweight, Many of my relatives are overweight   Has anyone in your family had weight loss surgery? No   How has your weight changed over the last year?  Gained       [place], so was very active. Weight has been stable most of her life. " "Weight increased through 3 pregnancies. After menopause has not been able to lose any weight. Is having low back pain, with numbness in left toes, that is limiting mobility and activity. Weight gain has been gradual. Is usually a big walker and cross country skier. Has always been hard to lose weight through most of her life, even with strict diet and exercise. Wants to lose weight now in order to help with low back pain and new diagnosis of HTN.     Weight today - 185lbs, highest weight in life.     Eating 3 meals a day, with snacks. Will snack due to headaches or energy boost. Tries to snack on fruit. Tries to not snack on sweets. Has always watched what she eats. Does not drink pop or juice, tries to stay away from \"junk food\". Feels no compulsion to finish a bag of chips. Will eat out of anxiety at times, but very rarely. No increased thoughts of food. Craves chocolate. Can get full and stay full. Has cut back on sugary coffees drinks.     Activity - limited due to chronic back pain and numbness in left foot    Has not tried AOMs in the past.     Diet Recall Review with Patient 2/21/2023   Do you typically eat breakfast? Yes   If you do eat breakfast, what types of food do you eat? eggs or oatmeal and coffee with milk a piece of fruit   Do you typically eat lunch? Yes   If you do eat lunch, what types of food do you typically eat?  soup, or salad or sandwich or leftovers   Do you typically eat supper? Yes   If you do eat supper, what types of food do you typically eat? hotdish or soup, or protein and vegetable   Do you typically eat snacks? Yes   If you do snack, what types of food do you typically eat? vegies, a cookie, some nuts   Do you like vegetables?  Yes   Do you drink water? Yes   How many glasses of juice do you drink in a typical day? 0   How many of glasses of milk do you drink in a typical day? 1   If you do drink milk, what type? Skim   How many 8oz glasses of sugar containing drinks such as " Mason-Aid/sweet tea do you drink in a day? 0   How many cans/bottles of sugar pop/soda/tea/sports drinks do you drink in a day? 0   How many cans/bottles of diet pop/soda/tea or sports drink do you drink in a day? 0   How often do you have a drink of alcohol? Monthly or Less       Eating Habits 2/21/2023   Generally, my meals include foods like these: bread, pasta, rice, potatoes, corn, crackers, sweet dessert, pop, or juice. Almost Everyday   Generally, my meals include foods like these: fried meats, brats, burgers, french fries, pizza, cheese, chips, or ice cream. Less Than Weekly   Eat fast food (like McDonalds, [employer], Taco Bell). Never   Eat at a buffet or sit-down restaurant. Never   Eat most of my meals in front of the TV or computer. A Few Times a Week   Often skip meals, eat at random times, have no regular eating times. Once a Week   Rarely sit down for a meal but snack or graze throughout.  Never   Eat extra snacks between meals. Almost Everyday   Eat most of my food at the end of the day. A Few Times a Week   Eat in the middle of the night or wake up at night to eat. Never   Eat extra snacks to prevent or correct low blood sugar. A Few Times a Week   Eat to prevent acid reflux or stomach pain. Once a Week   Worry about not having enough food to eat. Never   Have you been to the food shelf at least a few times this year? No   I eat when I am depressed. Once a Week   I eat when I am stressed. Once a Week   I eat when I am bored. Once a Week   I eat when I am anxious. Never   I eat when I am happy or as a reward. Less Than Weekly   I feel hungry all the time even if I just have eaten. Less Than Weekly   Feeling full is important to me. Once a Week   I finish all the food on my plate even if I am already full. A Few Times a Week   I can't resist eating delicious food or walk past the good food/smell. Once a Week   I eat/snack without noticing that I am eating. A Few Times a Week   I eat when I am  preparing the meal. Less Than Weekly   I eat more than usual when I see others eating. Once a Week   I have trouble not eating sweets, ice cream, cookies, or chips if they are around the house. A Few Times a Week   I think about food all day. Never   What foods, if any, do you crave? Sweets/Candy/Chocolate       Amount of Food 2/21/2023   I make myself vomit what I have eaten or use laxatives to get rid of food. Never   I eat a large amount of food, like a loaf of bread, a box of cookies, a pint/quart of ice cream, all at once. Never   I eat a large amount of food even when I am not hungry. Weekly   I eat rapidly. Monthly   I eat alone because I feel embarrassed and do not want others to see how much I have eaten. Never   I eat until I am uncomfortably full. Never   I feel bad, disgusted, or guilty after I overeat. Monthly   I make myself vomit what I have eaten or use laxatives to get rid of food. Never       Activity/Exercise History 2/21/2023   How much of a typical 12 hour day do you spend lying down? Less Than Half the Day   How much of a typical day do you spend walking/standing? Less Than Half the Day   How many hours (not including work) do you spend on the TV/Video Games/Computer/Tablet/Phone? 2-3 Hours   How many times a week are you active for the purpose of exercise? 4-5 TImes a Week   What keeps you from being more active? Pain   How many total minutes do you spend doing some activity for the purpose of exercising when you exercise? 15-30 Minutes       PAST MEDICAL HISTORY:  Past Medical History:   Diagnosis Date     Allergic rhinitis due to other allergen     Immunotherapy     Anxiety state 4/17/2006    Problem list name updated by automated process. Provider to review     Arthritis 2015     Chalazion of right upper eyelid 03/24/2017     CKD (chronic kidney disease) stage 2, GFR 60-89 ml/min      Cough     seen by Dr Freire and ENT, CXR NL     Cystourethrocele      Disturbance of skin sensation  04/17/2006    discussed possible nerve compression with L arm numbness & L upper back pain     Extensor tendon rupture of hand, right, initial encounter 07/28/2022    Added automatically from request for surgery [id number]     Female stress incontinence 11/9/2017     GERD (gastroesophageal reflux disease) 01/09/2012     Hearing loss      Hypertension 2021     Lichen sclerosus et atrophicus of the vulva     Jama     Migraine without aura, without mention of intractable migraine without mention of status migrainosus      Migraine without status migrainosus, not intractable, unspecified migraine type 12/04/2015     PAC (premature atrial contraction) 12/2009    Holter     Pain in joint, lower leg 12/19/2011     Palpitations 05/17/2006     Personal history of colonic polyps      Pure hypercholesterolemia      PVD (posterior vitreous detachment)      Rectocele      Right wrist pain 05/01/2022     Unspecified sinusitis (chronic)      Urine, incontinence, stress female        Work/Social History Reviewed With Patient 2/21/2023   My employment status is: Retired   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 Less Than 50%   How many hours do you spend commuting to work daily?  ? I am free valeriy currently,so unpredictable.   What is your marital status? /In a Relationship   If in a relationship, is your significant other overweight? No   Do you have children? Yes   If you have children, are they overweight? No   Who do you live with?     Are they supportive of your health goals? Yes   Who does the food shopping?  Mostly myself, many times with .     Currently yeny works, chooses what she wants. Maybe works 1-2xweek. Has worked with [employer] most of her career. Has 3 children, and 3 grandchild. Very supportive  and family.     ETOH - rarely   No nicotoine, drugs, THC     Mental Health History Reviewed With Patient 2/21/2023   Have  you ever been physically or sexually abused? No   If yes, do you feel that the abuse is affecting your weight? N/A   If yes, would you like to talk to a counselor about the abuse? N/A   How often in the past 2 weeks have you felt little interest or pleasure in doing things? Not at all   Over the past 2 weeks how often have you felt down, depressed, or hopeless? Not at all       Sleep History Reviewed With Patient 2/21/2023   How many hours do you sleep at night? 8   Do you think that you snore loudly or has anybody ever heard you snore loudly (louder than talking or so loud it can be heard behind a shut door)? No   Has anyone seen or heard you stop breathing during your sleep? No   Do you often feel tired, fatigued, or sleepy during the day? No   Do you have a TV/Computer in your bedroom? No       MEDICATIONS:   Current Outpatient Medications   Medication Sig Dispense Refill     clobetasol (TEMOVATE) 0.05 % ointment        fluticasone (FLONASE) 50 MCG/ACT nasal spray Spray 1 spray into both nostrils daily       ketoconazole (NIZORAL) 2 % external shampoo Apply topically daily as needed for itching or irritation 120 mL 6     losartan (COZAAR) 100 MG tablet Take 1 tablet (100 mg) by mouth daily 90 tablet 11     rosuvastatin (CRESTOR) 20 MG tablet Take 1 tablet (20 mg) by mouth daily 90 tablet 11     Semaglutide-Weight Management (WEGOVY) 0.25 MG/0.5ML SOAJ Inject 0.25 mg Subcutaneous once a week For 4 weeks 2 mL 0     [START ON 3/22/2023] Semaglutide-Weight Management (WEGOVY) 0.5 MG/0.5ML SOAJ Inject 0.5 mg Subcutaneous once a week After completed 4 week 0.25mg 2 mL 1     SUMAtriptan (IMITREX) 25 MG tablet Take 1 tablet (25 mg) by mouth at onset of headache for migraine 9 tablet 0     tretinoin (RETIN-A) 0.025 % cream        fluocinolone (SYNALAR) 0.01 % external solution  (Patient not taking: Reported on 12/14/2022)         ALLERGIES:   Allergies   Allergen Reactions     Dust Mite Extract      Grass      Mold   "            Objective    Ht 1.67 m (5' 5.75\")   Wt 83.9 kg (185 lb)   LMP 11/30/2004   BMI 30.09 kg/m           Vitals:  No vitals were obtained today due to virtual visit.    Physical Exam   GENERAL: Healthy, alert and no distress  EYES: Eyes grossly normal to inspection.  No discharge or erythema, or obvious scleral/conjunctival abnormalities.  RESP: No audible wheeze, cough, or visible cyanosis.  No visible retractions or increased work of breathing.    SKIN: Visible skin clear. No significant rash, abnormal pigmentation or lesions.  NEURO: Cranial nerves grossly intact.  Mentation and speech appropriate for age.  PSYCH: Mentation appears normal, affect normal/bright, judgement and insight intact, normal speech and appearance well-groomed.     Anti-obesity medication ROS:    HEENT  Hx of glaucoma: No    Cardiovascular  CAD:No  HTN:Yes    Gastrointestinal  GERD:Yes, wont eat before bed.   Constipation:Yes  Liver Dz:No  H/O Pancreatitis:No    Psychiatric  Bipolar: No  Anxiety:No  Depression:No  History of alcohol/drug abuse: No  Hx of eating disorder:No    Endocrine  Personal or family hx of MTC or MEN2:No  Diabetes/prediabetes: No    Neurologic:  Hx of seizures: No  Hx of migraines: Yes  Memory Impairment: No      History of kidney stones: No  Kidney disease: No  Current birth control: post-menopausal       Sincerely,    MARLENY REY PA-C        "

## 2023-02-22 ENCOUNTER — TELEPHONE (OUTPATIENT)
Dept: ENDOCRINOLOGY | Facility: CLINIC | Age: 68
End: 2023-02-22

## 2023-02-22 ENCOUNTER — VIRTUAL VISIT (OUTPATIENT)
Dept: ENDOCRINOLOGY | Facility: CLINIC | Age: 68
End: 2023-02-22
Attending: PHYSICIAN ASSISTANT
Payer: COMMERCIAL

## 2023-02-22 ENCOUNTER — VIRTUAL VISIT (OUTPATIENT)
Dept: ENDOCRINOLOGY | Facility: CLINIC | Age: 68
End: 2023-02-22
Payer: COMMERCIAL

## 2023-02-22 VITALS — WEIGHT: 185 LBS | BODY MASS INDEX: 29.73 KG/M2 | HEIGHT: 66 IN

## 2023-02-22 DIAGNOSIS — Z71.3 NUTRITIONAL COUNSELING: Primary | ICD-10-CM

## 2023-02-22 DIAGNOSIS — E66.811 CLASS 1 OBESITY WITH BODY MASS INDEX (BMI) OF 30.0 TO 30.9 IN ADULT, UNSPECIFIED OBESITY TYPE, UNSPECIFIED WHETHER SERIOUS COMORBIDITY PRESENT: ICD-10-CM

## 2023-02-22 DIAGNOSIS — E66.9 OBESITY: ICD-10-CM

## 2023-02-22 PROCEDURE — 97802 MEDICAL NUTRITION INDIV IN: CPT | Mod: VID | Performed by: DIETITIAN, REGISTERED

## 2023-02-22 PROCEDURE — 99205 OFFICE O/P NEW HI 60 MIN: CPT | Mod: VID

## 2023-02-22 RX ORDER — SEMAGLUTIDE 0.25 MG/.5ML
0.25 INJECTION, SOLUTION SUBCUTANEOUS WEEKLY
Qty: 2 ML | Refills: 0 | Status: SHIPPED | OUTPATIENT
Start: 2023-02-22 | End: 2023-12-04

## 2023-02-22 RX ORDER — SEMAGLUTIDE 0.5 MG/.5ML
0.5 INJECTION, SOLUTION SUBCUTANEOUS WEEKLY
Qty: 2 ML | Refills: 1 | Status: SHIPPED | OUTPATIENT
Start: 2023-03-22 | End: 2023-12-04

## 2023-02-22 NOTE — TELEPHONE ENCOUNTER
Confirmed via test claim, pa needed on [employer] [place] [employer] [facility] [employer] insurance.  BIN:[id number] PCN:[id number] [id number] ID:[id number]     Please initiate PA.

## 2023-02-22 NOTE — ASSESSMENT & PLAN NOTE
Obesity onset in the past 10 years. Has had a gradaul 20lb weight gain over the past 20 years. Previously was weight stable around 150-160lbs. Has struggled to lose weight, and maintain any weight loss throughout the past 20 years. Has been very active her whole life, especially cross country skiing. Currently this is limited due to low back pain and left foot parathesia. She feels uncomfortable at this weight, and believes it is influencing her back pain and new diagnosis of HTN.     She tries to maintain a healthy diet, and has worked at removing sugar from her diet. She does not struggle with hunger, thoughts of food, or getting/staying full. Some mild anxious eating at times, but is very minimal.     Had a large discussion of weight loss through diet changes +/- a medication. With her age, I am concerned with starting Phentermine. While her kidney function is normal, she has baseline parathesia that I am concerned could worsen with starting Topirmate. Both are contraindicated at this time.     Discussed starting Naltrexone. No contraindications. Not taking any narcotics. No history of liver disease. She does have some minimal cravings. Discussed side effects, risks, and benefits.     Discussed starting a GLP-1. No contraindications. Discussed risks, benefits, and side effects. Will monitor for any changes in constipation if started. Put in a prescription for wegovy to see if it would be covered by insurance.     Patient will reach out if she would like to start a medication. [email or web address] sent on more information about both GLP-1 and Naltrexone.

## 2023-02-22 NOTE — LETTER
"2/22/2023       RE: Carol Barkley  [address]  [facility] [address]     Dear Colleague,    Thank you for referring your patient, Carol Barkley, to the [facility] [place] at [facility]. Please see a copy of my visit note below.    Carol Barkley is a 67 year old female who is being evaluated via a billable video visit.      The patient has been notified of following:     \"This video visit will be conducted via a call between you and your physician/provider. We have found that certain health care needs can be provided without the need for an in-person physical exam.  This service lets us provide the care you need with a video conversation.  If a prescription is necessary we can send it directly to your pharmacy.  If lab work is needed we can place an order for that and you can then stop by our lab to have the test done at a later time.    Video visits are billed at different rates depending on your insurance coverage.  Please reach out to your insurance provider with any questions.    If during the course of the call the physician/provider feels a video visit is not appropriate, you will not be charged for this service.\"    Patient has given verbal consent for Video visit? Yes  How would you like to obtain your AVS? MyChart  If you are dropped from the video visit, the video invite should be resent to: Text to cell phone: [phone]  Will anyone else be joining your video visit? No  {If patient encounters technical issues they should call [phone]      Video-Visit Details    Type of service:  Video Visit    Video Start Time: 1:00 PM  Video End Time: 1:26 PM    Originating Location (pt. Location): Home    Distant Location (provider location):  Offsite (providers home)      Platform used for Video Visit: [employer]    During this virtual visit the patient is located in [place], patient verifies this as the location during the entirety " "of this visit.       New Weight Management Nutrition Consultation    Carol Barkley is a 67 year old female presents today for new weight management nutrition consultation.  Patient referred by DAVID Angeles on February 22, 2023.    Patient with Co-morbidities of obesity including:  High Blood Pressure, High Cholesterol, GERD    Anthropometrics:  Estimated body mass index is 29.38 kg/m  as calculated from the following:    Height as of 2/17/23: 1.676 m (5' 6\").    Weight as of 2/17/23: 82.6 kg (182 lb).     Current weight:     Medications for Weight Loss:  Considering options    NUTRITION HISTORY  Food allergies: none  Food intolerances: none- dislikes fatty foods   Supplements: none  Previous methods of diet modification for weight loss: Watching Portions or Calories, Exercise, Atkins-type Diet (Low Carb/High Protein)    Pt reports she mainly struggles with time and cravings/snacking- uses salted nut rolls for a pick me up because she feels very tired in the afternoons.   Tries not to eat after 7 pm.     Recent food recall:  Eating 3 meals a day, with snacks. Will snack due to headaches or energy boost. Tries to snack on fruit. Tries to not snack on sweets. Has always watched what she eats. Does not drink pop or juice, tries to stay away from \"junk food\". Feels no compulsion to finish a bag of chips. Will eat out of anxiety at times, but very rarely. No increased thoughts of food. Craves chocolate. Can get full and stay full. Has cut back on sugary coffees drinks.     Physical Activity:  Limited d/t back pain   Hoping that weight loss will make movement easier     Nutrition Prescription  Recommended energy/nutrient modification.    Nutrition Diagnosis  Obesity r/t long history of positive energy balance aeb BMI >30.    Nutrition Intervention  Materials/education provided on hypocaloric diet for weight loss. Discussed Volumetric eating to help satiety level on fewer calories; portion control and healthy food " "choices (Plate Method and Volumetrics handouts), 100 calorie snack choices, meal and snack planning and websites, sample meal plans     Patient demonstrates understanding.    Expected Engagement: good     Nutrition Goals  1) Use a healthy snack between lunch and dinner (see ideas below)  2) 9\" Plate method (1/2 plate non-starchy vegetables/fruit, 1/4 plate lean protein, 1/4 plate whole grain starch - no more than 1/2 cup carb/meal)    -Portion Control Without Measuring   [email or web address]   3) Protein Sources   [email or web address]   4) Mediterranean Diet: https://www.health.[place].edu/blog/a-practical-guide-to-the-mediterranean-diet-7940630021195  5) Veggie recipes: https://www.[employer]/topics/vegetables    Snack examples:   - 2 cup popcorn   - 1 cup mixed berries   - 15 almonds, walnuts, cashews   - carrot/celery sticks and 2 tbsp low-fat ranch   - 1 hard boiled egg   - Part-skim mozzarella cheese stick   - Low-fat, low-sugar greek yogurt with 1/2 cup berries   - Med apple or pear   - sliced bell peppers with 1/2 cup salsa   - 1/2 cup roasted chickpeas   - sliced cucumbers with vinegar    100 calorie sweets: Smart Sweets, Dr. Maxwell's Xylitol candy, Fiber One desserts, Fit and Active 100 calorie snack sweets at [employer]; [employer] 100 calorie pre-portioned cookies, sugar-free pudding, sugar-free jello.    Snack Recipes:  - Banana and creamy PB dip (https://www.diabetesfoodhub.org/recipes/sweet-peanut-buttery-dip.html?home-category_id=23)  - Roasted chickpeas (https://www.diabetesfoodhub.org/recipes/roasted-and-spiced-chickpeas.html?home-category_id=23)  - Lemon Raspberry natalie seed pudding (https://www.diabetesfoodhub.org/recipes/lemon-raspberry- natalie-seed-pudding.html?home-category_id=23)  - Black bean hummus with carrot and celery sticks (https://www.diabetesfoodhub.org/recipes/black-bean-hummus.html?home-category_id=23)  - Greek yogurt chocolate mouse " (https://www.diabetesfoodhub.org/recipes/greek-yogurt-chocolate-mousse.html?home-category_id=23)   - Broccoli Cheese Bites  (https://www.diabetesfoodGeo Renewablesb.org/recipes/broccoli-cheese-bites.html?home-category_id=20)  - Chicken Satay with peanut sauce  (https://www.diabetesfoChangers.org/recipes/blueberry-almond-chicken-salad-lettuce-wraps.html?home-category_id=20)  - Deviled Eggs  (https://www.[employer].org/recipes/devilled-eggs.html?home-category_id=20)      The Plate Method  [email or web address]://[email or web address]    Carbohydrates  [email or web address]     Mindful Eating  [email or web address]://[email or web address]     Summary of Volumetrics Eating Plan  [email or web address]     Follow-Up:  1 month, PRN    Time spent with patient: 26 minutes.  ALFONSO MATSON RD, LD

## 2023-02-22 NOTE — PROGRESS NOTES
"Carol Barkley is a 67 year old female who is being evaluated via a billable video visit.      The patient has been notified of following:     \"This video visit will be conducted via a call between you and your physician/provider. We have found that certain health care needs can be provided without the need for an in-person physical exam.  This service lets us provide the care you need with a video conversation.  If a prescription is necessary we can send it directly to your pharmacy.  If lab work is needed we can place an order for that and you can then stop by our lab to have the test done at a later time.    Video visits are billed at different rates depending on your insurance coverage.  Please reach out to your insurance provider with any questions.    If during the course of the call the physician/provider feels a video visit is not appropriate, you will not be charged for this service.\"    Patient has given verbal consent for Video visit? Yes  How would you like to obtain your AVS? MyChart  If you are dropped from the video visit, the video invite should be resent to: Text to cell phone: [phone]  Will anyone else be joining your video visit? No  {If patient encounters technical issues they should call [phone]      Video-Visit Details    Type of service:  Video Visit    Video Start Time: 1:00 PM  Video End Time: 1:26 PM    Originating Location (pt. Location): Home    Distant Location (provider location):  Offsite (providers home)      Platform used for Video Visit: [employer]    During this virtual visit the patient is located in [place], patient verifies this as the location during the entirety of this visit.       New Weight Management Nutrition Consultation    Carol Barkley is a 67 year old female presents today for new weight management nutrition consultation.  Patient referred by DAVID Angeles on February 22, 2023.    Patient with Co-morbidities of obesity including:  High Blood Pressure, High " "Cholesterol, GERD    Anthropometrics:  Estimated body mass index is 29.38 kg/m  as calculated from the following:    Height as of 2/17/23: 1.676 m (5' 6\").    Weight as of 2/17/23: 82.6 kg (182 lb).     Current weight:     Medications for Weight Loss:  Considering options    NUTRITION HISTORY  Food allergies: none  Food intolerances: none- dislikes fatty foods   Supplements: none  Previous methods of diet modification for weight loss: Watching Portions or Calories, Exercise, Atkins-type Diet (Low Carb/High Protein)    Pt reports she mainly struggles with time and cravings/snacking- uses salted nut rolls for a pick me up because she feels very tired in the afternoons.   Tries not to eat after 7 pm.     Recent food recall:  Eating 3 meals a day, with snacks. Will snack due to headaches or energy boost. Tries to snack on fruit. Tries to not snack on sweets. Has always watched what she eats. Does not drink pop or juice, tries to stay away from \"junk food\". Feels no compulsion to finish a bag of chips. Will eat out of anxiety at times, but very rarely. No increased thoughts of food. Craves chocolate. Can get full and stay full. Has cut back on sugary coffees drinks.     Physical Activity:  Limited d/t back pain   Hoping that weight loss will make movement easier     Nutrition Prescription  Recommended energy/nutrient modification.    Nutrition Diagnosis  Obesity r/t long history of positive energy balance aeb BMI >30.    Nutrition Intervention  Materials/education provided on hypocaloric diet for weight loss. Discussed Volumetric eating to help satiety level on fewer calories; portion control and healthy food choices (Plate Method and Volumetrics handouts), 100 calorie snack choices, meal and snack planning and websites, sample meal plans     Patient demonstrates understanding.    Expected Engagement: good     Nutrition Goals  1) Use a healthy snack between lunch and dinner (see ideas below)  2) 9\" Plate method (1/2 " plate non-starchy vegetables/fruit, 1/4 plate lean protein, 1/4 plate whole grain starch - no more than 1/2 cup carb/meal)    -Portion Control Without Measuring   [email or web address]   3) Protein Sources   [email or web address]://[email or web address]   4) Mediterranean Diet: https://www.health.[place].[facility]/blog/a-practical-guide-to-the-mediterranean-diet-7318208561129  5) Veggie recipes: https://www.[employer]/topics/vegetables    Snack examples:   - 2 cup popcorn   - 1 cup mixed berries   - 15 almonds, walnuts, cashews   - carrot/celery sticks and 2 tbsp low-fat ranch   - 1 hard boiled egg   - Part-skim mozzarella cheese stick   - Low-fat, low-sugar greek yogurt with 1/2 cup berries   - Med apple or pear   - sliced bell peppers with 1/2 cup salsa   - 1/2 cup roasted chickpeas   - sliced cucumbers with vinegar    100 calorie sweets: Smart Sweets, Dr. Decker Xylitol candy, Fiber One desserts, Fit and Active 100 calorie [facility]ets at [facility]; [employer] 100 calorie pre-portioned cookies, sugar-free pudding, sugar-free jello.    Snack Recipes:  - Banana and creamy PB dip (https://www.diabetesfoodhub.org/recipes/sweet-peanut-buttery-dip.html?home-category_id=23)  - Roasted chickpeas (https://www.diabetesfoodhub.org/recipes/roasted-and-spiced-chickpeas.html?home-category_id=23)  - Lemon Raspberry natalie seed pudding (https://www.diabetesfoodhub.org/recipes/lemon-raspberry- natalie-seed-pudding.html?home-category_id=23)  - Black bean hummus with carrot and celery sticks (https://www.diabetesfoodhub.org/recipes/black-bean-hummus.html?home-category_id=23)  - Greek yogurt chocolate mouse (https://www.diabetesfoodhub.org/recipes/greek-yogurt-chocolate-mousse.html?home-category_id=23)   - Broccoli Cheese Bites  (https://www.diabetesfoodhub.org/recipes/broccoli-cheese-bites.html?home-category_id=20)  - Chicken Satay with peanut  sauce  (https://www.diabetesfoodhub.org/recipes/blueberry-almond-chicken-salad-lettuce-wraps.html?home-category_id=20)  - Deviled Eggs  (https://www.diabetesfoodhub.org/recipes/devilled-eggs.html?home-category_id=20)      The Plate Method  Http://[email or web address].[email or web address]    Carbohydrates  [email or web address]://[email or web address]     Mindful Eating  [email or web address]://[email or web address]     Summary of Volumetrics Eating Plan  [email or web address]     Follow-Up:  1 month, PRN    Time spent with patient: 26 minutes.  ALFONSO MATSON RD, LD

## 2023-02-22 NOTE — PATIENT INSTRUCTIONS
"Thank you for allowing us the privilege of caring for you. We hope we provided you with the excellent service you deserve.   Please let us know if there is anything else we can do for you so that we can be sure you are completely satisfied with your care experience.    To ensure the quality of our services you may be receiving a patient satisfaction survey from an independent patient satisfaction monitoring company.    The greatest compliment you can give is a \"Likely to Recommend\"    Your visit was with MARLENY REY PA-C today.    Instructions per today's visit:     Jackson Barkley, it was great to visit with you today.  Here is a review of our visit.  If our clinic scheduler is not able to reach you please call [phone] to schedule your next appointments.    1. Discussed possibly starting a medication today - naltrexone or wegovy. She will reach out if she would like to start one. Will see prescribed Wegovy to see if it is covered by insurance at this time.   2. Follow up with Marleny Hall in 6 weeks.   3. Follow up with dietitian as needed.       Information about Video Visits with [employer]: video visit information  _________________________________________________________________________________________________________________________________________________________  If you are asked by your clinic team to have your blood pressure checked:  [facility] do offer several locations for blood pressure checks. Please follow the below link to schedule an appointment. Scheduling an appointment at the pharmacy for a blood pressure check is now preferred.    Appointment Plus (appointment-plus.Someecards)  _________________________________________________________________________________________________________________________________________________________  Important contact and scheduling information:  Please call our contact center at [phone] to schedule your next appointments.  To find a lab location " near you, please call [phone].  For any nursing questions or concerns call Magda Layton LPN at [phone] or Noemi Felipe RN at [phone]  Please call during clinic hours Monday through Friday 8:00a - 4:00p if you have questions or you can contact us via Driver Hirehart at anytime and we will reply during clinic hours.    Lab results will be communicated through My Chart or letter (if My Chart not used). Please call the clinic if you have not received communication after 1 week or if you have any questions.?  Clinic Fax: [phone]    _________________________________________________________________________________________________________________________________________________________  Meal Replacement Products:    Here is the link to our new e-store where you can purchase our meal replacement products    [facility] E-Store  [employer].Tus reQRdos/store    The one week starter kit is a great way to sample a variety of products and see what works for you.    If you want more information about the product go to: Fresh Steps Meals  [employer] [place].Neocleus    If you are an employee or [facility] or [facility] please contact your care team for a 10% estore discount    Free Shipping for orders over $75     Benefits of meal replacements products:    Portion and calorie control  Improved nutrition  Structured eating  Simplified food choices  Avoid contact with trigger foods  _________________________________________________________________________________________________________________________________________________________  Interested in working with a health ?  Health coaches work with you to improve your overall health and wellbeing.  They look at the whole person, and may involve discussion of different areas of life, including, but not limited to the four pillars of health (sleep, exercise, nutrition, and stress management). Discuss with your care team if you would like to  start working a health .  Health Coaching-3 Pack: Schedule by calling [phone]    $99 for three health coaching visits    Visits may be done in person or via phone    Coaching is a partnership between the  and the client; Coaches do not prescribe or diagnose    Coaching helps inspire the client to reach his/her personal goals   _________________________________________________________________________________________________________________________________________________________  24 Week Healthy Lifestyle Plan:    Our mission in the 24-week Healthy Lifestyle Plan is to provide you with individualized care by giving you the tools, education and support you need to lose weight and maintain a healthy lifestyle. In your 24-week journey, you ll be supported by a dedicated weight loss team that includes registered dietitians, medical weight management providers, health coaches, and nurses -- all with special expertise in weight loss -- to help you every step of the way.     Monthly meetings with your registered dietician or medical weight management provider help to review your progress, update your care plan, and make any adjustments needed to ensure success. Between these visits, weekly and bi-weekly health  visits will help you focus on the four pillars of weight loss -- stress, sleep, nutrition, and exercise -- and how you can best adapt each to achieve sustainable weight loss results.    In addition, you will be given exclusive access to online wellbeing classes through [employer].  Your initial visit will be with a medical weight management provider who will help to understand your weight loss goals and ensure this program is the right fit for you. Please let our team know if you are interested in the 24 week plan by sending a message to your care team or calling [phone] to  "schedule.  _________________________________________________________________________________________________________________________________________________________    COMPREHENSIVE WEIGHT MANAGEMENT PROGRAM  VIRTUAL SUPPORT GROUPS    For Support Group Information:      We offer support groups for patients who are working on weight loss and considering, preparing for or have had weight loss surgery.   There is no cost for this opportunity.  You are invited to attend the?Virtual Support Groups?provided by any of the following locations:    [facility] via Microsoft Teams with Radha Waldron RN  2.   [place] via [employer] with Gonzalo Parra, PhD, LP  3.   [place] via [employer] with Sydni Mccabe RN  4.   [facility] via [place] Teams with Sydni Talbot [facility]    The following Support Group information can also be found on our website:  [email or web address]    [facility] Weight Loss Surgery Support Group    [facility] Weight Loss Surgery Support Group  The support group is a patient-lead forum that meets monthly to share experiences, encouragement and education. It is open to those who have had weight loss surgery, are scheduled for surgery, and those who are considering surgery.   WHEN: This group meets on the 3rd Wednesday of each month from 5:00PM - 6:00PM virtually using Microsoft Teams.   FACILITATOR: Led by Radha Waldron, MADAI, LD, RN, the program's Clinical Coordinator.   TO REGISTER: Please contact the clinic via [employer] or call the nurse line directly at [phone] to inform our staff that you would like an invite sent to you and to let us know the email you would like the invite sent to. Prior to the meeting, a link with directions on how to join the meeting will be sent to you.    2022 Meetings  Edith 15: \"Let's Talk\" a time for the group to share.  July 20: \"Let's Talk\" a time for the group to " "share.  August 17: \"Let's Talk\" a time for the group to share.  September 21: \"Let's Talk\" a time for the group to share.  October 19: Guest Speaker: Dr Delgado Arguelles MD Pulmonologist and Sleep Medicine Physician, \"Getting a Good Night's Sleep\".  November 16: \"Let's Talk\" a time for the group to share.  December 21: \"Let's Talk\" a time for the group to share.    [facility] Support Groups    Connections: Bariatric Care Support Group?  This is open to all [facility] (and those external to this program) pre- and post- operative bariatric surgery patients as well as their support system.   WHEN: This group meets the 2nd Tuesday of each month from 6:30 PM - 8:00 PM virtually using Microsoft Teams.   FACILITATOR: Led by Gonzalo Parra, Ph.D who is a Licensed Psychologist with the [facility] Comprehensive Weight Management Program.   TO REGISTER: Please send an email to Gonzalo Parra, Ph.D., LP at?[email or web address]?if you would like an invitation to the group and to learn about using Microsoft Teams.    2022 Meetings  June 14: Holly Salazar RD, LD at [facility], \"Nutritional Labeling\"  July 12 August 2 (Please Note Date Change)  September 13 October 11 November 8 December 13    Connections: Post-Operative Bariatric Surgery Support Group  This is a support group for [facility] bariatric patients (and those external to [facility]) who have had bariatric surgery and are at least 3 months post-surgery.  WHEN: This support group meets the 4th Wednesday of the month from 11:00 AM - 12:00 PM virtually using Microsoft Teams.   FACILITATOR: Led by Certified Bariatric Nurse, Sydni Mccabe RN.   TO REGISTER: Please send an email to Sydni at [email or web address] if you would like an invitation to the group and to learn about using Microsoft Teams.    2022 Meetings June 22 July 27 August 24 September 28 October 26 November 23 December " "28      [facility] Healthy Lifestyle Virtual Support Group    Healthy Lifestyle Virtual Support Group?  This is 60 minutes of small group guided discussion, support and resources. All are welcome who want a healthy lifestyle.  WHEN: This group meets monthly on a Friday from 12:30 PM - 1:30 PM virtually using Microsoft Teams.   FACILITATOR: Led by National Board Certified Health and , Sydni [facility]bot [facility].   TO REGISTER: Please send an email to Sydni [email or web address] to receive monthly invites to the group or if you have any questions about having a health .  Prior to the meeting, a link with directions on how to join the meeting will be sent to you.    2022 Meetings  June 24: Sydni [facility], \"Setting Limits and Boundaries\".  Jul 29: Open Forum  August 26: Guest Speaker: Darcie Keita Registered Dietitian  September 30: Open Forum  October 28th: Guest Speaker: Jazmin Kirby [facility], Health , \"Gratitude Practices\".  November 18: Guest Speaker: Che Yin RD Registered Dietitian, \"Navigating How to Eat around the Holidays\".  December 16: Guest Speaker: Connie Fernandez [facility], \"Changing Your Relationship with Movement\".    ____________________________________________________________________________________________________________________________________________________________________________  [place] [facility] Get Moving Program  Our team of physical therapists is trained to help you understand and take control of your condition. They will perform a thorough evaluation to determine your ability for activity and develop a customized plan to fit your goals and physical ability.  Scheduling: Unsure if the Get Moving program is right for you? Discuss the program with your medical provider or diabetes educator. You can also call us at [phone] to ask questions or schedule an appointment.   [facility] Get Moving " Program  ____________________________________________________________________________________________________________________________________________________________________________  [facility] Diabetes Prevention Program (DPP)  If you have prediabetes and Medicare please contact us via Adelph[employer] Mobilehart to learn more about the Diabetes Prevention Program (DPP)  Program Details:  [facility] offers the year-long Diabetes Prevention Program (DPP). The program helps you to make lifestyle changes that prevent or delay type 2 diabetes by supporting healthy eating, increased physical activity, stress reduction and use of coping skills.   On average, previous [facility] DPP cohorts have lost and maintained at least 5% of their starting weight throughout the program and averaged more than 150 minutes of physical activity per week.  Participants meet weekly for one-hour group sessions over sixteen weeks, every other week for the next 8 weeks, and monthly for the last six months.   A year-long maintenance program is also available for participants who complete the first year.   Location & Cost:   During the COVID-19 Public Health Emergency, the program is offered virtually. When in-person classes can resume, they will be held at [facility].  For people with Medicare, the program is covered in full. A self-pay option will also be available for those with non-Medicare insurance plans.   _________________________________________________________________________________________________________________________________________________________  Bluetooth Scale:    We hope to provide you with high quality virtual healthcare visits while social distancing for COVID-19 is necessary, as well as in the future when virtual visits may be more convenient for you.     Our technology team made it possible for Bluetooth scales to send weight measurements to our electronic medical record. This allows  weights from you weighing at home to securely flow into the medical record, which will improve telephone and virtual visits.   Additionally, studies have shown that adults actually lose more weight when their weights are automatically sent to someone else, and also that this process is not stressful for those adults.    Below is a link for purchasing the scale, with a discount code for our patients. You may call your insurance company to see if they will reimburse you for the cost of the scale, as a piece of durable medical equipment. The scales only go up to a weight of 400 pounds. This is an issue and we are working with the developer on increasing this. We found no scales that go over 400lb that have blue-tooth for connecting to T-ZONE.    Scale to purchase: the [employer]  Body  Scale: [email or web address].ds    Discount Code: We have a discount code for our patients to bring the cost down to $50, Discount code is: [place]_Scale_20%off  _______________________________________________________________________________________________________________________________________________________________________________    To work with a Behavioral Health Psychologist:    Call to schedule:    Puma Degroot - [phone]  Logan Barone - [phone]  Mercedez Castro - [phone]  Antonina Bravo - [phone]   Shayla Johnson PhD (cannot accept Medicare) [phone]        Thank you,   [facility] Comprehensive Weight Management Team

## 2023-02-22 NOTE — NURSING NOTE
"Chief Complaint   Patient presents with     Consult     New consultation for weight management.         Vitals:    02/22/23 1133   Weight: 185 lb   Height: 5' 6\"       Body mass index is 29.86 kg/m .      aMrifer Duffy, EMT  [facility]                      "

## 2023-02-22 NOTE — PATIENT INSTRUCTIONS
"Jackson Pedroe!    Follow-up with RD in 1 month    Thank you,    Darcie Keita, MADAI, LD  If you would like to schedule or reschedule an appointment with the RD, please call [phone]    Nutrition Goals  1) Use a healthy snack between lunch and dinner (see ideas below)  2) 9\" Plate method (1/2 plate non-starchy vegetables/fruit, 1/4 plate lean protein, 1/4 plate whole grain starch - no more than 1/2 cup carb/meal)    -Portion Control Without Measuring   [email or web address]   3) Protein Sources   [email or web address]   4) Mediterranean Diet: https://www.health.[employer].edu/blog/a-practical-guide-to-the-mediterranean-diet-9348213391200  5) VeKick Sportie recipes: https://www.[employer]/topics/vegetables    Snack examples:   - 2 cup popcorn   - 1 cup mixed berries   - 15 almonds, walnuts, cashews   - carrot/celery sticks and 2 tbsp low-fat ranch   - 1 hard boiled egg   - Part-skim mozzarella cheese stick   - Low-fat, low-sugar greek yogurt with 1/2 cup berries   - Med apple or pear   - sliced bell peppers with 1/2 cup salsa   - 1/2 cup roasted chickpeas   - sliced cucumbers with vinegar    100 calorie sweets: Smart Sweets, Dr. Maxwell's Xylitol candy, Fiber One desserts, Fit and Active 100 calorie snack sweets at [facility]; [employer] 100 calorie pre-portioned cookies, sugar-free pudding, sugar-free jello.    Snack Recipes:  - Banana and creamy PB dip (https://www.diabetesfoodhub.org/recipes/sweet-peanut-buttery-dip.html?home-category_id=23)  - Roasted chickpeas (https://www.diabetesfoodhub.org/recipes/roasted-and-spiced-chickpeas.html?home-category_id=23)  - Lemon Raspberry natalie seed pudding (https://www.diabetesfoodhub.org/recipes/lemon-raspberry- natalie-seed-pudding.html?home-category_id=23)  - Black bean hummus with carrot and celery sticks (https://www.diabetesfoodhub.org/recipes/black-bean-hummus.html?home-category_id=23)  - Greek yogurt chocolate mouse " (https://www.diabetesfoodgoBramble.org/recipes/greek-yogurt-chocolate-mousse.html?home-category_id=23)   - Broccoli Cheese Bites  (https://www.diabetesfoPortal Solutions.org/recipes/broccoli-cheese-bites.html?home-category_id=20)  - Chicken Satay with peanut sauce  (https://www.WHOOP.org/recipes/blueberry-almond-chicken-salad-lettuce-wraps.html?home-category_id=20)  - Deviled Eggs  (https://www.WHOOP.org/recipes/devilled-eggs.html?home-category_id=20)      The Plate Method  Http://www.[email or web address]    Carbohydrates  http://[email or web address]     Mindful Eating  http://[email or web address]     Summary of Volumetrics Eating Plan  [email or web address]     Interested in working with a health ? Health coaches work with you to improve your overall health and wellbeing. They look at the whole person, and may involve discussion of different areas of life, including, but not limited to the four pillars of health (sleep, exercise, nutrition, and stress management). Discuss with your care team if you would like to start working a health .    Health Coaching-3 Pack:    $99 for three health coaching visits    Visits may be done in person or via phone    Coaching is a partnership between the  and the client; Coaches do not prescribe or diagnose    Coaching helps inspire the client to reach his/her personal goals    COMPREHENSIVE WEIGHT MANAGEMENT PROGRAM  VIRTUAL SUPPORT GROUPS    For Support Group Information:      We offer support groups for patients who are working on weight loss and considering, preparing for or have had weight loss surgery.   There is no cost for this opportunity.  You are invited to attend the?Virtual Support Groups?provided by any of the following locations:    [facility] via [employer] Teams with Radha Sharp RN  2.   [place] via [employer] Teams with Gonzalo Parra, PhD, LP  3.   [place] via [employer] Teams with Sydni Mccabe RN  4.   [facility]  "via Microsoft Teams with Sydni Talbot [facility]    The following Support Group information can also be found on our website:  https://www.[facility].org/treatments/weight-loss-surgery-support-groups    https://www.[facility].org/treatments/weight-loss-and-weight-loss-surgery-support-groups    [facility] Weight Loss Surgery Support Group    [facility] Weight Loss Surgery Support Group  The support group is a patient-lead forum that meets monthly to share experiences, encouragement and education. It is open to those who have had weight loss surgery, are scheduled for surgery, or are considering surgery.   WHEN: This group meets on the 3rd Wednesday of each month from 5:00PM - 6:00PM virtually using Microsoft Teams.   FACILITATOR: Led by Radha Waldron, MADAI, LD, RN, the program's Clinical Coordinator.   TO REGISTER: Please contact the clinic via [employer] or call the nurse line directly at [phone] to inform our staff that you would like an invite sent to you and to let us know the email you would like the invite sent to. Prior to the meeting, a link with directions on how to join the meeting will be sent to you.    2023 Meetings (speakers to be determined)  January 18: \"Let's Talk\" a time for the group to share.  February 15: \"Let's Talk\" a time for the group to share.  March 15: \"Let's Talk\" a time for the group to share.  April 19: \"Let's Talk\" a time for the group to share.  May 17: \"Let's Talk\" a time for the group to share.  June 21: \"Let's Talk\" a time for the group to share.    [facility] Support Groups    [facility] Bariatric Care Support Group?  This is open to all pre- and post- operative bariatric surgery patients as well as their support system.   WHEN: This group meets the 2nd Tuesday of each month from 6:30 PM - 8:00 PM virtually using Microsoft Teams.   FACILITATOR: Led by Gonzalo Parra, Ph.D who is a Licensed Psychologist " "with the [facility] Comprehensive Weight Management Program.   TO REGISTER: Please send an email to Gonzalo Prara, Ph.D., LP at?[email or web address]?if you would like an invitation to the group and to learn about using Microsoft Teams.    2023 Meetings  January 10: Adriano Florentino, PharmD, Pharmacy Resident, \"Medications and Bariatric Surgery\"  February 14:  March 14:  April 11:  May 9:  June 11:    [facility] Post-Operative Bariatric Surgery Support Group  This is a support group for [facility] bariatric patients (and those external to [facility]) who have had bariatric surgery and are at least 3 months post-surgery.  WHEN: This support group meets the 4th Wednesday of the month from 11:00 AM - 12:00 PM virtually using Microsoft Teams.   FACILITATOR: Led by Certified Bariatric Nurse, Sydni Mccabe RN.   TO REGISTER: Please send an email to Sydni at [email or web address] if you would like an invitation to the group and to learn about using Microsoft Teams.    2023 Meetings  January 25  February 22  March 22  April 26  May 24  Edith 28      [facility] Healthy Lifestyle Virtual Support Group    Healthy Lifestyle Virtual Support Group?  This is 60 minutes of small group guided discussion, support and resources. All are welcome who want a healthy lifestyle.  WHEN: This group meets monthly on a Friday from 12:30 PM - 1:30 PM virtually using Microsoft Teams.   FACILITATOR: Led by National Board Certified Health and , Sydni Talbot [facility] [place].   TO REGISTER: Please send an email to Sydni [email or web address] to receive monthly invites to the group or if you have any questions about having a health .  Prior to the meeting, a link with directions on how to join the meeting will be sent to you.    2023 Meetings January 20: \"Let's Talk\" a time for the group to share  February 17: Guest Speaker, Che Yin RD, Registered Dietician, \"Tips " "to Maximize Your Metabolism\"  March 17: Let's Talk\" a time for the group to share  April 14: Guest Speaker, Darcie Keita RD, Registered Dietician, \"Heart Health\"  May 19: \"Let's Talk\" a time for the group to share  June: To be announced.      "

## 2023-02-22 NOTE — LETTER
"2023       RE: Carol Barkley  [address]  [facility] [address]     Dear Colleague,    Thank you for referring your patient, Carol Barkley, to the [facility] WEIGHT [facility]NAGEMENT [facility]IN[facility]. Please see a copy of my visit note below.    Virtual Visit Check-In    During this virtual visit the patient is located in [place], patient verifies this as the location during the entirety of this visit.     Carol is a 67 year old who is being evaluated via a billable video visit.      What phone number would you like to be contacted at? [phone]  How would you like to obtain your AVS? MyChart    Distant Location (provider location):  Off-site  Phone call duration: 60 minutes        74 minutes spent on the date of the encounter doing chart review, history and exam, documentation and further activities per the note    New Medical Weight Management Consult    PATIENT:  Carol Barkley  MRN:         [id number]  :         1955  NAY:         2023    Dear Emse Goetz PA-C,    I had the pleasure of seeing your patient, Carol Barkley. Full intake/assessment was done to determine barriers to weight loss success and develop a treatment plan. Carol Barkley is a 67 year old female interested in treatment of medical problems associated with excess weight. She has a height of 5' 5.75\", a weight of 185 lbs 0 oz, and the calculated Body mass index is 30.09 kg/m .        Assessment & Plan   Problem List Items Addressed This Visit        Digestive    Class 1 obesity with body mass index (BMI) of 30.0 to 30.9 in adult, unspecified obesity type, unspecified whether serious comorbidity present     Obesity onset in the past 10 years. Has had a gradaul 20lb weight gain over the past 20 years. Previously was weight stable around 150-160lbs. Has struggled to lose weight, and maintain any weight loss throughout the past 20 years. Has been very active her whole " life, especially cross country skiing. Currently this is limited due to low back pain and left foot parathesia. She feels uncomfortable at this weight, and believes it is influencing her back pain and new diagnosis of HTN.     She tries to maintain a healthy diet, and has worked at removing sugar from her diet. She does not struggle with hunger, thoughts of food, or getting/staying full. Some mild anxious eating at times, but is very minimal.     Had a large discussion of weight loss through diet changes +/- a medication. With her age, I am concerned with starting Phentermine. While her kidney function is normal, she has baseline parathesia that I am concerned could worsen with starting Topirmate. Both are contraindicated at this time.     Discussed starting Naltrexone. No contraindications. Not taking any narcotics. No history of liver disease. She does have some minimal cravings. Discussed side effects, risks, and benefits.     Discussed starting a GLP-1. No contraindications. Discussed risks, benefits, and side effects. Will monitor for any changes in constipation if started. Put in a prescription for wegovy to see if it would be covered by insurance.     Patient will reach out if she would like to start a medication. [facility] sent on more information about both GLP-1 and Naltrexone.              Relevant Medications    Semaglutide-Weight Management (WEGOVY) 0.25 MG/0.5ML SOAJ    Semaglutide-Weight Management (WEGOVY) 0.5 MG/0.5ML SOAJ (Start on 3/22/2023)      1. Discussed possibly starting a medication today - naltrexone or wegovy. She will reach out if she would like to start one. Will see prescribed Wegovy to see if it is covered by insurance at this time.   2. Follow up with Barbara Hall in 6 weeks.   3. Follow up with dietitian as needed.             Carol Barkley is a 67 year old female who presents to clinic today for the following health issues.     She has the following co-morbidities:       2/21/2023   I  "have the following health issues associated with obesity: High Blood Pressure, High Cholesterol, GERD (Reflux)   I have the following symptoms associated with obesity: Lower Extremity Swelling, Back Pain       Migraine - since she was 2 yo. Well controlled with abortive medications. Has never taken a preventative medications. Followed by PCP     HTN - just started medications in December. Does not check BP at home. Followed by PCP     HLD - just started medication in December. Followed by cardiology.     Chronic low back with radiation of numbness to toes - limits mobility. History laminectomy     Dyspnea on exertion - gradually has worsened over time. May have been exacerbated by COVID in September     Constipation - well controlled with coffee and stool softner.     Raynauds - fingers sting, especially int the cold.     Patient Goals 2/21/2023   I am interested in having a healthier weight to diminish current health problems: Yes   I am interested in having a healthier weight in order to prevent future health problems: Yes   I am interested in having a healthier weight in order to have a future surgery: No       Referring Provider 2/21/2023   Please name the provider who referred you to Medical Weight Management.  If you do not know, please answer: \"I Don't Know\". Esme Goetz       Weight History 2/21/2023   How concerned are you about your weight? Very Concerned   Would you describe your weight gain as gradual? Yes   I became overweight: As an Adult   The following factors have contributed to my weight gain:  Started on Medication that Caused Weight Gain, Eating Too Much, Lack of Exercise   I have tried the following methods to lose weight: Watching Portions or Calories, Exercise, Atkins-type Diet (Low Carb/High Protein)   My lowest weight since age 17 was: 150   My highest weight since age 17 was: 186   The most weight I have ever lost was: (lbs) 10   I have the following family history of obesity/being " "overweight:  My mother is overweight, One or more of my siblings are overweight, Many of my relatives are overweight   Has anyone in your family had weight loss surgery? No   How has your weight changed over the last year?  Gained       [place], so was very active. Weight has been stable most of her life. Weight increased through 3 pregnancies. After menopause has not been able to lose any weight. Is having low back pain, with numbness in left toes, that is limiting mobility and activity. Weight gain has been gradual. Is usually a big walker and cross country skier. Has always been hard to lose weight through most of her life, even with strict diet and exercise. Wants to lose weight now in order to help with low back pain and new diagnosis of HTN.     Weight today - 185lbs, highest weight in life.     Eating 3 meals a day, with snacks. Will snack due to headaches or energy boost. Tries to snack on fruit. Tries to not snack on sweets. Has always watched what she eats. Does not drink pop or juice, tries to stay away from \"junk food\". Feels no compulsion to finish a bag of chips. Will eat out of anxiety at times, but very rarely. No increased thoughts of food. Craves chocolate. Can get full and stay full. Has cut back on sugary coffees drinks.     Activity - limited due to chronic back pain and numbness in left foot    Has not tried AOMs in the past.     Diet Recall Review with Patient 2/21/2023   Do you typically eat breakfast? Yes   If you do eat breakfast, what types of food do you eat? eggs or oatmeal and coffee with milk a piece of fruit   Do you typically eat lunch? Yes   If you do eat lunch, what types of food do you typically eat?  soup, or salad or sandwich or leftovers   Do you typically eat supper? Yes   If you do eat supper, what types of food do you typically eat? hotdish or soup, or protein and vegetable   Do you typically eat snacks? Yes   If you do snack, what types of food do you typically " eat? vegies, a cookie, some nuts   Do you like vegetables?  Yes   Do you drink water? Yes   How many glasses of juice do you drink in a typical day? 0   How many of glasses of milk do you drink in a typical day? 1   If you do drink milk, what type? Skim   How many 8oz glasses of sugar containing drinks such as Mason-Aid/sweet tea do you drink in a day? 0   How many cans/bottles of sugar pop/soda/tea/sports drinks do you drink in a day? 0   How many cans/bottles of diet pop/soda/tea or sports drink do you drink in a day? 0   How often do you have a drink of alcohol? Monthly or Less       Eating Habits 2/21/2023   Generally, my meals include foods like these: bread, pasta, rice, potatoes, corn, crackers, sweet dessert, pop, or juice. Almost Everyday   Generally, my meals include foods like these: fried meats, brats, burgers, french fries, pizza, cheese, chips, or ice cream. Less Than Weekly   Eat fast food (like tamycas, [employer], Taco Bell). Never   Eat at a buffet or sit-down restaurant. Never   Eat most of my meals in front of the TV or computer. A Few Times a Week   Often skip meals, eat at random times, have no regular eating times. Once a Week   Rarely sit down for a meal but snack or graze throughout.  Never   Eat extra snacks between meals. Almost Everyday   Eat most of my food at the end of the day. A Few Times a Week   Eat in the middle of the night or wake up at night to eat. Never   Eat extra snacks to prevent or correct low blood sugar. A Few Times a Week   Eat to prevent acid reflux or stomach pain. Once a Week   Worry about not having enough food to eat. Never   Have you been to the food shelf at least a few times this year? No   I eat when I am depressed. Once a Week   I eat when I am stressed. Once a Week   I eat when I am bored. Once a Week   I eat when I am anxious. Never   I eat when I am happy or as a reward. Less Than Weekly   I feel hungry all the time even if I just have eaten. Less Than  Weekly   Feeling full is important to me. Once a Week   I finish all the food on my plate even if I am already full. A Few Times a Week   I can't resist eating delicious food or walk past the good food/smell. Once a Week   I eat/snack without noticing that I am eating. A Few Times a Week   I eat when I am preparing the meal. Less Than Weekly   I eat more than usual when I see others eating. Once a Week   I have trouble not eating sweets, ice cream, cookies, or chips if they are around the house. A Few Times a Week   I think about food all day. Never   What foods, if any, do you crave? Sweets/Candy/Chocolate       Amount of Food 2/21/2023   I make myself vomit what I have eaten or use laxatives to get rid of food. Never   I eat a large amount of food, like a loaf of bread, a box of cookies, a pint/quart of ice cream, all at once. Never   I eat a large amount of food even when I am not hungry. Weekly   I eat rapidly. Monthly   I eat alone because I feel embarrassed and do not want others to see how much I have eaten. Never   I eat until I am uncomfortably full. Never   I feel bad, disgusted, or guilty after I overeat. Monthly   I make myself vomit what I have eaten or use laxatives to get rid of food. Never       Activity/Exercise History 2/21/2023   How much of a typical 12 hour day do you spend lying down? Less Than Half the Day   How much of a typical day do you spend walking/standing? Less Than Half the Day   How many hours (not including work) do you spend on the TV/Video Games/Computer/Tablet/Phone? 2-3 Hours   How many times a week are you active for the purpose of exercise? 4-5 TImes a Week   What keeps you from being more active? Pain   How many total minutes do you spend doing some activity for the purpose of exercising when you exercise? 15-30 Minutes       PAST MEDICAL HISTORY:  Past Medical History:   Diagnosis Date     Allergic rhinitis due to other allergen     Immunotherapy     Anxiety state 4/17/2006     Problem list name updated by automated process. Provider to review     Arthritis 2015     Chalazion of right upper eyelid 03/24/2017     CKD (chronic kidney disease) stage 2, GFR 60-89 ml/min      Cough     seen by Dr Freire and ENT, CXR NL     Cystourethrocele      Disturbance of skin sensation 04/17/2006    discussed possible nerve compression with L arm numbness & L upper back pain     Extensor tendon rupture of hand, right, initial encounter 07/28/2022    Added automatically from request for surgery [id number]     Female stress incontinence 11/9/2017     GERD (gastroesophageal reflux disease) 01/09/2012     Hearing loss      Hypertension 2021     Lichen sclerosus et atrophicus of the vulva     Jama     Migraine without aura, without mention of intractable migraine without mention of status migrainosus      Migraine without status migrainosus, not intractable, unspecified migraine type 12/04/2015     PAC (premature atrial contraction) 12/2009    Holter     Pain in joint, lower leg 12/19/2011     Palpitations 05/17/2006     Personal history of colonic polyps      Pure hypercholesterolemia      PVD (posterior vitreous detachment)      Rectocele      Right wrist pain 05/01/2022     Unspecified sinusitis (chronic)      Urine, incontinence, stress female        Work/Social History Reviewed With Patient 2/21/2023   My employment status is: Retired   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 Less Than 50%   How many hours do you spend commuting to work daily?  ? I am free valeriy currently,so unpredictable.   What is your marital status? /In a Relationship   If in a relationship, is your significant other overweight? No   Do you have children? Yes   If you have children, are they overweight? No   Who do you live with?     Are they supportive of your health goals? Yes   Who does the food shopping?  Mostly myself, many times with .      Currently yeny works, chooses what she wants. Maybe works 1-2xweek. Has worked with [employer] most of her career. Has 3 children, and 3 grandchild. Very supportive  and family.     ETOH - rarely   No nicotoine, drugs, THC     Mental Health History Reviewed With Patient 2/21/2023   Have you ever been physically or sexually abused? No   If yes, do you feel that the abuse is affecting your weight? N/A   If yes, would you like to talk to a counselor about the abuse? N/A   How often in the past 2 weeks have you felt little interest or pleasure in doing things? Not at all   Over the past 2 weeks how often have you felt down, depressed, or hopeless? Not at all       Sleep History Reviewed With Patient 2/21/2023   How many hours do you sleep at night? 8   Do you think that you snore loudly or has anybody ever heard you snore loudly (louder than talking or so loud it can be heard behind a shut door)? No   Has anyone seen or heard you stop breathing during your sleep? No   Do you often feel tired, fatigued, or sleepy during the day? No   Do you have a TV/Computer in your bedroom? No       MEDICATIONS:   Current Outpatient Medications   Medication Sig Dispense Refill     clobetasol (TEMOVATE) 0.05 % ointment        fluticasone (FLONASE) 50 MCG/ACT nasal spray Spray 1 spray into both nostrils daily       ketoconazole (NIZORAL) 2 % external shampoo Apply topically daily as needed for itching or irritation 120 mL 6     losartan (COZAAR) 100 MG tablet Take 1 tablet (100 mg) by mouth daily 90 tablet 11     rosuvastatin (CRESTOR) 20 MG tablet Take 1 tablet (20 mg) by mouth daily 90 tablet 11     Semaglutide-Weight Management (WEGOVY) 0.25 MG/0.5ML SOAJ Inject 0.25 mg Subcutaneous once a week For 4 weeks 2 mL 0     [START ON 3/22/2023] Semaglutide-Weight Management (WEGOVY) 0.5 MG/0.5ML SOAJ Inject 0.5 mg Subcutaneous once a week After completed 4 week 0.25mg 2 mL 1     SUMAtriptan (IMITREX) 25 MG tablet Take 1  "tablet (25 mg) by mouth at onset of headache for migraine 9 tablet 0     tretinoin (RETIN-A) 0.025 % cream        fluocinolone (SYNALAR) 0.01 % external solution  (Patient not taking: Reported on 12/14/2022)         ALLERGIES:   Allergies   Allergen Reactions     Dust Mite Extract      Grass      Mold              Objective     Ht 1.67 m (5' 5.75\")   Wt 83.9 kg (185 lb)   LMP 11/30/2004   BMI 30.09 kg/m           Vitals:  No vitals were obtained today due to virtual visit.    Physical Exam   GENERAL: Healthy, alert and no distress  EYES: Eyes grossly normal to inspection.  No discharge or erythema, or obvious scleral/conjunctival abnormalities.  RESP: No audible wheeze, cough, or visible cyanosis.  No visible retractions or increased work of breathing.    SKIN: Visible skin clear. No significant rash, abnormal pigmentation or lesions.  NEURO: Cranial nerves grossly intact.  Mentation and speech appropriate for age.  PSYCH: Mentation appears normal, affect normal/bright, judgement and insight intact, normal speech and appearance well-groomed.     Anti-obesity medication ROS:    HEENT  Hx of glaucoma: No    Cardiovascular  CAD:No  HTN:Yes    Gastrointestinal  GERD:Yes, wont eat before bed.   Constipation:Yes  Liver Dz:No  H/O Pancreatitis:No    Psychiatric  Bipolar: No  Anxiety:No  Depression:No  History of alcohol/drug abuse: No  Hx of eating disorder:No    Endocrine  Personal or family hx of MTC or MEN2:No  Diabetes/prediabetes: No    Neurologic:  Hx of seizures: No  Hx of migraines: Yes  Memory Impairment: No      History of kidney stones: No  Kidney disease: No  Current birth control: post-menopausal       Sincerely,    MARLENY REY PA-C        "

## 2023-02-24 ENCOUNTER — TELEPHONE (OUTPATIENT)
Dept: ENDOCRINOLOGY | Facility: CLINIC | Age: 68
End: 2023-02-24
Payer: COMMERCIAL

## 2023-02-24 NOTE — TELEPHONE ENCOUNTER
"ARLENE and shiv sent    Schedule:  \"Video visit return\" or \"Return weight management\" in person visit - for sooner availability- with Barbara Deleon for 6 wk follow up (around 4/5/23)  "

## 2023-02-26 NOTE — PROGRESS NOTES
AUDIOLOGY REPORT    SUBJECTIVE:  Carol Barkley is a 67 year old female who was seen on 3/9/2023 in the Audiology Clinic at the [facility] for audiologic evaluation, referred by Samantha Sánchez M.D. The patient reports difficulty hearing in groups.  She has constant bilateral tinnitus, which she can ignore.  The patient denies ear pain, drainage from the ears, aural fullness, dizziness, or history of ear surgery.  Patient had Shingles in November 2022, affecting the left side of her face but symptoms resolved.  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  She now uses hearing protection.      OBJECTIVE:  Abuse Screening:  Do you feel unsafe at home or work/school? No  Do you feel threatened by someone? No  Does anyone try to keep you from having contact with others, or doing things outside of your home? No  Physical signs of abuse present? No     Fall Risk Screen:  1. Have you fallen two or more times in the past year? No  2. Have you fallen and had an injury in the past year? No    Timed Up and Go Score (in seconds): not tested  Is patient a fall risk? No  Referral initiated: No  Fall Risk Assessment Completed by Audiology    Otoscopic exam indicates a small amount of non-occluding cerumen bilaterally.     Pure Tone Thresholds assessed using conventional audiometry with fair to good (some false positive responses) reliability from 250-8000 Hz bilaterally using insert earphones and circumaural headphones.     RIGHT: Normal hearing through 500 Hz, sloping to a mild to moderately severe sensorineural hearing loss at 1000 Hz and above.     LEFT:  Normal/borderline normal hearing through 500 Hz, sloping to a mild to moderately severe sensorineural hearing loss at 1000 Hz and above.      Tympanogram:    RIGHT: normal eardrum mobility    LEFT:   normal eardrum mobility    Reflexes (reported by  stimulus ear):  RIGHT: Ipsilateral is present at normal levels  RIGHT: Contralateral is present at normal levels  LEFT:   Ipsilateral is present at normal levels  LEFT:   Contralateral is present at normal levels      Speech Reception Threshold:    RIGHT: 25 dB HL    LEFT:   25 dB HL  Word Recognition Score:     RIGHT: 96% at 70 dB HL using NU-6 recorded word list.    LEFT:   96% at 70 dB HL using NU-6 recorded word list.      ASSESSMENT:   Sensorineural hearing loss bilaterally    Today s results were discussed with the patient in detail.     PLAN:    1. Hearing aid trial could be considered, if patient interest/motivation.  2. Recheck hearing in 1-2 years or sooner if changes are noted.   3. Discussed option of custom musician's earplugs.  If the patient becomes interested in this, she will schedule an earmold impression appointment by calling [phone].   4. Follow up with Dr. Sánchez.      The patient expressed understanding and agreement with this plan.    Claudia Mcclellan, CCC-A, [facility] [place]  Licensed Audiologist  [place] #[id number]    Enclosure: audiogram    Cc Samantha Sánchez M.D.   normal

## 2023-02-27 NOTE — TELEPHONE ENCOUNTER
PA Initiation    Medication: Semaglutide-Weight Management (WEGOVY) 0.25 MG/0.5ML SOAJ   Insurance Company: [employer] - Phone [phone] Fax [phone]  Pharmacy Filling the Rx: [id number] [place], [place] - [address] [place] [address] W  Filling Pharmacy Phone: [phone]  Filling Pharmacy Fax: [phone]  Start Date: 2/26/2023

## 2023-03-01 NOTE — TELEPHONE ENCOUNTER
Contacted insurance plan to follow up on request.  Per rep no case was received.  Resent in via right fax.

## 2023-03-01 NOTE — TELEPHONE ENCOUNTER
PRIOR AUTHORIZATION DENIED    Medication: Semaglutide-Weight Management (WEGOVY) 0.25 MG/0.5ML SOAJ--DENIED    Denial Date: 3/1/2023    Denial Rational: Weight loss medications are excluded    Appeal Information:

## 2023-03-02 NOTE — TELEPHONE ENCOUNTER
Wegovy denied, patients insurance doesn't cover and weight loss medications. Please advise on how you would like to proceed. If appealed please provide medical rational.

## 2023-03-08 NOTE — TELEPHONE ENCOUNTER
Just following up on next steps for wegovy denial. Are we going to appeal, change medication, or just cancel the medication?

## 2023-03-09 ENCOUNTER — OFFICE VISIT (OUTPATIENT)
Dept: AUDIOLOGY | Facility: CLINIC | Age: 68
End: 2023-03-09
Payer: COMMERCIAL

## 2023-03-09 DIAGNOSIS — H90.3 SENSORINEURAL HEARING LOSS (SNHL) OF BOTH EARS: Primary | ICD-10-CM

## 2023-03-09 PROCEDURE — 92550 TYMPANOMETRY & REFLEX THRESH: CPT | Performed by: AUDIOLOGIST-HEARING AID FITTER

## 2023-03-09 PROCEDURE — 92557 COMPREHENSIVE HEARING TEST: CPT | Performed by: AUDIOLOGIST-HEARING AID FITTER

## 2023-08-12 ENCOUNTER — HOSPITAL ENCOUNTER (OUTPATIENT)
Dept: GENERAL RADIOLOGY | Facility: HOSPITAL | Age: 68
Discharge: HOME | End: 2023-08-12
Attending: FAMILY MEDICINE
Payer: COMMERCIAL

## 2023-08-12 ENCOUNTER — OFFICE VISIT (OUTPATIENT)
Dept: FAMILY MEDICINE | Facility: CLINIC | Age: 68
End: 2023-08-12
Payer: COMMERCIAL

## 2023-08-12 VITALS
DIASTOLIC BLOOD PRESSURE: 78 MMHG | TEMPERATURE: 98.8 F | OXYGEN SATURATION: 99 % | BODY MASS INDEX: 29.92 KG/M2 | SYSTOLIC BLOOD PRESSURE: 114 MMHG | WEIGHT: 184 LBS | HEART RATE: 75 BPM

## 2023-08-12 DIAGNOSIS — S89.92XA INJURY OF LOWER EXTREMITY, LEFT, INITIAL ENCOUNTER: ICD-10-CM

## 2023-08-12 DIAGNOSIS — S89.92XA KNEE INJURY, LEFT, INITIAL ENCOUNTER: Primary | ICD-10-CM

## 2023-08-12 DIAGNOSIS — S89.92XA KNEE INJURY, LEFT, INITIAL ENCOUNTER: ICD-10-CM

## 2023-08-12 PROCEDURE — 99214 OFFICE O/P EST MOD 30 MIN: CPT | Performed by: FAMILY MEDICINE

## 2023-08-12 PROCEDURE — 73590 X-RAY EXAM OF LOWER LEG: CPT | Mod: LT

## 2023-08-12 PROCEDURE — 73562 X-RAY EXAM OF KNEE 3: CPT | Mod: LT

## 2023-08-12 NOTE — PATIENT INSTRUCTIONS
The ortho  service should call you the next 1 to 2 days if they have not called you- call them at the number noted       Elevate leg and knee-ice/heat to area which ever works better for swelling/pain    For pain    Start  Ibuprofen (motrin/advil)  600 mg 3 times a day always take with food for the next 2to 3 days until pain resolves-maximum dose of ibuprofen is 2400 mg in 24 hours     Can alternate with     Acetaminophen (Tylenol ) 1000 mg 3 times a day for the next 2 to 3 days until pain resolves-maximum dose of acetaminophen (tylenol)  is 3000 mg in 24-hours

## 2023-08-12 NOTE — PROGRESS NOTES
ASSESSMENT/PLAN:      ICD-10-CM    1. Knee injury, left, initial encounter  S89.92XA XR Knee Left 3 Views     Orthopedic  Referral      2. Injury of lower extremity, left, initial encounter  S89.92XA XR Tibia and Fibula Left 2 Views     Orthopedic  Referral          Patient Instructions   The ortho  service should call you the next 1 to 2 days if they have not called you- call them at the number noted       Elevate leg and knee-ice/heat to area which ever works better for swelling/pain    For pain    Start  Ibuprofen (motrin/advil)  600 mg 3 times a day always take with food for the next 2to 3 days until pain resolves-maximum dose of ibuprofen is 2400 mg in 24 hours     Can alternate with     Acetaminophen (Tylenol ) 1000 mg 3 times a day for the next 2 to 3 days until pain resolves-maximum dose of acetaminophen (tylenol)  is 3000 mg in 24-hours     Reviewed medication instructions and side effects. Follow up if experiences side effects.     I reviewed supportive care, otc meds to use if needed, expected course, and signs of concern.  Follow up as needed or if she does not improve within  1-2 days or if worsens in any way.  Reviewed red flag symptoms and is to go to the ER if experiences any of these.     The use of Dragon/[employer] dictation services may have been used to construct the content in this note; any grammatical or spelling errors are non-intentional. Please contact the author of this note directly if you are in need of any clarification.      On the day of the encounter, time spend on chart review, patient visit, review of testing, documentation was 30 minutes              Patient presents with:  Musculoskeletal Problem: Fell down a flight of stairs left lower leg injury bruising noted. Still noting pain when pressure applied or with walking.        Subjective     Carol Barkley is a 68 year old female who presents to clinic today for the following health issues:    HPI        Musculoskeletal problem/pain    Duration: 4 days ago  Description  Location: Left knee and left lower leg  Accompanying signs and symptoms: swelling of the left knee and lower leg, ecchymosis  History  Patient tripped and fell down a flight of hardwood stairs approximately 7 stairs landed on the landing on top some cardboard boxes that were sitting on the landing.  She  did not hit her head , no loss of  consciousness, no pain or injury of other extremities, no hip pain ,no back or neck pain,  Patient does not recall twisting the knee/leg, falling directly on the leg/ knee or position of the knee/leg when she landed after the fall  Previous similar problem: no   Previous evaluation:  none  Precipitating or alleviating factors:  Trauma or overuse: YES-trauma as noted  Aggravating factors include: no pain with sitting, mild pain if she sits for long periods of time, only has discomfort in her knee and leg with ambulation only mild pain with standing- shifts weight to her right  leg to prevent pain in the left knee/leg when  standing  Therapies tried and outcome: rest, ice, acetaminophen, Ibuprofen, and elevate      Past Medical History:   Diagnosis Date    Allergic rhinitis due to other allergen     Immunotherapy    Anxiety state 4/17/2006    Problem list name updated by automated process. Provider to review    Arthritis 2015    Chalazion of right upper eyelid 03/24/2017    CKD (chronic kidney disease) stage 2, GFR 60-89 ml/min     Cough     seen by Dr Freire and ENT, CXR NL    Cystourethrocele     Disturbance of skin sensation 04/17/2006    discussed possible nerve compression with L arm numbness & L upper back pain    Extensor tendon rupture of hand, right, initial encounter 07/28/2022    Added automatically from request for surgery [id number]    Female stress incontinence 11/9/2017    GERD (gastroesophageal reflux disease) 01/09/2012    Hearing loss     Hypertension 2021    Lichen sclerosus et atrophicus of the  vulva     Jama    Migraine without aura, without mention of intractable migraine without mention of status migrainosus     Migraine without status migrainosus, not intractable, unspecified migraine type 12/04/2015    PAC (premature atrial contraction) 12/2009    Holter    Pain in joint, lower leg 12/19/2011    Palpitations 05/17/2006    Personal history of colonic polyps     Pure hypercholesterolemia     PVD (posterior vitreous detachment)     Rectocele     Right wrist pain 05/01/2022    Unspecified sinusitis (chronic)     Urine, incontinence, stress female      Social History     Tobacco Use    Smoking status: Never    Smokeless tobacco: Never   Substance Use Topics    Alcohol use: No     Comment: 1 drinks per week       Current Outpatient Medications   Medication Sig Dispense Refill    clobetasol (TEMOVATE) 0.05 % ointment       fluocinolone (SYNALAR) 0.01 % external solution  (Patient not taking: Reported on 8/15/2023)      fluticasone (FLONASE) 50 MCG/ACT nasal spray Spray 1 spray into both nostrils daily      ketoconazole (NIZORAL) 2 % external shampoo Apply topically daily as needed for itching or irritation 120 mL 6    losartan (COZAAR) 100 MG tablet Take 1 tablet (100 mg) by mouth daily 90 tablet 11    rosuvastatin (CRESTOR) 20 MG tablet Take 1 tablet (20 mg) by mouth daily 90 tablet 11    SUMAtriptan (IMITREX) 25 MG tablet Take 1 tablet (25 mg) by mouth at onset of headache for migraine 9 tablet 0    tretinoin (RETIN-A) 0.025 % cream       Semaglutide-Weight Management (WEGOVY) 0.25 MG/0.5ML pen Inject 0.25 mg Subcutaneous once a week For 4 weeks 2 mL 0    Semaglutide-Weight Management (WEGOVY) 0.5 MG/0.5ML pen Inject 0.5 mg Subcutaneous once a week After completed 4 week 0.25mg 2 mL 1     Allergies   Allergen Reactions    Dust Mite Extract     Grass     Mold              ROS are negative, except as otherwise noted HPI      Objective    /78   Pulse 75   Temp 98.8  F (37.1  C) (Oral)   Wt 83.5 kg  (184 lb)   [facility] 11/30/2004   SpO2 99%   BMI 29.92 kg/m    Body mass index is 29.92 kg/m .  Physical Exam   GENERAL: healthy, alert and no distress  EYES: Eyes grossly normal to inspection, PERRL and conjunctivae and sclerae normal  MS: left knee-mild swelling, no effusion ,tender across the joint space more prominent over the lateral joint space, pain laterally with varus valgus stressors, ligaments appear intact limited exam due to pain,   Left lower leg-tender to palpation over the proximal fibula,mild swelling and ecchymosis of soft tissue of the lateral and anterior leg, no posterior calf tenderness, full range of motion left ankle without pain, no swelling of left ankle orl eft  foot  NEURO: Normal strength and tone, mentation intact and speech normal, gait altered due to left knee and leg pain      Diagnostic Test Results:  Labs reviewed in Epic  Results for orders placed or performed during the hospital encounter of 08/12/23   XR Tibia and Fibula Left 2 Views     Status: None    Narrative    EXAM: XR TIBIA AND FIBULA LEFT 2 VIEWS, XR KNEE LEFT 3 VIEWS  LOCATION: [facility]  DATE: 8/12/2023    INDICATION: Pain after injury  COMPARISON: None.      Impression    IMPRESSION: The left knee is negative for fracture or significant compartmental narrowing. No significant effusion. The left tibia and fibula are also negative. Minimal Achilles calcaneal spurring.   Results for orders placed or performed during the hospital encounter of 08/12/23   XR Knee Left 3 Views     Status: None    Narrative    EXAM: XR TIBIA AND FIBULA LEFT 2 VIEWS, XR KNEE LEFT 3 VIEWS  LOCATION: [facility]  DATE: 8/12/2023    INDICATION: Pain after injury  COMPARISON: None.      Impression    IMPRESSION: The left knee is negative for fracture or significant compartmental narrowing. No significant effusion. The left tibia and fibula are also negative. Minimal Achilles calcaneal spurring.

## 2023-08-15 ENCOUNTER — OFFICE VISIT (OUTPATIENT)
Dept: ORTHOPEDICS | Facility: CLINIC | Age: 68
End: 2023-08-15
Attending: FAMILY MEDICINE
Payer: COMMERCIAL

## 2023-08-15 VITALS
BODY MASS INDEX: 29.92 KG/M2 | DIASTOLIC BLOOD PRESSURE: 75 MMHG | WEIGHT: 184 LBS | HEART RATE: 67 BPM | SYSTOLIC BLOOD PRESSURE: 125 MMHG

## 2023-08-15 DIAGNOSIS — S80.12XA CONTUSION OF LEFT LOWER EXTREMITY, INITIAL ENCOUNTER: ICD-10-CM

## 2023-08-15 PROCEDURE — 99203 OFFICE O/P NEW LOW 30 MIN: CPT | Performed by: PEDIATRICS

## 2023-08-15 NOTE — PROGRESS NOTES
ASSESSMENT & PLAN    Carol was seen today for injury and pain.    Diagnoses and all orders for this visit:    Contusion of left lower extremity, initial encounter  -     Orthopedic  Referral            See Patient Instructions  Patient Instructions   Lower extremity injury consistent with contusion.  We reviewed the x-rays, which are reassuring.  Overall function fairly good as well in the lower leg despite some persistent tenderness, swelling, bruising.  Anticipate improvement with time.  Simple motion exercises reviewed today.  May use compression if desired.  Otherwise, icing/cool packs, over-the-counter medication, elevation, rest if needed.  Okay for returning to activities when comfortable doing so.  Follow-up here is open-ended.    If you have any further questions for your physician or physician s care team you can contact them thru [employer] or by calling [phone].      Luis Miguel Miller [facility] [place]      CC: Veronica Munoz      -----  Chief Complaint   Patient presents with    Left Knee - Injury    Left Lower Leg - Pain       SUBJECTIVE  Carol Barkley is a/an 68 year old female who is seen in consultation at the request of  Veronica Munoz M.D. for evaluation of left knee injury.     The patient is seen by themselves.    Onset: 6 day(s) ago. Patient describes injury as she tripped on the stairs and fell. States she fell directly on the lower shin. She says that her shin hurts more than the knee.   Location of Pain: Anterior, medial shin   Worsened by: pressing on it   Better with: leaving it alone   Treatments tried: rest/activity avoidance, ice, and ibuprofen  Associated symptoms: swelling and bruising     Orthopedic/Surgical history: NO  Social History/Occupation: organist     **  Above information per rooming staff.  Additional history:  Injury 8/9.  Fell head first down stairs. Did not hit head.   Has persistent tenderness in anterolateral lower  leg on left.  Watches grandkids at times during the week, and had some pain and difficulty with getting up off floor.  Still able to walk some, but has pain, so has limited walking for exercise.        REVIEW OF SYSTEMS:  Review of Systems    OBJECTIVE:  /75   Pulse 67   Wt 83.5 kg (184 lb)   LMP 11/30/2004   BMI 29.92 kg/m     General: healthy, alert and in no distress  Skin: no suspicious lesions or rash.  CV: distal perfusion intact   Resp: normal respiratory effort without conversational dyspnea   Psych: normal mood and affect  Gait: ambulates independently  Neuro: Normal light sensory exam of extremity       Left Knee exam    Inspection:   no effusion   Fading anterior knee ecchymosis    ROM:      Full active and passive ROM with flexion and extension  No pain    Patellar Motion:      Normal patellar tracking noted through range of motion    Tender: none at knee    Special Tests:      neg (-) anterior drawer       neg (-) posterior drawer       neg (-) varus       neg (-) valgus            Left Leg/Ankle Exam:    Inspection:       ecchymosis noted fading lower leg, ankle       Normal DP artery pulse       Normal PT artery pulse       Mild diffuse LE swelling compared to right    ROM:        full ROM with dorsiflexion, plantarflexion, inversion and eversion  Some anterolateral lower leg discomfort with end of PF, also active DF, mild with inversion, eversion    Strength:  Able to resist all above motions    Tender:  Anterolateral lower leg, area of ecchymosis  Compartments supple        RADIOLOGY:  Final results and radiologist's interpretation, available in the [facility] health record.  Images were reviewed with the patient in the office today.  My personal interpretation of the performed imaging: no fractures noted.        EXAM: XR TIBIA AND FIBULA LEFT 2 VIEWS, XR KNEE LEFT 3 VIEWS  LOCATION: [facility]  DATE: 8/12/2023     INDICATION: Pain after injury  COMPARISON: None.                                                                       IMPRESSION: The left knee is negative for fracture or significant compartmental narrowing. No significant effusion. The left tibia and fibula are also negative. Minimal Achilles calcaneal spurring.            Review of prior external note(s) from - referring  Review of the result(s) of each unique test - imaging  Independent interpretation of a test performed by another physician/other qualified health care professional (not separately reported) - imaging

## 2023-08-15 NOTE — PROGRESS NOTES
Exercises provided today:  3-5 x a wk,  2 x a day    Ankle ABC's  Ankle/lower leg mobility, standing achilles stretch  Towel Scrunches, x 4

## 2023-08-15 NOTE — LETTER
8/15/2023         RE: Carol Barkley  [address]  [place] [address]        Dear Colleague,    Thank you for referring your patient, Carol Barkley, to the [facility] [place]. Please see a copy of my visit note below.    ASSESSMENT & PLAN    Carol was seen today for injury and pain.    Diagnoses and all orders for this visit:    Contusion of left lower extremity, initial encounter  -     Orthopedic  Referral            See Patient Instructions  Patient Instructions   Lower extremity injury consistent with contusion.  We reviewed the x-rays, which are reassuring.  Overall function fairly good as well in the lower leg despite some persistent tenderness, swelling, bruising.  Anticipate improvement with time.  Simple motion exercises reviewed today.  May use compression if desired.  Otherwise, icing/cool packs, over-the-counter medication, elevation, rest if needed.  Okay for returning to activities when comfortable doing so.  Follow-up here is open-ended.    If you have any further questions for your physician or physician s care team you can contact them thru MyChart or by calling [phone].      Luis Miguel Miller DO  [facility]      CC: Veronica Munoz      -----  Chief Complaint   Patient presents with     Left Knee - Injury     Left Lower Leg - Pain       SUBJECTIVE  Carol Barkley is a/an 68 year old female who is seen in consultation at the request of  Veronica Munoz M.D. for evaluation of left knee injury.     The patient is seen by themselves.    Onset: 6 day(s) ago. Patient describes injury as she tripped on the stairs and fell. States she fell directly on the lower shin. She says that her shin hurts more than the knee.   Location of Pain: Anterior, medial shin   Worsened by: pressing on it   Better with: leaving it alone   Treatments tried: rest/activity avoidance, ice, and ibuprofen  Associated symptoms: swelling  and bruising     Orthopedic/Surgical history: NO  Social History/Occupation: organist     **  Above information per rooming staff.  Additional history:  Injury 8/9.  Fell head first down stairs. Did not hit head.   Has persistent tenderness in anterolateral lower leg on left.  Watches grandkids at times during the week, and had some pain and difficulty with getting up off floor.  Still able to walk some, but has pain, so has limited walking for exercise.        REVIEW OF SYSTEMS:  Review of Systems    OBJECTIVE:  /75   Pulse 67   Wt 83.5 kg (184 lb)   LMP 11/30/2004   BMI 29.92 kg/m     General: healthy, alert and in no distress  Skin: no suspicious lesions or rash.  CV: distal perfusion intact   Resp: normal respiratory effort without conversational dyspnea   Psych: normal mood and affect  Gait: ambulates independently  Neuro: Normal light sensory exam of extremity       Left Knee exam    Inspection:   no effusion   Fading anterior knee ecchymosis    ROM:      Full active and passive ROM with flexion and extension  No pain    Patellar Motion:      Normal patellar tracking noted through range of motion    Tender: none at knee    Special Tests:      neg (-) anterior drawer       neg (-) posterior drawer       neg (-) varus       neg (-) valgus            Left Leg/Ankle Exam:    Inspection:       ecchymosis noted fading lower leg, ankle       Normal DP artery pulse       Normal PT artery pulse       Mild diffuse LE swelling compared to right    ROM:        full ROM with dorsiflexion, plantarflexion, inversion and eversion  Some anterolateral lower leg discomfort with end of PF, also active DF, mild with inversion, eversion    Strength:  Able to resist all above motions    Tender:  Anterolateral lower leg, area of ecchymosis  Compartments supple        RADIOLOGY:  Final results and radiologist's interpretation, available in the [facility] health record.  Images were reviewed with the patient in the office  today.  My personal interpretation of the performed imaging: no fractures noted.        EXAM: XR TIBIA AND FIBULA LEFT 2 VIEWS, XR KNEE LEFT 3 VIEWS  LOCATION: [facility]  DATE: 8/12/2023     INDICATION: Pain after injury  COMPARISON: None.                                                                      IMPRESSION: The left knee is negative for fracture or significant compartmental narrowing. No significant effusion. The left tibia and fibula are also negative. Minimal Achilles calcaneal spurring.            Review of prior external note(s) from - referring  Review of the result(s) of each unique test - imaging  Independent interpretation of a test performed by another physician/other qualified health care professional (not separately reported) - imaging        Exercises provided today:  3-5 x a wk,  2 x a day    Ankle ABC's  Ankle/lower leg mobility, standing achilles stretch  Towel Scrunches, x 4      Again, thank you for allowing me to participate in the care of your patient.        Sincerely,        Luis Miguel Miller,

## 2023-08-15 NOTE — PATIENT INSTRUCTIONS
Lower extremity injury consistent with contusion.  We reviewed the x-rays, which are reassuring.  Overall function fairly good as well in the lower leg despite some persistent tenderness, swelling, bruising.  Anticipate improvement with time.  Simple motion exercises reviewed today.  May use compression if desired.  Otherwise, icing/cool packs, over-the-counter medication, elevation, rest if needed.  Okay for returning to activities when comfortable doing so.  Follow-up here is open-ended.    If you have any further questions for your physician or physician s care team you can contact them thru [employer] or by calling [phone].

## 2023-09-18 ENCOUNTER — OFFICE VISIT (OUTPATIENT)
Dept: ORTHOPEDICS | Facility: CLINIC | Age: 68
End: 2023-09-18
Payer: COMMERCIAL

## 2023-09-18 DIAGNOSIS — S66.811D RUPTURE OF EXTENSOR TENDONS OF RIGHT HAND AND WRIST, SUBSEQUENT ENCOUNTER: Primary | ICD-10-CM

## 2023-09-18 PROCEDURE — 99212 OFFICE O/P EST SF 10 MIN: CPT | Performed by: STUDENT IN AN ORGANIZED HEALTH CARE EDUCATION/TRAINING PROGRAM

## 2023-09-18 NOTE — LETTER
9/18/2023         RE: Carol Barkley  [address]  [place] [address]        Dear Colleague,    Thank you for referring your patient, Carol Barkley, to the [facility] [place]. Please see a copy of my visit note below.    Date of Service: Sep 18, 2023    Chief Complaint: Post operative follow up.      Date of Surgery: 8/5/22     Procedure Performed: Right extensor indicis proprius to extensor pollicis longus transfer.     Interval events: Carol Barkley is a 68 year old female who presents today for a postoperative follow up. She reports that she is doing well. Thumb is functioning well. No issues with daily tasks. 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 Main residual issue is index finger flexion is still not as good as the other side.    The past medical history was reviewed updated in the EMR. This includes medications, surgeries, social history, and review of systems.    Physical examination:  Well-developed, well-nourished and in no acute distress.  Alert and oriented to surroundings.  On examination of the right hand, incision is well-healed. There is no erythema, drainage, or dehiscence. No swelling. Sensation is intact in median, radial and ulnar nerve distributions. Patient can actively flex and extend all digits. Fires EPL and FPL.  Also diminished flexion at MCP joint of the index finger, tightness felt with passive finger flexion. Fingers are warm and well-perfused. excellent active EPL function with hand flat on the table. Able to oppose the thumb to palmar crease of small finger. AROM of wrist is full.    Assessment: 68 year old female s/p right extensor indicis proprius to extensor pollicis longus transfer, progressing appropriately.      Plan:   Patient is doing well. 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     I did instruct her on passive finger flexion exercises.     Patient voiced understanding and  agreement. All questions answered. At this point, she may follow-up as needed.    Geraldo Fischer MD    Hand, Upper Extremity & Microvascular Surgery  Department of Orthopedic Surgery  [facility]

## 2023-09-18 NOTE — NURSING NOTE
Reason For Visit:   Chief Complaint   Patient presents with    RECHECK     Follow up on right extensor indicis proprius to extensor pollicis longus transfer DOS 8/5/22       Primary MD: Andrew Nair  Ref. MD: sarahi    Age: 68 year old    ?  No      LMP 11/30/2004       Pain Assessment  Patient Currently in Pain: Yes  0-10 Pain Scale: 0  Primary Pain Location: Hand (right)  Pain Descriptors: Sore  Alleviating Factors: Rest    Hand Dominance Evaluation  Hand Dominance: Right          QuickDASH Assessment      9/15/2023     3:27 PM   QuickDASH Main   1. Open a tight or new jar Moderate difficulty   2. Do heavy household chores (e.g., wash walls, floors) No difficulty   3. Carry a shopping bag or briefcase No difficulty   4. Wash your back No difficulty   5. Use a knife to cut food No difficulty   6. Recreational activities in which you take some force or impact through your arm, shoulder or hand (e.g., golf, hammering, tennis, etc.) Unable to answer   7. During the past week, to what extent has your arm, shoulder or hand problem interfered with your normal social activities with family, friends, neighbours or groups Slightly   8. During the past week, were you limited in your work or other regular daily activities as a result of your arm, shoulder or hand problem Slightly limited   9. Arm, shoulder or hand pain Mild   10.Tingling (pins and needles) in your arm,shoulder or hand None   11. During the past week, how much difficulty have you had sleeping because of the pain in your arm, shoulder or hand No difficulty   Quickdash Ability Score 9.09          Current Outpatient Medications   Medication Sig Dispense Refill    clobetasol (TEMOVATE) 0.05 % ointment       fluocinolone (SYNALAR) 0.01 % external solution  (Patient not taking: Reported on 8/15/2023)      fluticasone (FLONASE) 50 MCG/ACT nasal spray Spray 1 spray into both nostrils daily      ketoconazole (NIZORAL) 2 % external shampoo Apply topically  daily as needed for itching or irritation 120 mL 6    losartan (COZAAR) 100 MG tablet Take 1 tablet (100 mg) by mouth daily 90 tablet 11    rosuvastatin (CRESTOR) 20 MG tablet Take 1 tablet (20 mg) by mouth daily 90 tablet 11    Semaglutide-Weight Management (WEGOVY) 0.25 MG/0.5ML pen Inject 0.25 mg Subcutaneous once a week For 4 weeks 2 mL 0    Semaglutide-Weight Management (WEGOVY) 0.5 MG/0.5ML pen Inject 0.5 mg Subcutaneous once a week After completed 4 week 0.25mg 2 mL 1    SUMAtriptan (IMITREX) 25 MG tablet Take 1 tablet (25 mg) by mouth at onset of headache for migraine 9 tablet 0    tretinoin (RETIN-A) 0.025 % cream          Allergies   Allergen Reactions    Dust Mite Extract     Grass     Mold        Brianda Blanc, ATC

## 2023-09-18 NOTE — PROGRESS NOTES
Date of Service: Sep 18, 2023    Chief Complaint: Post operative follow up.      Date of Surgery: 8/5/22     Procedure Performed: Right extensor indicis proprius to extensor pollicis longus transfer.     Interval events: Carol Barkley is a 68 year old female who presents today for a postoperative follow up. She reports that she is doing well. Thumb is functioning well. No issues with daily tasks. 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 Main residual issue is index finger flexion is still not as good as the other side.    The past medical history was reviewed updated in the EMR. This includes medications, surgeries, social history, and review of systems.    Physical examination:  Well-developed, well-nourished and in no acute distress.  Alert and oriented to surroundings.  On examination of the right hand, incision is well-healed. There is no erythema, drainage, or dehiscence. No swelling. Sensation is intact in median, radial and ulnar nerve distributions. Patient can actively flex and extend all digits. Fires EPL and FPL.  Also diminished flexion at MCP joint of the index finger, tightness felt with passive finger flexion. Fingers are warm and well-perfused. excellent active EPL function with hand flat on the table. Able to oppose the thumb to palmar crease of small finger. AROM of wrist is full.    Assessment: 68 year old female s/p right extensor indicis proprius to extensor pollicis longus transfer, progressing appropriately.      Plan:   Patient is doing well. 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     I did instruct her on passive finger flexion exercises.     Patient voiced understanding and agreement. All questions answered. At this point, she may follow-up as needed.    Geraldo Fischer MD    Hand, Upper Extremity & Microvascular Surgery  Department of Orthopedic Surgery  [facility]

## 2023-10-30 ENCOUNTER — ANCILLARY PROCEDURE (OUTPATIENT)
Dept: MAMMOGRAPHY | Facility: CLINIC | Age: 68
End: 2023-10-30
Attending: FAMILY MEDICINE
Payer: COMMERCIAL

## 2023-10-30 DIAGNOSIS — Z12.31 VISIT FOR SCREENING MAMMOGRAM: ICD-10-CM

## 2023-10-30 PROCEDURE — 77067 SCR MAMMO BI INCL CAD: CPT | Mod: GC

## 2023-10-30 PROCEDURE — 77063 BREAST TOMOSYNTHESIS BI: CPT | Mod: GC

## 2023-12-01 ASSESSMENT — ENCOUNTER SYMPTOMS
HEMATOCHEZIA: 0
EYE PAIN: 0
ABDOMINAL PAIN: 0
PALPITATIONS: 0
NAUSEA: 0
CONSTIPATION: 1
HEADACHES: 0
WEAKNESS: 0
DYSURIA: 0
JOINT SWELLING: 0
DIZZINESS: 0
FEVER: 0
BREAST MASS: 0
DIARRHEA: 0
SHORTNESS OF BREATH: 0
MYALGIAS: 1
SORE THROAT: 0
CHILLS: 0
NERVOUS/ANXIOUS: 0
ARTHRALGIAS: 1
COUGH: 0
FREQUENCY: 0
HEARTBURN: 0
PARESTHESIAS: 0

## 2023-12-01 ASSESSMENT — ACTIVITIES OF DAILY LIVING (ADL): CURRENT_FUNCTION: NO ASSISTANCE NEEDED

## 2023-12-04 ENCOUNTER — PATIENT OUTREACH (OUTPATIENT)
Dept: ONCOLOGY | Facility: CLINIC | Age: 68
End: 2023-12-04

## 2023-12-04 ENCOUNTER — OFFICE VISIT (OUTPATIENT)
Dept: FAMILY MEDICINE | Facility: CLINIC | Age: 68
End: 2023-12-04
Payer: COMMERCIAL

## 2023-12-04 VITALS
OXYGEN SATURATION: 99 % | HEART RATE: 69 BPM | BODY MASS INDEX: 30.66 KG/M2 | HEIGHT: 65 IN | RESPIRATION RATE: 18 BRPM | SYSTOLIC BLOOD PRESSURE: 133 MMHG | WEIGHT: 184 LBS | DIASTOLIC BLOOD PRESSURE: 86 MMHG | TEMPERATURE: 97.8 F

## 2023-12-04 DIAGNOSIS — R53.83 OTHER FATIGUE: ICD-10-CM

## 2023-12-04 DIAGNOSIS — E78.5 HYPERLIPIDEMIA LDL GOAL <100: ICD-10-CM

## 2023-12-04 DIAGNOSIS — I10 BENIGN ESSENTIAL HYPERTENSION: ICD-10-CM

## 2023-12-04 DIAGNOSIS — Z80.3 FAMILY HISTORY OF MALIGNANT NEOPLASM OF BREAST: ICD-10-CM

## 2023-12-04 DIAGNOSIS — Z00.00 ENCOUNTER FOR MEDICARE ANNUAL WELLNESS EXAM: ICD-10-CM

## 2023-12-04 DIAGNOSIS — H91.93 BILATERAL HEARING LOSS, UNSPECIFIED HEARING LOSS TYPE: ICD-10-CM

## 2023-12-04 DIAGNOSIS — M48.061 SPINAL STENOSIS OF LUMBAR REGION WITHOUT NEUROGENIC CLAUDICATION: ICD-10-CM

## 2023-12-04 DIAGNOSIS — Z12.39 BREAST CANCER SCREENING, HIGH RISK PATIENT: ICD-10-CM

## 2023-12-04 DIAGNOSIS — Z78.0 ENCOUNTER FOR OSTEOPOROSIS SCREENING IN ASYMPTOMATIC POSTMENOPAUSAL PATIENT: ICD-10-CM

## 2023-12-04 DIAGNOSIS — Z13.820 ENCOUNTER FOR OSTEOPOROSIS SCREENING IN ASYMPTOMATIC POSTMENOPAUSAL PATIENT: ICD-10-CM

## 2023-12-04 LAB
ALBUMIN SERPL BCG-MCNC: 4.3 G/DL (ref 3.5–5.2)
ALP SERPL-CCNC: 73 U/L (ref 40–150)
ALT SERPL W P-5'-P-CCNC: 20 U/L (ref 0–50)
ANION GAP SERPL CALCULATED.3IONS-SCNC: 9 MMOL/L (ref 7–15)
AST SERPL W P-5'-P-CCNC: 21 U/L (ref 0–45)
BILIRUB SERPL-MCNC: 0.4 MG/DL
BUN SERPL-MCNC: 13.1 MG/DL (ref 8–23)
CALCIUM SERPL-MCNC: 9.1 MG/DL (ref 8.8–10.2)
CHLORIDE SERPL-SCNC: 106 MMOL/L (ref 98–107)
CHOLEST SERPL-MCNC: 148 MG/DL
CREAT SERPL-MCNC: 0.85 MG/DL (ref 0.51–0.95)
DEPRECATED HCO3 PLAS-SCNC: 26 MMOL/L (ref 22–29)
EGFRCR SERPLBLD CKD-EPI 2021: 74 ML/MIN/1.73M2
ERYTHROCYTE [DISTWIDTH] IN BLOOD BY AUTOMATED COUNT: 12.5 % (ref 10–15)
GLUCOSE SERPL-MCNC: 102 MG/DL (ref 70–99)
HCT VFR BLD AUTO: 38.5 % (ref 35–47)
HDLC SERPL-MCNC: 63 MG/DL
HGB BLD-MCNC: 12.7 G/DL (ref 11.7–15.7)
LDLC SERPL CALC-MCNC: 70 MG/DL
MCH RBC QN AUTO: 31.1 PG (ref 26.5–33)
MCHC RBC AUTO-ENTMCNC: 33 G/DL (ref 31.5–36.5)
MCV RBC AUTO: 94 FL (ref 78–100)
NONHDLC SERPL-MCNC: 85 MG/DL
PLATELET # BLD AUTO: 217 10E3/UL (ref 150–450)
POTASSIUM SERPL-SCNC: 4.1 MMOL/L (ref 3.4–5.3)
PROT SERPL-MCNC: 6.7 G/DL (ref 6.4–8.3)
RBC # BLD AUTO: 4.08 10E6/UL (ref 3.8–5.2)
SODIUM SERPL-SCNC: 141 MMOL/L (ref 135–145)
TRIGL SERPL-MCNC: 77 MG/DL
TSH SERPL DL<=0.005 MIU/L-ACNC: 2.3 UIU/ML (ref 0.3–4.2)
WBC # BLD AUTO: 5.2 10E3/UL (ref 4–11)

## 2023-12-04 PROCEDURE — G0439 PPPS, SUBSEQ VISIT: HCPCS | Performed by: INTERNAL MEDICINE

## 2023-12-04 PROCEDURE — 80061 LIPID PANEL: CPT | Performed by: INTERNAL MEDICINE

## 2023-12-04 PROCEDURE — 36415 COLL VENOUS BLD VENIPUNCTURE: CPT | Performed by: INTERNAL MEDICINE

## 2023-12-04 PROCEDURE — 80053 COMPREHEN METABOLIC PANEL: CPT | Performed by: INTERNAL MEDICINE

## 2023-12-04 PROCEDURE — 85027 COMPLETE CBC AUTOMATED: CPT | Performed by: INTERNAL MEDICINE

## 2023-12-04 PROCEDURE — 99214 OFFICE O/P EST MOD 30 MIN: CPT | Mod: 25 | Performed by: INTERNAL MEDICINE

## 2023-12-04 PROCEDURE — 84443 ASSAY THYROID STIM HORMONE: CPT | Performed by: INTERNAL MEDICINE

## 2023-12-04 RX ORDER — FLUOCINONIDE TOPICAL SOLUTION USP, 0.05% 0.5 MG/ML
1 SOLUTION TOPICAL DAILY
COMMUNITY
Start: 2023-11-10

## 2023-12-04 RX ORDER — TRETINOIN 1 MG/G
CREAM TOPICAL AT BEDTIME
COMMUNITY
Start: 2023-11-13

## 2023-12-04 ASSESSMENT — ENCOUNTER SYMPTOMS
ARTHRALGIAS: 1
JOINT SWELLING: 0
HEADACHES: 0
BREAST MASS: 0
MYALGIAS: 1
CONSTIPATION: 1
DIARRHEA: 0
NERVOUS/ANXIOUS: 0
HEMATOCHEZIA: 0
EYE PAIN: 0
WEAKNESS: 0
HEARTBURN: 0
SORE THROAT: 0
COUGH: 0
PARESTHESIAS: 0
DIZZINESS: 0
PALPITATIONS: 0
SHORTNESS OF BREATH: 0
ABDOMINAL PAIN: 0
CHILLS: 0
FEVER: 0
NAUSEA: 0
FREQUENCY: 0
DYSURIA: 0

## 2023-12-04 ASSESSMENT — ACTIVITIES OF DAILY LIVING (ADL): CURRENT_FUNCTION: NO ASSISTANCE NEEDED

## 2023-12-04 NOTE — PROGRESS NOTES
Writer received referral to Cancer Risk Management/Genetic Counseling.    Referred for: Sang Chris score 24.6% high risk for breast cancer; wondering about breast cancer screening;     pt with family history of breast cancer, has not seen genetic counselor    Per referring note: Mom  of breast cancer- would like to discuss breast cancer risk and if should change screening.  Mom was diagnosed with inflammatory breast cancer at age 60- did go into remission for 10 years.  She did take HRT.     Referral reviewed for appropriate plan, and sent to New Patient Scheduling for completion.    Elvira Elliott, RN, BSN  Oncology New Patient Nurse Navigator   [facility]  [phone]

## 2023-12-04 NOTE — PROGRESS NOTES
"SUBJECTIVE:   Carol is a 68 year old, presenting for the following:  Medicare Visit        2023     8:58 AM   Additional Questions   Roomed by Mitra       Are you in the first 12 months of your Medicare coverage?  No    Healthy Habits:     In general, how would you rate your overall health?  Good    Frequency of exercise:  4-5 days/week    Duration of exercise:  15-30 minutes    Do you usually eat at least 4 servings of fruit and vegetables a day, include whole grains    & fiber and avoid regularly eating high fat or \"junk\" foods?  Yes    Taking medications regularly:  Yes    Medication side effects:  Lightheadedness    Ability to successfully perform activities of daily living:  No assistance needed    Home Safety:  Lack of grab bars in the bathroom    Hearing Impairment:  Difficulty following a conversation in a noisy restaurant or crowded room and difficulty understanding soft or whispered speech    In the past 6 months, have you been bothered by leaking of urine?  No    In general, how would you rate your overall mental or emotional health?  Excellent    Additional concerns today:  Yes      Today's PHQ-2 Score:       12/3/2023     2:57 PM   PHQ-2 (  [employer])   Q1: Little interest or pleasure in doing things 0   Q2: Feeling down, depressed or hopeless 0   PHQ-2 Score 0   Q1: Little interest or pleasure in doing things Not at all   Q2: Feeling down, depressed or hopeless Not at all   PHQ-2 Score 0       Mom  of breast cancer- would like to discuss breast cancer risk and if should change screening.  Mom was diagnosed with inflammatory breast cancer at age 60- did go into remission for 10 years.  She did take HRT.    Toes 3 and 4 on right foot get numb.  Walking is her exercise.  Her back has been fairly messed up causing pain. Had MRI 23.  Has history of L5-S1 laminectomy.     Sometimes feels like she doesn't have any energy. Is also struggling more with constipation.    With blood pressure control, " migraines much better.     Takes stool softener at night.  Had colonoscopy with rectocele.    Last year tripped while walking the dog and broke her arm- fell on outstretched arm.     Sang Perez risk calculation: The [place] population s average 10-year risk is 3.00% for women of the same age and race/ethnicity. Based on the information provided, the patient s estimated risk for developing invasive breast cancer in her lifetime is 26.30%. The [place] population s average lifetime risk is 4.60% for women of the same age and race/ethnicity.    Women deemed to be at high lifetime risk of breast cancer (defined as a risk of at least 20 percent) should undergo annual screening mammogram, annual breast magnetic resonance imaging (MRI), and at age 21, clinical breast exam every 6 to 12 months.     History of hypertension, hld, granuloma lung, fh of br ca in mom & lung cancer in sister, episodic left posterior chest pain many years, deconditioned, fh of IHD/ stroke, hx of resolved palpitations, hx of anxiety in 2016, hx of leg pain & mini-laminectomy , hx of fall wrist fracture & surgical repair & post op right extensor tendon rupture & repair, seasonal allergies, ocular rosacea, hordeolum, no longer on doxycycline, posterior vitreous disorder, raynaud's, resolved stress incontinence, hx of Covid sept 2022, shingles left jaw nov 2022, hx of colonic polyps, breast Bx, earnest  Lichen sclerosis- pelvis    Going to [place] with  and grandkids tomorrow.      Have you ever done Advance Care Planning? (For example, a Health Directive, POLST, or a discussion with a medical provider or your loved ones about your wishes): No, advance care planning information given to patient to review.  Advanced care planning was discussed at today's visit.       Fall risk  Fallen 2 or more times in the past year?: Yes  Any fall with injury in the past year?: Yes  Get and Go Test: 10 seconds       Do you have sleep apnea, excessive snoring or  daytime drowsiness? : unknown . Hard time sleeping. Deviated septum     Reviewed and updated as needed this visit by clinical staff   Tobacco  Allergies  Meds              Reviewed and updated as needed this visit by Provider                 Social History     Tobacco Use    Smoking status: Never    Smokeless tobacco: Never   Substance Use Topics    Alcohol use: No     Comment: 1 drinks per week             12/1/2023     1:44 PM   Alcohol Use   Prescreen: >3 drinks/day or >7 drinks/week? No     Do you have a current opioid prescription? No  Do you use any other controlled substances or medications that are not prescribed by a provider? None    Current providers sharing in care for this patient include:   Patient Care Team:  SREEDHAR SILVESTRE as PCP - General (Family Practice)  Jhony Byers MD as MD (Otolaryngology)  Avtar Sears OD as Assigned Surgical Provider  Arnold Ji MD as MD (Cardiovascular Disease)  Korin Ramirez AuD as Audiologist (Audiology)  Samantha Sánchez MD as MD (Family Medicine)  Tresa Alvarez GC as Genetic Counselor (Genetic Counselor, MS)  Samantha Sánchez MD as Assigned PCP  Arnold Ji MD as Assigned Heart and Vascular Provider  Sebastien Rosales DO as Assigned Neuroscience Provider  Geraldo Fischer MD as Assigned Musculoskeletal Provider    The following health maintenance items are reviewed in Epic and correct as of today:  Health Maintenance   Topic Date Due    DEXA  Never done    RSV VACCINE (Pregnancy & 60+) (1 - 1-dose 60+ series) Never done    ZOSTER IMMUNIZATION (2 of 3) 01/29/2016    MEDICARE ANNUAL WELLNESS VISIT  12/13/2023    ANNUAL REVIEW OF HM ORDERS  12/13/2023    FALL RISK ASSESSMENT  12/04/2024    MAMMO SCREENING  10/30/2025    LIPID  12/13/2027    ADVANCE CARE PLANNING  12/14/2027    COLORECTAL CANCER SCREENING  02/17/2028    DTAP/TDAP/TD IMMUNIZATION (3 - Td or Tdap) 12/13/2032    HEPATITIS C SCREENING  Completed    MIGRAINE ACTION PLAN  Completed     PHQ-2 (once per calendar year)  Completed    INFLUENZA VACCINE  Completed    Pneumococcal Vaccine: 65+ Years  Completed    COVID-19 Vaccine  Completed    IPV IMMUNIZATION  Aged Out    HPV IMMUNIZATION  Aged Out    MENINGITIS IMMUNIZATION  Aged Out    RSV MONOCLONAL ANTIBODY  Aged Out     Lab work is in process    FHS-7:       8/30/2022    10:14 AM 12/12/2022    10:46 AM 10/30/2023     9:35 AM   Breast CA Risk Assessment (FHS-7)   Did any of your first-degree relatives have breast or ovarian cancer? Yes Yes Yes   Did any of your relatives have bilateral breast cancer? No Unknown No   Did any man in your family have breast cancer? No Unknown No   Did any woman in your family have breast and ovarian cancer? No Unknown No   Did any woman in your family have breast cancer before age 50 y? No Unknown No   Do you have 2 or more relatives with breast and/or ovarian cancer? Yes Yes Yes   Do you have 2 or more relatives with breast and/or bowel cancer? Yes Yes Yes     Mammogram Screening: see discussion  Pertinent mammograms are reviewed under the imaging tab.    Review of Systems   Constitutional:  Negative for chills and fever.   HENT:  Positive for congestion and hearing loss. Negative for ear pain and sore throat.    Eyes:  Negative for pain and visual disturbance.   Respiratory:  Negative for cough and shortness of breath.    Cardiovascular:  Negative for palpitations and peripheral edema.   Gastrointestinal:  Positive for constipation. Negative for abdominal pain, diarrhea, heartburn, hematochezia and nausea.   Breasts:  Negative for tenderness, breast mass and discharge.   Genitourinary:  Negative for dysuria, frequency, genital sores, pelvic pain, urgency, vaginal bleeding and vaginal discharge.   Musculoskeletal:  Positive for arthralgias and myalgias. Negative for joint swelling.   Skin:  Negative for rash.   Neurological:  Negative for dizziness, weakness, headaches and paresthesias.   Psychiatric/Behavioral:   "Negative for mood changes. The patient is not nervous/anxious.        OBJECTIVE:   /86 (Cuff Size: Adult Regular)   Pulse 69   Temp 97.8  F (36.6  C) (Oral)   Resp 18   Ht 1.657 m (5' 5.25\")   Wt 83.5 kg (184 lb)   LMP 11/30/2004   SpO2 99%   BMI 30.39 kg/m   Estimated body mass index is 30.39 kg/m  as calculated from the following:    Height as of this encounter: 1.657 m (5' 5.25\").    Weight as of this encounter: 83.5 kg (184 lb).  Physical Exam  GENERAL APPEARANCE: healthy, alert and no distress  EYES: Eyes grossly normal to inspection, PERRL and conjunctivae and sclerae normal  HENT: ear canals and TM's normal, nose and mouth without ulcers or lesions, oropharynx clear and oral mucous membranes moist  NECK: no adenopathy, no asymmetry, masses, or scars and thyroid normal to palpation  RESP: lungs clear to auscultation - no rales, rhonchi or wheezes  BREAST: normal without masses, tenderness or nipple discharge and no palpable axillary masses or adenopathy  CV: regular rate and rhythm, normal S1 S2, no S3 or S4, no murmur, click or rub, no peripheral edema and peripheral pulses strong  ABDOMEN: soft, nontender, no hepatosplenomegaly, no masses and bowel sounds normal  MS: no musculoskeletal defects are noted and gait is age appropriate without ataxia  SKIN: no suspicious lesions or rashes  NEURO: Normal strength and tone, sensory exam grossly normal, mentation intact and speech normal  PSYCH: mentation appears normal and affect normal/bright    Diagnostic Test Results:  Labs reviewed in Epic    ASSESSMENT / PLAN:   Carol was seen today for medicare visit.    Diagnoses and all orders for this visit:    Encounter for Medicare annual wellness exam  Mammo: 10/30/23, high risk based on calculation above- will refer to genetics/cancer risk assessment team to consider breast MRI.  Pap: NIL 2010  Colon: 2/17/23  DEXA: due, ordered  Immunizations: Recommended Shingles, RSV  Labs: Lipids check " "today  Discussed healthy lifestyle and aging recommendations    Benign essential hypertension  Close to goal 130/80 today- will check at home and let me know if consistently above goal.  - continue losartan 100 mg daily  - Check labs today    Hyperlipidemia LDL goal <100  Continue rosuvastatin 20 mg daily- check labs today.  -     Lipid panel reflex to direct LDL Non-fasting; Future  -     Comprehensive metabolic panel (BMP + Alb, Alk Phos, ALT, AST, Total. Bili, TP); Future  -     Lipid panel reflex to direct LDL Non-fasting  -     Comprehensive metabolic panel (BMP + Alb, Alk Phos, ALT, AST, Total. Bili, TP)    Family history of malignant neoplasm of breast  - Risk score calculated as above    Spinal stenosis of lumbar region without neurogenic claudication  Comments:  Refer to spine surgery [facility]- having back pain wtih walking.  Orders:  -     Spine  Referral; Future    Other fatigue  Comments:  Associated with constipation; check TSH and CBC today.  Orders:  -     CBC with platelets; Future  -     TSH with free T4 reflex; Future  -     CBC with platelets  -     TSH with free T4 reflex    Encounter for osteoporosis screening in asymptomatic postmenopausal patient  -     DX Hip/Pelvis/Spine; Future    Bilateral hearing loss, unspecified hearing loss type  -     Adult Audiology  Referral; Future    Breast cancer screening, high risk patient  Comments:  Reji risk 26.3%  Orders:  -     Adult Oncology/Hematology  Referral; Future    Other orders  -     PRIMARY CARE FOLLOW-UP SCHEDULING; Future        Patient has been advised of split billing requirements and indicates understanding: Yes      COUNSELING:  Reviewed preventive health counseling, as reflected in patient instructions      BMI:   Estimated body mass index is 30.39 kg/m  as calculated from the following:    Height as of this encounter: 1.657 m (5' 5.25\").    Weight as of this encounter: 83.5 kg (184 lb).         She reports " that she has never smoked. She has never used smokeless tobacco.      Appropriate preventive services were discussed with this patient, including applicable screening as appropriate for fall prevention, nutrition, physical activity, Tobacco-use cessation, weight loss and cognition.  Checklist reviewing preventive services available has been given to the patient.    Reviewed patients plan of care and provided an AVS. The Basic Care Plan (routine screening as documented in Health Maintenance) for Carol meets the Care Plan requirement. This Care Plan has been established and reviewed with the Patient.        Precious Akins, [facility]    Identified Health Risks:  I have reviewed Opioid Use Disorder and Substance Use Disorder risk factors and made any needed referrals. The patient was provided with written information regarding signs of hearing loss.

## 2023-12-04 NOTE — PATIENT INSTRUCTIONS
- I recommend getting your shingles vaccine (2 doses, at least 2 months apart)- people tend to feel sick with this one for about 24 hours    -     Patient Education   Personalized Prevention Plan  You are due for the preventive services outlined below.  Your care team is available to assist you in scheduling these services.  If you have already completed any of these items, please share that information with your care team to update in your medical record.  Health Maintenance Due   Topic Date Due    Osteoporosis Screening  Never done    RSV VACCINE (Pregnancy & 60+) (1 - 1-dose 60+ series) Never done    Zoster (Shingles) Vaccine (2 of 3) 01/29/2016    ANNUAL REVIEW OF  ORDERS  12/13/2023     Hearing Loss: Care Instructions  Overview     Hearing loss is a sudden or slow decrease in how well you hear. It can range from slight to profound. Permanent hearing loss can occur with aging. It also can happen when you are exposed long-term to loud noise. Examples include listening to loud music, riding motorcycles, or being around other loud machines.  Hearing loss can affect your work and home life. It can make you feel lonely or depressed. You may feel that you have lost your independence. But hearing aids and other devices can help you hear better and feel connected to others.  Follow-up care is a key part of your treatment and safety. Be sure to make and go to all appointments, and call your doctor if you are having problems. It's also a good idea to know your test results and keep a list of the medicines you take.  How can you care for yourself at home?  Avoid loud noises whenever possible. This helps keep your hearing from getting worse.  Always wear hearing protection around loud noises.  Wear a hearing aid as directed.  A professional can help you pick a hearing aid that will work best for you.  You can also get hearing aids over the counter for mild to moderate hearing loss.  Have hearing tests as your doctor  "suggests. They can show whether your hearing has changed. Your hearing aid may need to be adjusted.  Use other devices as needed. These may include:  Telephone amplifiers and hearing aids that can connect to a television, stereo, radio, or microphone.  Devices that use lights or vibrations. These alert you to the doorbell, a ringing telephone, or a baby monitor.  Television closed-captioning. This shows the words at the bottom of the screen. Most new TVs can do this.  TTY (text telephone). This lets you type messages back and forth on the telephone instead of talking or listening. These devices are also called TDD. When messages are typed on the keyboard, they are sent over the phone line to a receiving TTY. The message is shown on a monitor.  Use text messaging, social media, and email if it is hard for you to communicate by telephone.  Try to learn a listening technique called speechreading. It is not lipreading. You pay attention to people's gestures, expressions, posture, and tone of voice. These clues can help you understand what a person is saying. Face the person you are talking to, and have them face you. Make sure the lighting is good. You need to see the other person's face clearly.  Think about counseling if you need help to adjust to your hearing loss.  When should you call for help?  Watch closely for changes in your health, and be sure to contact your doctor if:    You think your hearing is getting worse.     You have new symptoms, such as dizziness or nausea.   Where can you learn more?  Go to https://www.[employer].net/patiented  Enter R798 in the search box to learn more about \"Hearing Loss: Care Instructions.\"  Current as of: February 28, 2023               Content Version: 13.8    3906-1207 [employer].   Care instructions adapted under license by your healthcare professional. If you have questions about a medical condition or this instruction, always ask your healthcare professional. " [employer] disclaims any warranty or liability for your use of this information.

## 2024-01-14 ENCOUNTER — MYC MEDICAL ADVICE (OUTPATIENT)
Dept: FAMILY MEDICINE | Facility: CLINIC | Age: 69
End: 2024-01-14
Payer: COMMERCIAL

## 2024-01-15 ENCOUNTER — NURSE TRIAGE (OUTPATIENT)
Dept: FAMILY MEDICINE | Facility: CLINIC | Age: 69
End: 2024-01-15

## 2024-01-15 ENCOUNTER — E-VISIT (OUTPATIENT)
Dept: FAMILY MEDICINE | Facility: CLINIC | Age: 69
End: 2024-01-15
Payer: COMMERCIAL

## 2024-01-15 DIAGNOSIS — U07.1 INFECTION DUE TO 2019 NOVEL CORONAVIRUS: Primary | ICD-10-CM

## 2024-01-15 DIAGNOSIS — Z86.16 HISTORY OF COVID-19: Primary | ICD-10-CM

## 2024-01-15 PROCEDURE — 99207 PR NON-BILLABLE SERV PER CHARTING: CPT | Performed by: FAMILY MEDICINE

## 2024-01-15 NOTE — TELEPHONE ENCOUNTER
RN COVID TREATMENT VISIT  01/15/24      The patient has been triaged and does not require a higher level of care.    Carol Barkley  68 year old  Current weight? 184 lbs    Has the patient been seen by a primary care provider at an [facility] or [facility] within the past two years? Yes.   Have you been in close proximity to/do you have a known exposure to a person with a confirmed case of influenza? No.     General treatment eligibility:  Date of positive COVID test (PCR or at home)?  1-    Are you or have you been hospitalized for this COVID-19 infection? No.   Have you received monoclonal antibodies or antiviral treatment for COVID-19 since this positive test? No.   Do you have any of the following conditions that place you at risk of being very sick from COVID-19?   - Age 50 years or older  - Heart conditions such as cardiomyopathies, congenital heart defects, coronary artery disease, heart arrhythmias, heart failure, hypertension, valve disorders   - Overweight or Obesity (BMI >85th percentile or BMI 25 or higher)  Yes, patient has at least one high risk condition as noted above.     Current COVID symptoms:   - cough  - fatigue  - headache  - sore throat  - congestion or runny nose  Yes. Patient has at least one symptom as selected.     How many days since symptoms started? 5 days or less. Established patient, 12 years or older weighing at least 88.2 lbs, who has symptoms that started in the past 5 days, has not been hospitalized nor received treatment already, and is at risk for being very sick from COVID-19.     Treatment eligibility by RN:  Are you currently pregnant or nursing? No  Do you have a clinically significant hypersensitivity to nirmatrelvir or ritonavir, or toxic epidermal necrolysis (TEN) or Mukherjee-Regis Syndrome? No  Do you have a history of hepatitis, any hepatic impairment on the Problem List (such as Child-Gallegos Class C, cirrhosis, fatty liver disease,  alcoholic liver disease), or was the last liver lab (hepatic panel, ALT, AST, ALK Phos, bilirubin) elevated in the past 6 months? No  Do you have any history of severe renal impairment (eGFR < 30mL/min)? No    Is patient eligible to continue? Yes, patient meets all eligibility requirements for the RN COVID treatment (as denoted by all no responses above).     Current Outpatient Medications   Medication Sig Dispense Refill    clobetasol (TEMOVATE) 0.05 % ointment       fluocinonide (LIDEX) 0.05 % external solution Apply 1 Application topically daily      fluticasone (FLONASE) 50 MCG/ACT nasal spray Spray 1 spray into both nostrils daily      ketoconazole (NIZORAL) 2 % external shampoo Apply topically daily as needed for itching or irritation 120 mL 6    losartan (COZAAR) 100 MG tablet Take 1 tablet (100 mg) by mouth daily 90 tablet 11    rosuvastatin (CRESTOR) 20 MG tablet Take 1 tablet (20 mg) by mouth daily 90 tablet 11    SUMAtriptan (IMITREX) 25 MG tablet Take 1 tablet (25 mg) by mouth at onset of headache for migraine 9 tablet 0    tretinoin (RETIN-A) 0.1 % external cream Apply topically at bedtime         Medications from List 1 of the standing order (on medications that exclude the use of Paxlovid) that patient is taking: NONE. Is patient taking Chesnee's Wort? No  Is patient taking Lucian's Wort or any meds from List 1? No.   Medications from List 2 of the standing order (on meds that provider needs to adjust) that patient is taking: NONE. Is patient on any of the meds from List 2? No.   Medications from List 3 of standing order (on meds that a RN needs to adjust) that patient is taking: rosuvastatin (Crestor): Instructed patient to stop rosuvastatin while taking Paxlovid and restart rosuvastatin 1 day after the completion of Paxlovid.  Is patient on any meds from List 3? Yes. Patient is on meds from list 3. No meds require a provider visit and at least one med required RN to adjust.     Paxlovid has an  approximate 90% reduction in hospitalization. Paxlovid can possibly cause altered sense of taste, diarrhea (loose, watery stools), high blood pressure, muscle aches.     Would patient like a Paxlovid prescription?   Yes.   Lab Results   Component Value Date    GFRESTIMATED 74 12/04/2023       Was last eGFR reduced? No, eGFR 60 or greater/ No Result on record. Patient can receive the normal renal function dose. Paxlovid Rx sent to [facility]   Preferred    Temporary change to home medications: See above    All medication adjustments (holds, etc) were discussed with the patient and patient was asked to repeat back (teachback) their med adjustment.  Did patient understand med adjustment? Yes, patient repeated back and understood correctly.        Reviewed the following instructions with the patient:    Paxlovid (nimatrelvir and ritonavir)    How it works  Two medicines (nirmatrelvir and ritonavir) are taken together. They stop the virus from growing. Less amount of virus is easier for your body to fight.    How to take  Medicine comes in a daily container with both medicine tablets. Take by mouth twice daily (once in the morning, once at night) for 5 days.  The number of tablets to take varies by patient.  Don't chew or break capsules. Swallow whole.    When to take  Take as soon as possible after positive COVID-19 test result, and within 5 days of your first symptoms.    Possible side effects  Can cause altered sense of taste, diarrhea (loose, watery stools), high blood pressure, muscle aches.    Iman Mariano RN       Additional Information   Negative: SEVERE difficulty breathing (e.g., struggling for each breath, speaks in single words)   Negative: Difficult to awaken or acting confused (e.g., disoriented, slurred speech)   Negative: Bluish (or gray) lips or face now   Negative: Shock suspected (e.g., cold/pale/clammy skin, too weak to stand, low BP, rapid pulse)   Negative: Sounds like a  life-threatening emergency to the triager   Negative: SEVERE or constant chest pain or pressure  (Exception: Mild central chest pain, present only when coughing.)   Negative: MODERATE difficulty breathing (e.g., speaks in phrases, SOB even at rest, pulse 100-120)   Negative: Headache and stiff neck (can't touch chin to chest)   Negative: Oxygen level (e.g., pulse oximetry) 90% or lower   Negative: Chest pain or pressure  (Exception: MILD central chest pain, present only when coughing.)   Negative: Drinking very little and dehydration suspected (e.g., no urine > 12 hours, very dry mouth, very lightheaded)   Negative: Patient sounds very sick or weak to the triager    Protocols used: Coronavirus (COVID-19) Diagnosed or [id number]

## 2024-01-15 NOTE — TELEPHONE ENCOUNTER
Routing to RN treatment team.    TANYA Downey, FRANCESN, RN  [facility]

## 2024-01-15 NOTE — TELEPHONE ENCOUNTER
Provider E-Visit time total (minutes): 2  Jackson Medina,    Thank you for starting an E-Visit.  Unfortunately, your concern is too complex for an E-visit and would be best managed with a call to the nurse to determine criteria & evaluate neds you are on as there are a lot of interactions with meds & paxlovid. There is a protocol in place & someone will be calling you shortly     We will cancel this e-visit so you are not charged.    Sincerely,    [facility]   PRE-OP DIAGNOSIS:  H/O  section 2019 11:40:40  Tejal Wang

## 2024-03-01 ENCOUNTER — PATIENT OUTREACH (OUTPATIENT)
Dept: GASTROENTEROLOGY | Facility: CLINIC | Age: 69
End: 2024-03-01
Payer: COMMERCIAL

## 2024-03-08 DIAGNOSIS — E78.00 ELEVATED LDL CHOLESTEROL LEVEL: ICD-10-CM

## 2024-03-08 DIAGNOSIS — I10 BENIGN ESSENTIAL HYPERTENSION: ICD-10-CM

## 2024-03-08 RX ORDER — ROSUVASTATIN CALCIUM 20 MG/1
20 TABLET, COATED ORAL DAILY
Qty: 90 TABLET | Refills: 11 | Status: CANCELLED | OUTPATIENT
Start: 2024-03-08

## 2024-03-08 RX ORDER — LOSARTAN POTASSIUM 100 MG/1
100 TABLET ORAL DAILY
Qty: 90 TABLET | Refills: 11 | Status: CANCELLED | OUTPATIENT
Start: 2024-03-08

## 2024-03-13 ENCOUNTER — MYC REFILL (OUTPATIENT)
Dept: CARDIOLOGY | Facility: CLINIC | Age: 69
End: 2024-03-13
Payer: COMMERCIAL

## 2024-03-13 DIAGNOSIS — I10 BENIGN ESSENTIAL HYPERTENSION: ICD-10-CM

## 2024-03-13 DIAGNOSIS — E78.00 ELEVATED LDL CHOLESTEROL LEVEL: ICD-10-CM

## 2024-03-14 RX ORDER — ROSUVASTATIN CALCIUM 20 MG/1
20 TABLET, COATED ORAL DAILY
Qty: 90 TABLET | Refills: 11 | Status: SHIPPED | OUTPATIENT
Start: 2024-03-14

## 2024-03-14 RX ORDER — LOSARTAN POTASSIUM 100 MG/1
100 TABLET ORAL DAILY
Qty: 90 TABLET | Refills: 11 | Status: SHIPPED | OUTPATIENT
Start: 2024-03-14

## 2024-03-14 NOTE — TELEPHONE ENCOUNTER
Per patient she saw cardiology once, requesting PCP to manage. Please review patient message, will be out after today-thanks!

## 2024-04-20 ENCOUNTER — OFFICE VISIT (OUTPATIENT)
Dept: URGENT CARE | Facility: URGENT CARE | Age: 69
End: 2024-04-20
Payer: COMMERCIAL

## 2024-04-20 VITALS
SYSTOLIC BLOOD PRESSURE: 110 MMHG | DIASTOLIC BLOOD PRESSURE: 62 MMHG | BODY MASS INDEX: 30.88 KG/M2 | OXYGEN SATURATION: 100 % | RESPIRATION RATE: 16 BRPM | TEMPERATURE: 97.9 F | HEART RATE: 72 BPM | WEIGHT: 187 LBS

## 2024-04-20 DIAGNOSIS — B02.9 HERPES ZOSTER WITHOUT COMPLICATION: ICD-10-CM

## 2024-04-20 DIAGNOSIS — H92.02 LEFT EAR PAIN: ICD-10-CM

## 2024-04-20 DIAGNOSIS — B00.9 HERPES SIMPLEX VIRUS INFECTION: Primary | ICD-10-CM

## 2024-04-20 DIAGNOSIS — H61.22 IMPACTED CERUMEN OF LEFT EAR: ICD-10-CM

## 2024-04-20 PROCEDURE — 69209 REMOVE IMPACTED EAR WAX UNI: CPT | Mod: LT | Performed by: PHYSICIAN ASSISTANT

## 2024-04-20 PROCEDURE — 99213 OFFICE O/P EST LOW 20 MIN: CPT | Mod: 25 | Performed by: PHYSICIAN ASSISTANT

## 2024-04-20 RX ORDER — VALACYCLOVIR HYDROCHLORIDE 1 G/1
1000 TABLET, FILM COATED ORAL 3 TIMES DAILY
Qty: 21 TABLET | Refills: 0 | Status: SHIPPED | OUTPATIENT
Start: 2024-04-20 | End: 2024-04-27

## 2024-04-20 NOTE — PROGRESS NOTES
Assessment & Plan     Herpes simplex virus infection    Cold sores are clusters of small blisters on the lip and skin around or inside the mouth. Often the first sign of a cold sore is a spot that tingles, burns, or itches. A blister usually forms within 24 hours. They are sometimes called fever blisters. The skin around the blisters can be red and inflamed. The blisters can break open, weep a clear fluid, and then scab over after a few days. Cold sores often heal in 7 to 10 days with no scar.  Cold sores are caused by the herpes simplex virus. The virus is spread by skin-to-skin contact. That means that if you have a cold sore and kiss another person, that person could get a cold sore too.    - valACYclovir (VALTREX) 1000 mg tablet; Take 1 tablet (1,000 mg) by mouth 3 times daily for 7 days    Herpes zoster without complication    Shingles (herpes zoster) causes pain and a blistered rash. The rash can appear anywhere on the body but will be on only one side of the body, the left or right. It will be in a band, a strip, or a small area. The pain can be very severe. Shingles can also cause tingling or itching in the area of the rash. The blisters scab over after a few days and heal in 2 to 4 weeks. Medicines can help you feel better and may help prevent more serious problems caused by shingles.     - valACYclovir (VALTREX) 1000 mg tablet; Take 1 tablet (1,000 mg) by mouth 3 times daily for 7 days    Left ear pain    Due to wax  Resolved after wax removal    Impacted cerumen of left ear    PROCEDURE:    Ear was evaluated and found to have impacted wax  Ear irrigation was performed and was was removed  Patient tolerated procedure well  May use OTC debrox drops prn in the future      Impacted earwax is a buildup of the natural wax in the ear. Tiny glands in your ear make substances that combine with dead skin cells to form earwax. Earwax helps protect your ear canal from water, dirt, infection, and injury. Over time,  earwax travels from the inner part of your ear canal to the entrance of the canal. Then it falls away naturally. But in some cases, it can t travel to the entrance of the canal. This may be because of a health condition or objects put in the ear. With age, earwax tends to become harder and less fluid.  Placing objects in ear like ear buds or using q-tips can cause wax to build up in the ears.       Review of external notes as documented elsewhere in note      At today's visit with Carol Barkley , we discussed results, diagnosis, medications and formulated a plan.  We also discussed red flags for immediate return to clinic/ER, as well as indications for follow up with PCP if not improved in 3 days. Patient understood and agreed to plan. Carol Barkley was discharged with stable vitals and has no further questions.       No follow-ups on file.    Subjective   Carol is a 69 year old, presenting for the following health issues:  Shingles (Shingles couple days left ear blockage/)    HPI     Review of Systems  Constitutional, HEENT, cardiovascular, pulmonary, gi and gu systems are negative, except as otherwise noted.      Objective    /62   Pulse 72   Temp 97.9  F (36.6  C) (Temporal)   Resp 16   Wt 84.8 kg (187 lb)   LMP 11/30/2004   SpO2 100%   BMI 30.88 kg/m    Body mass index is 30.88 kg/m .  Physical Exam   GENERAL: alert and no distress  EYES: Eyes grossly normal to inspection, PERRL and conjunctivae and sclerae normal  HENT: normal cephalic/atraumatic, right ear: normal: no effusions, no erythema, normal landmarks, left ear: occluded with wax, nose and mouth without ulcers or lesions, oropharynx clear, and oral mucous membranes moist  NECK: no adenopathy, no asymmetry, masses, or scars  MS: no gross musculoskeletal defects noted, no edema  SKIN: positive for cold sores on lips. Pos for vesicular rash on left side cheek  NEURO: Normal strength and tone, mentation intact and speech normal  PSYCH:  mentation appears normal, affect normal/bright            Signed Electronically by: Sebastien Story, PASCUAL, PAFLORI

## 2024-06-18 NOTE — PROGRESS NOTES
AUDIOLOGY REPORT    SUBJECTIVE:  Carol Barkley is a 69 year old female who was seen on 6/26/2024 in the Audiology Clinic at the [facility] for audiologic evaluation, referred by Precious Akins DO.     The patient was previously seen for evaluation on 3/9/23.  At that time, she had normal hearing through 500 Hz, sloping to a mild to moderately severe sensorineural hearing loss at 1000 Hz and above bilaterally.    The patient reports difficulty hearing in groups.  Carol is unsure if there have been hearing changes, since last year's assessment.  About 1.5-2 months ago, she had a left ear cleaing in her primary care clinic. She has constant bilateral tinnitus, which she can ignore.  The patient denies ear pain, drainage from the ears, aural fullness, dizziness, or history of ear surgery.  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  She now uses hearing protection.      OBJECTIVE:  Abuse Screening:  Do you feel unsafe at home or work/school? No  Do you feel threatened by someone? No  Does anyone try to keep you from having contact with others, or doing things outside of your home? No  Physical signs of abuse present? No     Fall Risk Screen:  1. Have you fallen two or more times in the past year? No  2. Have you fallen and had an injury in the past year? No    Timed Up and Go Score (in seconds): not tested  Is patient a fall risk? No  Referral initiated: No  Fall Risk Assessment Completed by Audiology    Otoscopic exam indicates that both ears are clear of cerumen.      Pure Tone Thresholds assessed using conventional audiometry with good reliability from 250-8000 Hz bilaterally using insert earphones and circumaural headphones.     RIGHT: Normal hearing through 500 Hz, sloping to a mild to moderately severe sensorineural hearing loss at 750 Hz and above    LEFT:   Normal hearing  at 250 Hz, sloping to a mild to moderately severe sensorineural hearing loss at 500 Hz and above    Tympanogram:    RIGHT: normal eardrum mobility     LEFT:   normal eardrum mobility     Reflexes (reported by stimulus ear):   RIGHT: Ipsilateral is present at normal levels  RIGHT: Contralateral is present at normal levels  LEFT:   Ipsilateral is present at normal levels  LEFT:   Contralateral is present at normal levels      Speech Reception Threshold:    RIGHT: 30 dB HL    LEFT:   30 dB HL  Word Recognition Score:     RIGHT: 96% at 70 dB HL using NU-6 recorded word list.    LEFT:   96% at 70 dB HL using NU-6 recorded word list.      ASSESSMENT:   Sensorineural hearing loss bilaterally    Today s results were discussed with the patient in detail.     PLAN:    -Recheck hearing if changes.    -Hearing aid trial if patient interest/motivation.  If interest, patient should check with her insurance to see if she has a hearing aid benefit/discount and where it can be used.  Patient was given a hard copy of today's results that she could hand carry to another provider if needed.    -Provided handout on communication strategies.    -Continued consistent of hearing protection.   -Follow up with Dr. Akins.         The patient expressed understanding and agreement with this plan.    Claudia Mcclellan, CCC-A, [facility]  Licensed Audiologist  [place] #[id number]      Enclosure: audiogram    Cc Precious Akins DO

## 2024-06-26 ENCOUNTER — OFFICE VISIT (OUTPATIENT)
Dept: AUDIOLOGY | Facility: CLINIC | Age: 69
End: 2024-06-26
Attending: INTERNAL MEDICINE
Payer: COMMERCIAL

## 2024-06-26 DIAGNOSIS — H90.3 SENSORINEURAL HEARING LOSS (SNHL) OF BOTH EARS: Primary | ICD-10-CM

## 2024-06-26 DIAGNOSIS — H91.93 BILATERAL HEARING LOSS, UNSPECIFIED HEARING LOSS TYPE: ICD-10-CM

## 2024-06-26 PROCEDURE — 92557 COMPREHENSIVE HEARING TEST: CPT | Performed by: AUDIOLOGIST-HEARING AID FITTER

## 2024-06-26 PROCEDURE — 92550 TYMPANOMETRY & REFLEX THRESH: CPT | Performed by: AUDIOLOGIST-HEARING AID FITTER

## 2024-08-26 ENCOUNTER — OFFICE VISIT (OUTPATIENT)
Dept: FAMILY MEDICINE | Facility: CLINIC | Age: 69
End: 2024-08-26
Payer: COMMERCIAL

## 2024-08-26 VITALS
BODY MASS INDEX: 31.49 KG/M2 | HEART RATE: 67 BPM | SYSTOLIC BLOOD PRESSURE: 121 MMHG | DIASTOLIC BLOOD PRESSURE: 78 MMHG | OXYGEN SATURATION: 99 % | RESPIRATION RATE: 15 BRPM | HEIGHT: 65 IN | WEIGHT: 189 LBS | TEMPERATURE: 97.3 F

## 2024-08-26 DIAGNOSIS — Z86.19 HISTORY OF SHINGLES: ICD-10-CM

## 2024-08-26 DIAGNOSIS — B00.1 COLD SORE: ICD-10-CM

## 2024-08-26 DIAGNOSIS — M79.18 MYALGIA, LOWER LEG: Primary | ICD-10-CM

## 2024-08-26 DIAGNOSIS — E78.5 HYPERLIPIDEMIA LDL GOAL <100: ICD-10-CM

## 2024-08-26 DIAGNOSIS — R21 FACIAL RASH: ICD-10-CM

## 2024-08-26 LAB
ALBUMIN SERPL BCG-MCNC: 4.4 G/DL (ref 3.5–5.2)
ALP SERPL-CCNC: 66 U/L (ref 40–150)
ALT SERPL W P-5'-P-CCNC: 21 U/L (ref 0–50)
ANION GAP SERPL CALCULATED.3IONS-SCNC: 9 MMOL/L (ref 7–15)
AST SERPL W P-5'-P-CCNC: 23 U/L (ref 0–45)
BILIRUB SERPL-MCNC: 0.5 MG/DL
BUN SERPL-MCNC: 14.9 MG/DL (ref 8–23)
CALCIUM SERPL-MCNC: 9.3 MG/DL (ref 8.8–10.4)
CHLORIDE SERPL-SCNC: 104 MMOL/L (ref 98–107)
CK SERPL-CCNC: 88 U/L (ref 26–192)
CREAT SERPL-MCNC: 0.88 MG/DL (ref 0.51–0.95)
EGFRCR SERPLBLD CKD-EPI 2021: 70 ML/MIN/1.73M2
ERYTHROCYTE [DISTWIDTH] IN BLOOD BY AUTOMATED COUNT: 12.3 % (ref 10–15)
GLUCOSE SERPL-MCNC: 86 MG/DL (ref 70–99)
HCO3 SERPL-SCNC: 27 MMOL/L (ref 22–29)
HCT VFR BLD AUTO: 40.8 % (ref 35–47)
HGB BLD-MCNC: 13.1 G/DL (ref 11.7–15.7)
MCH RBC QN AUTO: 30.7 PG (ref 26.5–33)
MCHC RBC AUTO-ENTMCNC: 32.1 G/DL (ref 31.5–36.5)
MCV RBC AUTO: 96 FL (ref 78–100)
PLATELET # BLD AUTO: 219 10E3/UL (ref 150–450)
POTASSIUM SERPL-SCNC: 4.2 MMOL/L (ref 3.4–5.3)
PROT SERPL-MCNC: 7 G/DL (ref 6.4–8.3)
RBC # BLD AUTO: 4.27 10E6/UL (ref 3.8–5.2)
SODIUM SERPL-SCNC: 140 MMOL/L (ref 135–145)
WBC # BLD AUTO: 5.6 10E3/UL (ref 4–11)

## 2024-08-26 PROCEDURE — 85027 COMPLETE CBC AUTOMATED: CPT

## 2024-08-26 PROCEDURE — 82550 ASSAY OF CK (CPK): CPT

## 2024-08-26 PROCEDURE — 80053 COMPREHEN METABOLIC PANEL: CPT

## 2024-08-26 PROCEDURE — 36415 COLL VENOUS BLD VENIPUNCTURE: CPT

## 2024-08-26 PROCEDURE — G2211 COMPLEX E/M VISIT ADD ON: HCPCS

## 2024-08-26 PROCEDURE — 99215 OFFICE O/P EST HI 40 MIN: CPT

## 2024-08-26 RX ORDER — PRAVASTATIN SODIUM 80 MG/1
80 TABLET ORAL DAILY
Qty: 90 TABLET | Refills: 0 | Status: SHIPPED | OUTPATIENT
Start: 2024-08-26 | End: 2024-08-26

## 2024-08-26 RX ORDER — VALACYCLOVIR HYDROCHLORIDE 1 G/1
2000 TABLET, FILM COATED ORAL 2 TIMES DAILY
Qty: 4 TABLET | Refills: 0 | Status: SHIPPED | OUTPATIENT
Start: 2024-08-26 | End: 2024-08-26

## 2024-08-26 RX ORDER — PRAVASTATIN SODIUM 40 MG
40 TABLET ORAL DAILY
Qty: 90 TABLET | Refills: 0 | Status: SHIPPED | OUTPATIENT
Start: 2024-08-26

## 2024-08-26 RX ORDER — VALACYCLOVIR HYDROCHLORIDE 1 G/1
2000 TABLET, FILM COATED ORAL 2 TIMES DAILY
Qty: 4 TABLET | Refills: 0 | Status: SHIPPED | OUTPATIENT
Start: 2024-08-26

## 2024-08-26 ASSESSMENT — PAIN SCALES - GENERAL: PAINLEVEL: NO PAIN (0)

## 2024-08-26 NOTE — PROGRESS NOTES
Assessment & Plan     Myalgia, lower leg  Hyperlipidemia LDL goal <100  Upper lateral leg and hip myalgias especially with activities limiting her abilities to exercise including cross country skiing which she hopes to participate in this winter. Will check CMP to ensure no electrolyte abnormalities or decrease in kidney function as well as CK today. Discussed briefly with patient's PCP as well as patient and ultimately decided to have her stop the crestor for 2 weeks, see if myalgia symptoms improve and if they do, go ahead and switch to Pravastatin 40 mg daily and should have follow-up with PCP in 3 months to check cholesterol and repeat labs.  - CK total  - Comprehensive metabolic panel (BMP + Alb, Alk Phos, ALT, AST, Total. Bili, TP)  - CBC with platelets  - CK total  - Comprehensive metabolic panel (BMP + Alb, Alk Phos, ALT, AST, Total. Bili, TP)  - CBC with platelets  - pravastatin (PRAVACHOL) 40 MG tablet  Dispense: 90 tablet; Refill: 0    Cold sore  Sent in valtrex treatment as below to take for active cold sore.   - valACYclovir (VALTREX) 1000 mg tablet  Dispense: 4 tablet; Refill: 0    Facial rash  History of shingles  Papules throughout face today do not appear consistent with herpes zoster. Tretinoin cream that she uses at home does not seem to be helping. Discussed acne versus mild rosacea, offered dermatology referral which she would like to pursue. Discussed if the rash does start to appear more like shingles when she had it on her face back in April, should be re-evaluated.   - Adult Dermatology  Referral    45 minutes spent on the date of the encounter doing chart review, history and exam, documentation and further activities per the note.       Addi Medina is a 69 year old, presenting for the following health issues:  Med Check  (Med Check ) and Shingles (Shingles - Flare up - cold sores as well )        8/26/2024     8:06 AM   Additional Questions   Roomed by Tamie Hastings  "    History of Present Illness       Reason for visit:  I want to change cholresterol medicine because it is affecting my muscles and ability to exercise.   She is taking medications regularly.     #muscle aches   Cross country skies, and two years ago she was able to do this without any difficulty and now she has great trouble with stairs. Past 4-5 months has noticed these muscle aches/ pains and she can only attribute them to her crestor medication.     Feels the pain with activity in her lateral hip area and down into the legs. No low back pain.     Started crestor originally back in Dec 2022.    Denies dark tea colored urine or weakness    Does endorse some fatigue but has a lot going on in life that she thinks could be contributing, still working part time, watches [employer] 2 days a week, might have her children back living with her soon.       #facial rash/ acne  Uses tretinoin cream daily   Had shingles in April, feels like these lesions are lingering or has affected her skin on the right side of her face particularly   Has a cold sore on her lower right side of lip, uses blistex.         Review of Systems  Constitutional, HEENT, cardiovascular, pulmonary, gi and gu systems are negative, except as otherwise noted.      Objective    /78 (BP Location: Right arm, Patient Position: Sitting, Cuff Size: Adult Regular)   Pulse 67   Temp 97.3  F (36.3  C) (Temporal)   Resp 15   Ht 1.651 m (5' 5\")   Wt 85.7 kg (189 lb)   LMP 11/30/2004   SpO2 99%   BMI 31.45 kg/m    Body mass index is 31.45 kg/m .  Physical Exam   GENERAL: alert and no distress  RESP: lungs clear to auscultation - no rales, rhonchi or wheezes  CV: regular rate and rhythm, normal S1 S2, no S3 or S4, no murmur, click or rub  MS: no gross musculoskeletal defects noted  SKIN: pink toned papules present on face (right side more than left) that are non vesicular, non erythematous.   NEURO: Normal strength and tone, mentation intact and speech " normal  PSYCH: mentation appears normal, affect normal/bright          Signed Electronically by: DORIS Parikh CNP

## 2024-08-28 NOTE — PROGRESS NOTES
Virtual Visit Details    Type of service:  Video Visit   Video Start Time:  8:55 am  Video End Time: 9:46 am    Originating Location (pt. Location): Home  Distant Location (provider location):  Off-site  Platform used for Video Visit: Jesus Alberto    8/29/2024    Referring Provider: Precious Akins DO    Presenting Information:   I spoke with Carol Barkley over video today for genetic counseling to discuss her family history of cancer. This appointment was conducted virtually due to COVID-19 precautions. We talked today to review this history, cancer screening recommendations, and available genetic testing options.    Personal History:  Carol is a 69 year old female. She does not have any personal history of cancer.     She had her first menstrual period at age 11, her first child at age 30, and is postmenopausal (around age 47 or 48). Carol had a hysterectomy and bilateral salpingo-oophorectomy on 5/17/11. She reports that she has not used hormone replacement therapy. She has used oral contraceptives. She has clinical breast exams and mammograms; her most recent mammogram on 10/30/23 was negative. She reports that she had a breast biopsy in the early 2000s that showed atypical hyperplasia. These records are not available for review today, but Carol reports that it was done at [facility]. A request will be sent to try to obtain these records. She reports that she also had one other breast excision about 1 year after this. Her most recent colonoscopy on 2/17/2023 detected no polyps. Previous colonoscopies include: 1/8/14 (no polyps), 12/9/08 (no polyps). She reports one other colonoscopy which identified 3 polyps, 1 of which was a precancerous polyp (records not available today). Carol reported no history of tobacco use and rare alcohol use. She reports some possible exposures due to [place].    Family History: (Please see scanned pedigree for detailed family history  information)  Siblings:  Her sister is 72 years old with no known history of cancer. She has had 10-19 colon polyps and has frequent colonoscopies.  Her other sister is 67 years old and was diagnosed with lung cancer at age 66. She two surgeries to remove part of her lung. She has no known history of smoking.  Maternal:  Her mother was diagnosed with inflammatory breast cancer at age 60. She was in remission for 10 years and then this recurred. She passed away at age 70. She did have a history of hormone replacement therapy use.  Her uncle was diagnosed with colon cancer at age 30 and then with stomach cancer at age 35. He passed away at age 40.  Her aunt was diagnosed with colon cancer at age 40 and with uterine cancer at age 80. She passed away at 87.  Her daughter (Anays cousin) was diagnosed with ovarian cancer at age 45 and passed away at age 52. She reportedly had negative genetic testing via the CancerNext Panel at [facility] in 2015 (32 genes analyzed). A copy of her report was not available today.  Another aunt passed away around age 89 with no known history of cancer.   Her daughter (Carol's cousin) is 68 years old and was diagnosed with breast cancer at age 66.  Her last aunt passed away at age 89 with no known history of cancer.   Her son (Anays cousin) has had 2-5 colon polyps.  Her grandmother passed away at age 89. She had a history of skin cancer.   Her sister (Anays great-aunt) had breast cancer and passed away at age 59.  Her niece (daughter of a different great-aunt/uncle) had breast cancer and passed away at age 83.  Her grandfather passed away at an older age (Carol thinks in approximately his 70s) with no known history of cancer.   His sister (Anays great-aunt) had breast cancer and passed away at age 62.  His niece (daughter of a different great-aunt/uncle) had breast cancer and passed away at age 61. Her sister had liver cancer and passed away at age 73. Her brother had brain  cancer and passed away at age 69.  Another niece (daughter of a different great-aunt/uncle) had breast cancer and passed away at age 91.  Another niece (daughter of a different great-aunt/uncle) had pancreatic cancer and passed away at age 93.  Another niece (daughter of a different great-aunt/uncle) had breast cancer and passed away at age 84.  Paternal:  Her father passed away at age 85 with no known history of cancer.   Her uncle had bone cancer at age 60 and had his leg removed. He passed away at age 90.    Her maternal ethnicity is [place]. Her paternal ethnicity is [place]. There is no known Ashkenazi Tenriism ancestry on either side of her family.     Discussion:  Carol's family history of cancer is suggestive of a hereditary cancer syndrome.  We reviewed the features of sporadic, familial, and hereditary cancers. In looking at Carol's family history, it is possible that a cancer susceptibility gene is present due to her maternal family history of breast, colon, stomach, uterine, pancreatic, and other cancers. She also has a maternal family history of ovarian cancer, although she reported that this cousin did have negative genetic testing. We discussed that there are enough individuals in the family who had cancer and did not have any genetic testing that it is still possible that there is a hereditary cancer syndrome in the family.   We discussed the natural history and genetics of hereditary cancer. Based on her family history of breast and pancreatic cancer, we discussed genes in which mutations are associated with an increased risk for these cancers, such as the BRCA1 and BRCA2 genes. Mutations in these genes cause a condition known as Hereditary Breast and Ovarian Cancer syndrome (HBOC). Women with a mutation in either of these genes are at increased risk for breast and ovarian cancer. There is also an increased risk for a second primary breast cancer. Men with a mutation in either of these genes are  at increased risk for breast and prostate cancer. Both women and men may also be at increased risk for pancreatic cancer and melanoma.   Additionally, based on her family history of colon, stomach, and uterine cancer, we discussed Palacios syndrome. Palacios syndrome can be caused by a mutation in one of five genes: MLH1, MSH2, MSH6, PMS2, and EPCAM. The highest cancer risks associated with Palacios syndrome include colon cancer, endometrial/uterine cancer, gastric cancer, and ovarian cancer. Other cancers have also been reported with Palacios syndrome, such as urinary tract, pancreas, bile duct, and others.    A detailed handout regarding these genes and other genes in which mutations are associated with an increased risk for breast cancer will be provided to Carol via [employer] and can be found in the after visit summary. Topics included: inheritance pattern, cancer risks, cancer screening recommendations, and also risks, benefits and limitations of testing.  Based on her personal and family history, Carol meets current National Comprehensive Cancer Network (NCCN) criteria for genetic testing of high-penetrance breast cancer susceptibility genes, as well as criteria for genetic testing of the Palacios syndrome genes.   We discussed that there are additional genes that could cause increased risk for breast, colon, stomach, uterine, pancreatic, and other cancers. As many of these genes present with overlapping features in a family and accurate cancer risk cannot always be established based upon the pedigree analysis alone, it would be reasonable for Carol to consider panel genetic testing to analyze multiple genes at once.  We reviewed genetic testing options for Carol based on her personal and family history: a panel of genes associated with an increased risk for certain cancers, or larger panel options to include genes associated with increased risk for multiple different cancer types.   Medical Management: For Carol, we reviewed  that the information from genetic testing may determine:  additional cancer screening for which Carol may qualify (i.e. mammogram and breast MRI, more frequent colonoscopies, more frequent dermatologic exams, etc.),  options for risk reducing surgeries Carol could consider (i.e. bilateral mastectomy, etc.),    and targeted chemotherapies if she were to develop certain cancers in the future (i.e. immunotherapy for individuals with Palacios syndrome, PARP inhibitors, etc.).   At the end of our discussion today, Carol elected to take some additional time to think about the option of genetic testing and implications for her and her family members.   If she decides not to have genetic testing, she should continue with her cancer screenings based on her personal and family history:  We spent some time discussing Carol's estimated breast cancer risk based on her personal and family history. As noted above, she reports that she had a breast biopsy in the early 2000s which showed atypical hyperplasia, although these records were not available for review today. We discussed that this information could significantly change her estimated breast cancer risk. If we estimate her breast cancer risk using the WELLINGTON Risk Evaluation v8 model without adding a previous breast biopsy with atypia, then Carol has an estimated 12.4% lifetime risk of developing breast cancer. Therefore, Carol would not meet current National Comprehensive Cancer Network (NCCN) guidelines for high risk breast screening, which is offered to women with a 20% lifetime risk or higher. However, if we run the same risk model and add that she had a previous breast biopsy with atypical hyperplasia, then Carol has an estimated 25.4% lifetime risk of developing breast cancer. As such, Carol would meet current NCCN guidelines for high risk breast screening. This includes annual breast MRI in addition to annual mammogram, alternating every 6 months. As noted above, I will see  if we can get her previous breast biopsy records to help clarify her risk. Carol stated that she will further discuss her breast screening with her PCP.     Other population cancer screening options, such as those recommended by the American Cancer Society and the National Comprehensive Cancer Network (NCCN), are also appropriate for Carol and her family. Final screening recommendations should be made in consultation with each individual's primary care provider.     Screening recommendations may change if there are changes in her personal or family history, or if there is a gene mutation detected in Carol or her family members.     Plan:  1) No genetic testing was ordered today. Carol elected to take some additional time to think about the option of genetic testing and implications for her and her family members.  2) She was encouraged to contact me with any additional questions, or if she decides that she would like to proceed with genetic testing.  3) If she decides not to have genetic testing, she should continue with her cancer screenings based on her personal and family history as outlined above.    Sumi aDvila MS, [facility]  Licensed, Certified Genetic Counselor  Office: [phone]  Email: [email or web address]

## 2024-08-29 ENCOUNTER — VIRTUAL VISIT (OUTPATIENT)
Dept: ONCOLOGY | Facility: CLINIC | Age: 69
End: 2024-08-29
Attending: INTERNAL MEDICINE
Payer: COMMERCIAL

## 2024-08-29 ENCOUNTER — PRE VISIT (OUTPATIENT)
Dept: ONCOLOGY | Facility: CLINIC | Age: 69
End: 2024-08-29

## 2024-08-29 DIAGNOSIS — Z12.39 BREAST CANCER SCREENING, HIGH RISK PATIENT: ICD-10-CM

## 2024-08-29 DIAGNOSIS — Z80.49 FAMILY HISTORY OF UTERINE CANCER: ICD-10-CM

## 2024-08-29 DIAGNOSIS — Z80.0 FAMILY HISTORY OF PANCREATIC CANCER: ICD-10-CM

## 2024-08-29 DIAGNOSIS — Z80.3 FAMILY HISTORY OF MALIGNANT NEOPLASM OF BREAST: Primary | ICD-10-CM

## 2024-08-29 DIAGNOSIS — Z80.41 FAMILY HISTORY OF MALIGNANT NEOPLASM OF OVARY: ICD-10-CM

## 2024-08-29 DIAGNOSIS — Z80.0 FAMILY HISTORY OF STOMACH CANCER: ICD-10-CM

## 2024-08-29 DIAGNOSIS — Z80.0 FAMILY HISTORY OF COLON CANCER: ICD-10-CM

## 2024-08-29 PROCEDURE — 96040 HC GENETIC COUNSELING, EACH 30 MINUTES: CPT | Mod: GT,95 | Performed by: GENETIC COUNSELOR, MS

## 2024-08-29 NOTE — NURSING NOTE
Current patient location:  [place], [place]      Is the patient currently in the state of [place]? YES    Visit mode:VIDEO    If the visit is dropped, the patient can be reconnected by: VIDEO VISIT: Send to e-mail at: [email or web address]    Will anyone else be joining the visit? NO  (If patient encounters technical issues they should call [phone]    How would you like to obtain your AVS? MyChart    Are changes needed to the allergy or medication list? N/A    Reason for visit: Consult      Maya PERALTA

## 2024-08-29 NOTE — LETTER
8/29/2024      Carol Barkley  [address]  [facility] [address]      Dear Colleague,    Thank you for referring your patient, Carol Barkley, to the [facility]. Please see a copy of my visit note below.    Virtual Visit Details    Type of service:  Video Visit   Video Start Time:  8:55 am  Video End Time: 9:46 am    Originating Location (pt. Location): Home  Distant Location (provider location):  Off-site  Platform used for Video Visit: Jesus Alberto    8/29/2024    Referring Provider: Precious Akins DO    Presenting Information:   I spoke with Carol Barkley over video today for genetic counseling to discuss her family history of cancer. This appointment was conducted virtually due to COVID-19 precautions. We talked today to review this history, cancer screening recommendations, and available genetic testing options.    Personal History:  Carol is a 69 year old female. She does not have any personal history of cancer.     She had her first menstrual period at age 11, her first child at age 30, and is postmenopausal (around age 47 or 48). Carol had a hysterectomy and bilateral salpingo-oophorectomy on 5/17/11. She reports that she has not used hormone replacement therapy. She has used oral contraceptives. She has clinical breast exams and mammograms; her most recent mammogram on 10/30/23 was negative. She reports that she had a breast biopsy in the early 2000s that showed atypical hyperplasia. These records are not available for review today, but Carol reports that it was done at [facility]. A request will be sent to try to obtain these records. She reports that she also had one other breast excision about 1 year after this. Her most recent colonoscopy on 2/17/2023 detected no polyps. Previous colonoscopies include: 1/8/14 (no polyps), 12/9/08 (no polyps). She reports one other colonoscopy which identified 3 polyps, 1 of which was a precancerous polyp (records not available  today). Carol reported no history of tobacco use and rare alcohol use. She reports some possible exposures due to [place].    Family History: (Please see scanned pedigree for detailed family history information)  Siblings:  Her sister is 72 years old with no known history of cancer. She has had 10-19 colon polyps and has frequent colonoscopies.  Her other sister is 67 years old and was diagnosed with lung cancer at age 66. She two surgeries to remove part of her lung. She has no known history of smoking.  Maternal:  Her mother was diagnosed with inflammatory breast cancer at age 60. She was in remission for 10 years and then this recurred. She passed away at age 70. She did have a history of hormone replacement therapy use.  Her uncle was diagnosed with colon cancer at age 30 and then with stomach cancer at age 35. He passed away at age 40.  Her aunt was diagnosed with colon cancer at age 40 and with uterine cancer at age 80. She passed away at 87.  Her daughter (Anays cousin) was diagnosed with ovarian cancer at age 45 and passed away at age 52. She reportedly had negative genetic testing via the CancerNext Panel at [facility] in 2015 (32 genes analyzed). A copy of her report was not available today.  Another aunt passed away around age 89 with no known history of cancer.   Her daughter (Carol's cousin) is 68 years old and was diagnosed with breast cancer at age 66.  Her last aunt passed away at age 89 with no known history of cancer.   Her son (Carol's cousin) has had 2-5 colon polyps.  Her grandmother passed away at age 89. She had a history of skin cancer.   Her sister (Anays great-aunt) had breast cancer and passed away at age 59.  Her niece (daughter of a different great-aunt/uncle) had breast cancer and passed away at age 83.  Her grandfather passed away at an older age (Carol thinks in approximately his 70s) with no known history of cancer.   His sister (Carol's great-aunt) had  breast cancer and passed away at age 62.  His niece (daughter of a different great-aunt/uncle) had breast cancer and passed away at age 61. Her sister had liver cancer and passed away at age 73. Her brother had brain cancer and passed away at age 69.  Another niece (daughter of a different great-aunt/uncle) had breast cancer and passed away at age 91.  Another niece (daughter of a different great-aunt/uncle) had pancreatic cancer and passed away at age 93.  Another niece (daughter of a different great-aunt/uncle) had breast cancer and passed away at age 84.  Paternal:  Her father passed away at age 85 with no known history of cancer.   Her uncle had bone cancer at age 60 and had his leg removed. He passed away at age 90.    Her maternal ethnicity is [place]. Her paternal ethnicity is [place]. There is no known Ashkenazi Zoroastrian ancestry on either side of her family.     Discussion:  Carol's family history of cancer is suggestive of a hereditary cancer syndrome.  We reviewed the features of sporadic, familial, and hereditary cancers. In looking at Carol's family history, it is possible that a cancer susceptibility gene is present due to her maternal family history of breast, colon, stomach, uterine, pancreatic, and other cancers. She also has a maternal family history of ovarian cancer, although she reported that this cousin did have negative genetic testing. We discussed that there are enough individuals in the family who had cancer and did not have any genetic testing that it is still possible that there is a hereditary cancer syndrome in the family.   We discussed the natural history and genetics of hereditary cancer. Based on her family history of breast and pancreatic cancer, we discussed genes in which mutations are associated with an increased risk for these cancers, such as the BRCA1 and BRCA2 genes. Mutations in these genes cause a condition known as Hereditary Breast and Ovarian Cancer syndrome (HBOC).  Women with a mutation in either of these genes are at increased risk for breast and ovarian cancer. There is also an increased risk for a second primary breast cancer. Men with a mutation in either of these genes are at increased risk for breast and prostate cancer. Both women and men may also be at increased risk for pancreatic cancer and melanoma.   Additionally, based on her family history of colon, stomach, and uterine cancer, we discussed Palacios syndrome. Palacios syndrome can be caused by a mutation in one of five genes: MLH1, MSH2, MSH6, PMS2, and EPCAM. The highest cancer risks associated with Palacios syndrome include colon cancer, endometrial/uterine cancer, gastric cancer, and ovarian cancer. Other cancers have also been reported with Palacios syndrome, such as urinary tract, pancreas, bile duct, and others.    A detailed handout regarding these genes and other genes in which mutations are associated with an increased risk for breast cancer will be provided to Carol via [employer] and can be found in the after visit summary. Topics included: inheritance pattern, cancer risks, cancer screening recommendations, and also risks, benefits and limitations of testing.  Based on her personal and family history, Carol meets current National Comprehensive Cancer Network (NCCN) criteria for genetic testing of high-penetrance breast cancer susceptibility genes, as well as criteria for genetic testing of the Palacios syndrome genes.   We discussed that there are additional genes that could cause increased risk for breast, colon, stomach, uterine, pancreatic, and other cancers. As many of these genes present with overlapping features in a family and accurate cancer risk cannot always be established based upon the pedigree analysis alone, it would be reasonable for Carol to consider panel genetic testing to analyze multiple genes at once.  We reviewed genetic testing options for Carol based on her personal and family history: a panel  of genes associated with an increased risk for certain cancers, or larger panel options to include genes associated with increased risk for multiple different cancer types.   Medical Management: For Carol, we reviewed that the information from genetic testing may determine:  additional cancer screening for which Carol may qualify (i.e. mammogram and breast MRI, more frequent colonoscopies, more frequent dermatologic exams, etc.),  options for risk reducing surgeries Carol could consider (i.e. bilateral mastectomy, etc.),    and targeted chemotherapies if she were to develop certain cancers in the future (i.e. immunotherapy for individuals with Palacios syndrome, PARP inhibitors, etc.).   At the end of our discussion today, Carol elected to take some additional time to think about the option of genetic testing and implications for her and her family members.   If she decides not to have genetic testing, she should continue with her cancer screenings based on her personal and family history:  We spent some time discussing Carol's estimated breast cancer risk based on her personal and family history. As noted above, she reports that she had a breast biopsy in the early 2000s which showed atypical hyperplasia, although these records were not available for review today. We discussed that this information could significantly change her estimated breast cancer risk. If we estimate her breast cancer risk using the WELLINGTON Risk Evaluation v8 model without adding a previous breast biopsy with atypia, then Carol has an estimated 12.4% lifetime risk of developing breast cancer. Therefore, Carol would not meet current National Comprehensive Cancer Network (NCCN) guidelines for high risk breast screening, which is offered to women with a 20% lifetime risk or higher. However, if we run the same risk model and add that she had a previous breast biopsy with atypical hyperplasia, then Carol has an estimated 25.4% lifetime risk of  developing breast cancer. As such, Carol would meet current NCCN guidelines for high risk breast screening. This includes annual breast MRI in addition to annual mammogram, alternating every 6 months. As noted above, I will see if we can get her previous breast biopsy records to help clarify her risk. Carol stated that she will further discuss her breast screening with her PCP.     Other population cancer screening options, such as those recommended by the American Cancer Society and the National Comprehensive Cancer Network (NCCN), are also appropriate for Carol and her family. Final screening recommendations should be made in consultation with each individual's primary care provider.     Screening recommendations may change if there are changes in her personal or family history, or if there is a gene mutation detected in Carol or her family members.     Plan:  1) No genetic testing was ordered today. Carol elected to take some additional time to think about the option of genetic testing and implications for her and her family members.  2) She was encouraged to contact me with any additional questions, or if she decides that she would like to proceed with genetic testing.  3) If she decides not to have genetic testing, she should continue with her cancer screenings based on her personal and family history as outlined above.    Sumi Davila MS, [facility] [place]  Licensed, Certified Genetic Counselor  Office: [phone]  Email: [email or web address]       Again, thank you for allowing me to participate in the care of your patient.        Sincerely,        Sumi Davila GC

## 2024-09-05 NOTE — TELEPHONE ENCOUNTER
Action    Action Taken 9/5/24  Faxed request for historical [facility] Breast Biopsy reports from Early 2000s  9:24 AM

## 2024-09-07 NOTE — PATIENT INSTRUCTIONS
Assessing Cancer Risk  Cancer is a common diagnosis which impacts many families.  Individuals may develop cancer due to environmental factors (such as exposures and lifestyle), aging, genetic predisposition, or a combination of these factors.      Only about 5-10% of cancers are thought to be due to an inherited cancer susceptibility gene.    These families often have:  Several people with the same or related types of cancer  Cancers diagnosed at a young age (before age 50)  Individuals with more than one primary cancer  Multiple generations of the family affected with cancer    Comprehensive Breast and Gynecologic Cancer Panel  We each inherit two copies of every gene in our bodies: one from our mother, and one from our father. Each gene has a specific job to do.  When a gene has a mistake or  mutation  in it, it does not work like it should.     Some people may be candidates for genetic testing of more than one gene.  For these families, genetic testing using a cancer panel may be offered. These panels will test different genes at once known to increase the risk for breast, ovarian, uterine, and/or other cancers.    This handout will review common hereditary breast and gynecologic cancer syndromes. The genes that will be discussed in this handout are: DREW, BRCA1, BRCA2, BRIP1, CDH1, CHEK2, MLH1, MSH2, MSH6, PMS2, EPCAM, PTEN, PALB2, RAD51C, RAD51D, and TP53.    The purpose of this handout is to serve as a brief summary of the breast and gynecologic cancer risk genes that have published clinical management guidelines for individuals who are found to carry a mutation. Inheriting a mutation does not mean a person will develop cancer, but it does significantly increase their risk above the general population risk.     ______________________________________________________________________________    Hereditary Breast and Ovarian Cancer Syndrome (BRCA1 and BRCA2)  A single mutation in one of the copies of BRCA1 or  BRCA2 increases the risk for breast and ovarian cancer, among others.  The risk for pancreatic cancer and melanoma may also be slightly increased in some families.  The chart below shows the chance that someone with a BRCA mutation would develop cancer in his or her lifetime1,2,3,4.       Lifetime Cancer Risks    General Population BRCA1  BRCA2   Breast  12% >60% >60%   Ovarian  1-2% 39-58% 13-29%   Prostate 12% 7-26% 19-61%   Male Breast 0.1% 0.2-1.2% 1.8-7.1%   Pancreas 1-2% Up to 5% 5-10%     A person s ethnic background is also important to consider, as individuals of Ashkenazi Sikhism ancestry have a higher chance of having a BRCA gene mutation.  There are three BRCA mutations that occur more frequently in this population.      Palacios Syndrome (MLH1, MSH2, MSH6, PMS2, and EPCAM)  Currently five genes are known to cause Palacios Syndrome: MLH1, MSH2, MSH6, PMS2, and EPCAM.  A single mutation in one of the Palacios Syndrome genes increases the risk for colon, endometrial, ovarian, and stomach cancers.  Other cancers that occur less commonly in Palacios Syndrome include urinary tract, skin, and brain cancers.  The chart below shows the chance that a person with Palacios syndrome would develop cancer in his or her lifetime5.      Lifetime Cancer Risks    General Population Palacios Syndrome   Colon 5% 10-61%   Endometrial 3% 13-57%   Ovarian 1-2% 1-38%   Stomach <1% 1-9%   *Cancer risk varies depending on Palacios syndrome gene found      Cowden Syndrome (PTEN)  Cowden syndrome is a hereditary condition that increases the risk for breast, thyroid, endometrial, colon, and kidney cancer.  Cowden syndrome is caused by a mutation in the PTEN gene.  A single mutation in one of the copies of PTEN causes Cowden syndrome and increases cancer risk.  The chart below shows the chance that someone with a PTEN mutation would develop cancer in their lifetime6,7.  Other benign features seen in some individuals with Cowden syndrome include benign  skin lesions (facial papules, keratoses, lipomas), learning disability, autism, thyroid nodules, colon polyps, and larger head size.     Lifetime Cancer Risks    General Population Cowden   Breast 12% 40-60%*   Thyroid 1% Up to 38%   Renal 1-2% Up to 35%   Endometrial 3% Up to 28%   Colon 5% Up to 9%   Melanoma 2-3% Up to 6%   *Emerging data suggests the risk for breast cancer could be greater than 60%               Li-Fraumeni Syndrome (TP53)  Li-Fraumeni Syndrome (LFS) is a cancer predisposition syndrome caused by a mutation in the TP53 gene. A single mutation in one of the copies of TP53 increases the risk for multiple cancers. Individuals with LFS are at an increased risk for developing cancer at a young age. The lifetime risk for development of a LFS-associated cancer is 50% by age 30 and 90% by age 60.   Core Cancers: Sarcomas, Breast, Brain, Lung, Leukemias/Lymphomas, Adrenocortical carcinomas  Other Cancers: Gastrointestinal, Thyroid, Skin, Genitourinary       Hereditary Diffuse Gastric Cancer (CDH1)  Currently, one gene is known to cause hereditary diffuse gastric cancer (HDGC): CDH1.  Individuals with HDGC are at increased risk for diffuse gastric cancer and lobular breast cancer. Of people diagnosed with HDGC, 30-50% have a mutation in the CDH1 gene.  This suggests there are likely other genes that may cause HDGC that have not been identified yet.      Lifetime Cancer Risks    General Population HDGC   Diffuse Gastric  <1% ~80%   Breast 12% 41-60%       Additional Genes    DREW  DREW is a moderate-risk breast cancer gene. Women who have a mutation in DREW can have between a 2-4 fold increased risk for breast cancer compared to the general population8. DREW mutations have also been associated with increased risk for pancreatic cancer between 5-10%9. Individuals who inherit two DREW mutations have a condition called ataxia-telangiectasia (AT).  This rare autosomal recessive condition affects the nervous system  and immune system, and is associated with progressive cerebellar ataxia beginning in childhood. Individuals with ataxia-telangiectasia often have a weakened immune system and have an increased risk for childhood cancers.    PALB2  Mutations in PALB2 have been shown to increase the risk of breast cancer up to 41-60% in some families; where individuals fall within this risk range is dependent upon family [id number]. PALB2 mutations have also been associated with increased risk for pancreatic cancer between 5-10%.  Individuals who inherit two PALB2 mutations--one from their mother and one from their father--have a condition called Fanconi Anemia.  This rare autosomal recessive condition is associated with short stature, developmental delay, bone marrow failure, and increased risk for childhood cancers.    CHEK2   CHEK2 is a moderate-risk breast cancer gene.  Women who have a mutation in CHEK2 have around a 2-4 fold increased risk for breast cancer compared to the general population, and this risk may be higher depending upon family history.11,12,13 The risk of colon cancer may be twice as high as the general population risk of colon cancer of 5%. Mutations in CHEK2 have also been shown to increase the risk of other cancers, including prostate, however these cancer risks are currently not well understood.    BRIP1, RAD51C and RAD51D  Mutations in RAD51C and RAD51D have been shown to increase the risk of ovarian cancer and breast cancer 14,. Mutations in BRIP1 have been shown to increase the risk of ovarian cancer and possibly female breast cancer 15 .       Lifetime Cancer Risk    General Population        BRIP1   RAD51C  RAD51D   Breast 12% Not well defined 20-40% 20-40%   Ovarian 1-2% 5-15% 10-15% 10-20%     ______________________________________________________________  Inheritance  All of the cancer syndromes reviewed above are inherited in an autosomal dominant pattern.  This means that if a parent has a mutation,  each of their children will have a 50% chance of inheriting that same mutation. Therefore, each child --male or female-- would have a 50% chance of being at increased risk for developing cancer.    Image obtained from Genetics Home Reference, 2013     Mutations in some genes can occur de mohit, which means that a person s mutation occurred for the first time in them and was not inherited from a parent.  Now that they have the mutation, however, it can be passed on to future generations.    Genetic Testing  Genetic testing involves a blood test and will look for any harmful mutations that are associated with increased cancer risk.  If possible, it is recommended that the person(s) who has had cancer be tested before other family members.  That person will give us the most useful information about whether or not a specific gene is associated with the cancer in the family.    Results  There are three possible results of genetic testing:  Positive--a harmful mutation was identified in one or more of the genes  Negative--no mutations were identified in any of the genes tested  Variant of unknown significance--a variation in one of the genes was identified, but it is unclear how this impacts cancer risk in the family    Advantages and Disadvantages   There are advantages and disadvantages to genetic testing.    Advantages  May clarify your cancer risk  Can help you make medical decisions  May explain the cancers in your family  May give useful information to your family members (if you share your results)    Disadvantages  Possible negative emotional impact of learning about inherited cancer risk  Uncertainty in interpreting a negative test result in some situations  Possible genetic discrimination concerns (see below)    Genetic Information Nondiscrimination Act (MALU)  The Genetic Information Nondiscrimination Act of 2008 (MALU) is a federal law that protects individuals from health insurance or employment discrimination  based on a genetic test result alone (with some exceptions, including employers with fewer than 15 employees, and ).  Although rare, MALU  does not cover discrimination protections in terms of life insurance, long term care, or disability insurances.  Visit the National Human viaForensics Research [place] website to learn more.    Reducing Cancer Risk  All of the genes described in this handout have nationally recognized cancer screening guidelines that would be recommended for individuals who test positive.  In addition to increased cancer screening, surgeries may be offered or recommended to reduce cancer risk.  Recommendations are based upon an individual s genetic test result as well as their personal and family history of cancer.    Questions to Think About Regarding Genetic Testing:  What effect will the test result have on me and my relationship with my family members if I have an inherited gene mutation?  If I don t have a gene mutation?  Should I share my test results, and how will my family react to this news, which may also affect them?  Are my children ready to learn new information that may one day affect their own health?    Hereditary Cancer Resources    FORCE: Facing Our Risk of Cancer Empowered facingourrisk.org   Bright Pink bebrightpink.org   Li-Fraumeni Syndrome Association lfsassociation.org   PTEN World PTENworld.com   No stomach for cancer, Inc. nostomachforcancer.org   Stomach cancer relief network Scrnet.org   Collaborative Group of the Americas on Inherited Colorectal Cancer (CGA) cgaicc.com    Cancer Care cancercare.org   American Cancer Society (ACS) cancer.org   National Cancer [place] (NCI) cancer.gov     Please call us if you have any questions or concerns.   Cancer Risk Management Program [phone]-CANCER ([phone])  Jet Sotomayor, MS [facility] [place] [facility]  [phone]  Wanda Pedroza, MS, [facility] [phone]  Latricia Gates, MS, [facility]  [phone]  Mara Troy, MS, [facility]  [phone]  Sumi Davila,  MS, [facility]  [phone]  Tresa Alvarez, MS, [facility] [phone]  Ashlee Jay, MS, [facility] [phone]    References  Chris Alcantara PDP, Ryanne S, Jodie DURAN, Ramirez JE, Evgeny JL, Linda N, Inés H, Jennifer O, John A, Pasini B, Radimindy P, Manzehra S, Kourtney DM, Carpio N, Ben E, Luis Fernando H, Bee E, Constantino J, Gronlara J, Milly B, Tulinius H, Thorlacius S, Eerola H, Nevanlinna H, Cassie K, Guanako OP. Average risks of breast and ovarian cancer associated with BRCA1 or BRCA2 mutations detected in case series unselected for family history: a combined analysis of 222 studies. Am J Hum Jennie. 2003;72:1117-30.  Dana N, Christi M, Janneth G.  BRCA1 and BRCA2 Hereditary Breast and Ovarian Cancer. Gene Reviews online. 2013.  Kleber YC, Rachana S, Selvin G, Walker S. Breast cancer risk among male BRCA1 and BRCA2 mutation carriers. J Natl Cancer Inst. 2007;99:1811-4.  Raheel PALACIO, Bette I, Mahesh J, Alcon E, Kole ER, Jenny F. Risk of breast cancer in male BRCA2 carriers. J Med Jennie. 2010;47:710-1.  National Comprehensive Cancer Network. Clinical practice guidelines in oncology, colorectal cancer screening. Available online (registration required). 2015.  Dylan MH, Anita J, Yao J, Sarah LEONARDO, Deepti MS, Eng C. Lifetime cancer risks in individuals with germline PTEN mutations. Clin Cancer Res. 2012;18:400-7.  Marly R. Cowden Syndrome: A Critical Review of the Clinical Literature. J Jennie . 2009:18:13-27.  Guillermo DANIELS, Piyush CHADIWCK, Armaan S, Ella P, Sher T, Leatha M, Abram B, Smitha H, Reyes R, Stephan K, John L, Raheel PALACIO, Kourtney CHADWICK, Bebeto DF, Cari MR, The Breast Cancer Susceptibility Collaboration (UK) & Jf PETERSON. DREW mutations that cause ataxia-telangiectasia are breast cancer susceptibility alleles. Nature Genetics. 2006;38:873-875  Sixto N , Latasha Y, Candy J, Loi L, Jerri ALTAMIRANO , Bernadette ML, Alexi S, Merna AG, Davi S, Mora ML, Sudhakar J , Zheng R, Rafael SINCLAIR, Tricia  JR, Dallas VE, Amada M, Vokangstein B, Eulogio N, Alex RH, Tessy KW, and Marcy AP. DREW mutations in patients with hereditary pancreatic cancer. Cancer Discover. 2012;2:41-46  Allen CABELLO., et al. Breast-Cancer Risk in Families with Mutations in PALB2. NEJM. 2014; 371(6):497-506.  CHEK2 Breast Cancer Case-Control Consortium. CHEK2*1100delC and susceptibility to breast cancer: A collaborative analysis involving 10,860 breast cancer cases and 9,065 controls from 10 studies. Am J Hum Jennie, 74 (2004), pp. 9252-7319  Kelsie T, Taylor S, Vivek K, et al. Spectrum of Mutations in BRCA1, BRCA2, CHEK2, and TP53 in Families at High Risk of Breast Cancer. ROSLYN. 2006;295(12):2885-8296.   Naeem C, Branden D, Irasema DANIELS, et al. Risk of breast cancer in women with a CHEK2 mutation with and without a family history of breast cancer. J Clin Oncol. 2011;29:6591-7127.  Song H, Harshs E, Ramus SJ, et al. Contribution of germline mutations in the RAD51B, RAD51C, and RAD51D genes to ovarian cancer in the population. J Clin Oncol. 2015;33(26):4690-0625. Doi:10.1200/JCO.2015.61.2408.  Suresh T, Mike DF, Lilo P, et al. Mutations in BRIP1 confer high risk of ovarian cancer. Ingrid Jennie. 2011;43(11):6534-7896. doi:10.1038/ng.955.

## 2024-10-28 ENCOUNTER — ANCILLARY PROCEDURE (OUTPATIENT)
Dept: BONE DENSITY | Facility: CLINIC | Age: 69
End: 2024-10-28
Attending: INTERNAL MEDICINE
Payer: COMMERCIAL

## 2024-10-28 DIAGNOSIS — Z78.0 ENCOUNTER FOR OSTEOPOROSIS SCREENING IN ASYMPTOMATIC POSTMENOPAUSAL PATIENT: ICD-10-CM

## 2024-10-28 DIAGNOSIS — Z13.820 ENCOUNTER FOR OSTEOPOROSIS SCREENING IN ASYMPTOMATIC POSTMENOPAUSAL PATIENT: ICD-10-CM

## 2024-10-28 PROCEDURE — 77080 DXA BONE DENSITY AXIAL: CPT | Performed by: INTERNAL MEDICINE

## 2024-11-07 ENCOUNTER — ANCILLARY PROCEDURE (OUTPATIENT)
Dept: MAMMOGRAPHY | Facility: CLINIC | Age: 69
End: 2024-11-07
Attending: INTERNAL MEDICINE
Payer: COMMERCIAL

## 2024-11-07 DIAGNOSIS — Z12.31 VISIT FOR SCREENING MAMMOGRAM: ICD-10-CM

## 2024-11-07 PROCEDURE — 77063 BREAST TOMOSYNTHESIS BI: CPT | Performed by: RADIOLOGY

## 2024-11-07 PROCEDURE — 77067 SCR MAMMO BI INCL CAD: CPT | Performed by: RADIOLOGY

## 2024-11-15 DIAGNOSIS — E78.5 HYPERLIPIDEMIA LDL GOAL <100: ICD-10-CM

## 2024-11-15 RX ORDER — PRAVASTATIN SODIUM 40 MG
40 TABLET ORAL DAILY
Qty: 90 TABLET | Refills: 0 | Status: SHIPPED | OUTPATIENT
Start: 2024-11-15

## 2024-12-02 ENCOUNTER — OFFICE VISIT (OUTPATIENT)
Dept: FAMILY MEDICINE | Facility: CLINIC | Age: 69
End: 2024-12-02
Payer: COMMERCIAL

## 2024-12-02 VITALS
RESPIRATION RATE: 17 BRPM | DIASTOLIC BLOOD PRESSURE: 84 MMHG | TEMPERATURE: 97.2 F | SYSTOLIC BLOOD PRESSURE: 135 MMHG | HEART RATE: 80 BPM | OXYGEN SATURATION: 99 %

## 2024-12-02 DIAGNOSIS — M85.859 OSTEOPENIA OF HIP, UNSPECIFIED LATERALITY: Primary | ICD-10-CM

## 2024-12-02 DIAGNOSIS — R56.9 SEIZURE-LIKE ACTIVITY (H): ICD-10-CM

## 2024-12-02 DIAGNOSIS — M79.10 MYALGIA: ICD-10-CM

## 2024-12-02 LAB
ALBUMIN SERPL BCG-MCNC: 4.3 G/DL (ref 3.5–5.2)
ALP SERPL-CCNC: 76 U/L (ref 40–150)
ALT SERPL W P-5'-P-CCNC: 24 U/L (ref 0–50)
ANION GAP SERPL CALCULATED.3IONS-SCNC: 10 MMOL/L (ref 7–15)
AST SERPL W P-5'-P-CCNC: 27 U/L (ref 0–45)
BASOPHILS # BLD AUTO: 0 10E3/UL (ref 0–0.2)
BASOPHILS NFR BLD AUTO: 1 %
BILIRUB SERPL-MCNC: 0.5 MG/DL
BUN SERPL-MCNC: 13.9 MG/DL (ref 8–23)
CALCIUM SERPL-MCNC: 9.1 MG/DL (ref 8.8–10.4)
CHLORIDE SERPL-SCNC: 105 MMOL/L (ref 98–107)
CHOLEST SERPL-MCNC: 170 MG/DL
CK SERPL-CCNC: 71 U/L (ref 26–192)
CREAT SERPL-MCNC: 0.87 MG/DL (ref 0.51–0.95)
CRP SERPL-MCNC: 5.12 MG/L
EGFRCR SERPLBLD CKD-EPI 2021: 71 ML/MIN/1.73M2
EOSINOPHIL # BLD AUTO: 0.2 10E3/UL (ref 0–0.7)
EOSINOPHIL NFR BLD AUTO: 2 %
ERYTHROCYTE [DISTWIDTH] IN BLOOD BY AUTOMATED COUNT: 12.3 % (ref 10–15)
ERYTHROCYTE [SEDIMENTATION RATE] IN BLOOD BY WESTERGREN METHOD: 16 MM/HR (ref 0–30)
FASTING STATUS PATIENT QL REPORTED: NO
FASTING STATUS PATIENT QL REPORTED: NO
GLUCOSE SERPL-MCNC: 101 MG/DL (ref 70–99)
HCO3 SERPL-SCNC: 27 MMOL/L (ref 22–29)
HCT VFR BLD AUTO: 39.4 % (ref 35–47)
HDLC SERPL-MCNC: 55 MG/DL
HGB BLD-MCNC: 12.8 G/DL (ref 11.7–15.7)
IMM GRANULOCYTES # BLD: 0 10E3/UL
IMM GRANULOCYTES NFR BLD: 0 %
LDLC SERPL CALC-MCNC: 86 MG/DL
LYMPHOCYTES # BLD AUTO: 1.5 10E3/UL (ref 0.8–5.3)
LYMPHOCYTES NFR BLD AUTO: 18 %
MAGNESIUM SERPL-MCNC: 2.2 MG/DL (ref 1.7–2.3)
MCH RBC QN AUTO: 31.1 PG (ref 26.5–33)
MCHC RBC AUTO-ENTMCNC: 32.5 G/DL (ref 31.5–36.5)
MCV RBC AUTO: 96 FL (ref 78–100)
MONOCYTES # BLD AUTO: 0.8 10E3/UL (ref 0–1.3)
MONOCYTES NFR BLD AUTO: 10 %
NEUTROPHILS # BLD AUTO: 5.9 10E3/UL (ref 1.6–8.3)
NEUTROPHILS NFR BLD AUTO: 71 %
NONHDLC SERPL-MCNC: 115 MG/DL
PHOSPHATE SERPL-MCNC: 3.5 MG/DL (ref 2.5–4.5)
PLATELET # BLD AUTO: 245 10E3/UL (ref 150–450)
POTASSIUM SERPL-SCNC: 4.1 MMOL/L (ref 3.4–5.3)
PROT SERPL-MCNC: 6.8 G/DL (ref 6.4–8.3)
PTH-INTACT SERPL-MCNC: 39 PG/ML (ref 15–65)
RBC # BLD AUTO: 4.12 10E6/UL (ref 3.8–5.2)
SODIUM SERPL-SCNC: 142 MMOL/L (ref 135–145)
TRIGL SERPL-MCNC: 144 MG/DL
VIT D+METAB SERPL-MCNC: 31 NG/ML (ref 20–50)
WBC # BLD AUTO: 8.3 10E3/UL (ref 4–11)

## 2024-12-02 PROCEDURE — 85025 COMPLETE CBC W/AUTO DIFF WBC: CPT | Performed by: INTERNAL MEDICINE

## 2024-12-02 PROCEDURE — 82550 ASSAY OF CK (CPK): CPT | Performed by: INTERNAL MEDICINE

## 2024-12-02 PROCEDURE — 84100 ASSAY OF PHOSPHORUS: CPT | Performed by: INTERNAL MEDICINE

## 2024-12-02 PROCEDURE — 85652 RBC SED RATE AUTOMATED: CPT | Performed by: INTERNAL MEDICINE

## 2024-12-02 PROCEDURE — 83735 ASSAY OF MAGNESIUM: CPT | Performed by: INTERNAL MEDICINE

## 2024-12-02 PROCEDURE — 86140 C-REACTIVE PROTEIN: CPT | Performed by: INTERNAL MEDICINE

## 2024-12-02 PROCEDURE — 99214 OFFICE O/P EST MOD 30 MIN: CPT | Performed by: INTERNAL MEDICINE

## 2024-12-02 PROCEDURE — 80053 COMPREHEN METABOLIC PANEL: CPT | Performed by: INTERNAL MEDICINE

## 2024-12-02 PROCEDURE — 36415 COLL VENOUS BLD VENIPUNCTURE: CPT | Performed by: INTERNAL MEDICINE

## 2024-12-02 PROCEDURE — 80061 LIPID PANEL: CPT | Performed by: INTERNAL MEDICINE

## 2024-12-02 PROCEDURE — 82306 VITAMIN D 25 HYDROXY: CPT | Performed by: INTERNAL MEDICINE

## 2024-12-02 PROCEDURE — 83970 ASSAY OF PARATHORMONE: CPT | Performed by: INTERNAL MEDICINE

## 2024-12-02 ASSESSMENT — PAIN SCALES - GENERAL: PAINLEVEL_OUTOF10: NO PAIN (0)

## 2024-12-02 NOTE — PATIENT INSTRUCTIONS
Let's check a brain MRI to further evaluate the event that happened on the plane.    Please start cutting the pravastatin in half (20 mg daily) and let me know Friday if the muscle aches are better    Vitamin D: 2000 units daily    CALCIUM Recommendation 1500 mg/day in divided dose of no more than 500 mg/dose. This includes what is in your food and also what is in any supplements you are taking.       Dietary sources of calcium: These also contain vitamin D  Milk / buttermilk              8 oz            300 mg  Dry milk powder       5 Tbsp             300 mg  Yogurt                          1 cup           400 mg  Ice cream                    1/2 cup          100 mg  Hard cheese                     1.5 oz          300 mg  Cottage cheese                1/2 cup        65 mg  Spinach, cooked          1 cup              240 mg  Tofu, soft regular           4 oz          120 to 390 mg  Sardines                       3 oz               370 mg  Mackerel canned         3 oz                250 mg  Canned salmon with bones 3 oz        170-210 mg  Calcium fortified cereals are available  SILK soy and almond milk products are calcium fortified  Orange juice  Fortified with Calcium       8 oz            300 mg  Low fat dairy sources are recommended     Recommended exercise goals:  30 minutes of moderate exercise on most days of the week .  Weight bearing exercise (ie, walking, jogging, hiking, dancing)     Strength training 2 or more times/week in addition to other weight -being exercise.  Strength training -- uses weight or resistance beyond that seen in everyday activities -(pilates, weight training with free weights, weight machines or resistance bands)

## 2024-12-02 NOTE — PROGRESS NOTES
"  Assessment & Plan     (M85.859) Osteopenia of hip, unspecified laterality  (primary encounter diagnosis)  Comment: Check labs today; strength training, Vit D, Ca- recheck DEXA in 2 years.  Plan: Physical Therapy  Referral, CBC with         platelets and differential, Vitamin D         Deficiency, Parathyroid Hormone Intact,         Magnesium, Phosphorus    (M79.10) Myalgia  Comment: Since starting Crestor; improved after stopping but not resolved yet on pravastatin.  Cut pravastatin dose in half (20 mg daily) and monitor sx.  Plan: CK total, Lipid panel reflex to direct LDL         Non-fasting, Comprehensive metabolic panel (BMP        + Alb, Alk Phos, ALT, AST, Total. Bili, TP),         ESR: Erythrocyte sedimentation rate, CRP,         inflammation    (R56.9) Seizure-like activity (H)  Comment: Occurred on plane returning from [place] Sept 2024; has history of migraines, was dehydrated and tired; neuro exam normal but has perceived R visual field cut.  Plan: MR Brain w/o & w Contrast             BMI  Estimated body mass index is 31.45 kg/m  as calculated from the following:    Height as of 8/26/24: 1.651 m (5' 5\").    Weight as of 8/26/24: 85.7 kg (189 lb).         Patient Instructions   Let's check a brain MRI to further evaluate the event that happened on the plane.    Please start cutting the pravastatin in half (20 mg daily) and let me know Friday if the muscle aches are better    Vitamin D: 2000 units daily    CALCIUM Recommendation 1500 mg/day in divided dose of no more than 500 mg/dose. This includes what is in your food and also what is in any supplements you are taking.       Dietary sources of calcium: These also contain vitamin D  Milk / buttermilk              8 oz            300 mg  Dry milk powder       5 Tbsp             300 mg  Yogurt                          1 cup           400 mg  Ice cream                    1/2 cup          100 mg  Hard cheese                     1.5 oz          300 " mg  Cottage cheese                1/2 cup        65 mg  Spinach, cooked          1 cup              240 mg  Tofu, soft regular           4 oz          120 to 390 mg  Sardines                       3 oz               370 mg  Mackerel canned         3 oz                250 mg  Canned salmon with bones 3 oz        170-210 mg  Calcium fortified cereals are available  SILK soy and almond milk products are calcium fortified  Orange juice  Fortified with Calcium       8 oz            300 mg  Low fat dairy sources are recommended     Recommended exercise goals:  30 minutes of moderate exercise on most days of the week .  Weight bearing exercise (ie, walking, jogging, hiking, dancing)     Strength training 2 or more times/week in addition to other weight -being exercise.  Strength training -- uses weight or resistance beyond that seen in everyday activities -(pilates, weight training with free weights, weight machines or resistance bands)       Addi Medina is a 69 year old, presenting for the following health issues:  Follow Up        12/2/2024    10:40 AM   Additional Questions   Roomed by Tamie Hastings     History of Present Illness       Hyperlipidemia:  She presents for follow up of hyperlipidemia.   She is taking medication to lower cholesterol. She is having myalgia or other side effects to statin medications.      8/26/24- saw binta for muscle aches, stopped Crestor with plan to start pravastatin  I last saw 12/4/23- saw genetics counselor, still thinking about testing or not    About 80% back in terms of muscle aches.  Pain is primarily in the bilateral thighs. Feeling a little weak as well.    Was coming back from [place].  Had a seizure on the plane coming back- lost bladder control on the plane.   reports that both of her arms were flexed and rigidly shaking.  She was dehydrated and exhausted (had been awake for 18 hours).      Blood pressures have been looking good.  First thing in the morning,  feeling really lightheaded.  Doesn't take the medicine until after she's had breakfast.        Objective    /84 (BP Location: Right arm, Patient Position: Sitting, Cuff Size: Adult Regular)   Pulse 80   Temp 97.2  F (36.2  C) (Temporal)   Resp 17   LMP 11/30/2004   SpO2 99%   There is no height or weight on file to calculate BMI.  Physical Exam   GENERAL: alert and no distress  MS: no gross musculoskeletal defects noted, no edema  NEURO: Normal strength and tone, mentation intact and speech normal; patellar DTR normal; CNII-XII tested and intact.  Gait normal.  PSYCH: mentation appears normal, affect normal/bright            Signed Electronically by: Precious Akins,

## 2024-12-06 ENCOUNTER — ANCILLARY PROCEDURE (OUTPATIENT)
Dept: GENERAL RADIOLOGY | Facility: CLINIC | Age: 69
End: 2024-12-06
Attending: INTERNAL MEDICINE
Payer: COMMERCIAL

## 2024-12-06 DIAGNOSIS — R05.3 PERSISTENT COUGH: ICD-10-CM

## 2024-12-06 PROCEDURE — 71046 X-RAY EXAM CHEST 2 VIEWS: CPT | Mod: TC | Performed by: RADIOLOGY

## 2024-12-09 ENCOUNTER — PATIENT OUTREACH (OUTPATIENT)
Dept: CARE COORDINATION | Facility: CLINIC | Age: 69
End: 2024-12-09
Payer: COMMERCIAL

## 2024-12-11 ENCOUNTER — PATIENT OUTREACH (OUTPATIENT)
Dept: CARE COORDINATION | Facility: CLINIC | Age: 69
End: 2024-12-11
Payer: COMMERCIAL

## 2024-12-18 ENCOUNTER — HOSPITAL ENCOUNTER (OUTPATIENT)
Dept: MRI IMAGING | Facility: HOSPITAL | Age: 69
Discharge: HOME | End: 2024-12-18
Attending: INTERNAL MEDICINE
Payer: COMMERCIAL

## 2024-12-18 DIAGNOSIS — R56.9 SEIZURE-LIKE ACTIVITY (H): ICD-10-CM

## 2024-12-18 PROCEDURE — 255N000002 HC RX 255 OP 636: Performed by: INTERNAL MEDICINE

## 2024-12-18 PROCEDURE — 70553 MRI BRAIN STEM W/O & W/DYE: CPT

## 2024-12-18 PROCEDURE — A9585 GADOBUTROL INJECTION: HCPCS | Performed by: INTERNAL MEDICINE

## 2024-12-18 RX ORDER — GADOBUTROL 604.72 MG/ML
0.1 INJECTION INTRAVENOUS ONCE
Status: COMPLETED | OUTPATIENT
Start: 2024-12-18 | End: 2024-12-18

## 2024-12-18 RX ADMIN — GADOBUTROL 8 ML: 604.72 INJECTION INTRAVENOUS at 20:02

## 2025-01-06 ASSESSMENT — ACTIVITIES OF DAILY LIVING (ADL): PLEASE_INDICATE_YOR_PRIMARY_REASON_FOR_REFERRAL_TO_THERAPY:: SHOULDER

## 2025-01-07 ENCOUNTER — THERAPY VISIT (OUTPATIENT)
Dept: PHYSICAL THERAPY | Facility: REHABILITATION | Age: 70
End: 2025-01-07
Attending: INTERNAL MEDICINE
Payer: COMMERCIAL

## 2025-01-07 DIAGNOSIS — M85.859 OSTEOPENIA OF HIP, UNSPECIFIED LATERALITY: Primary | ICD-10-CM

## 2025-01-07 PROCEDURE — 97161 PT EVAL LOW COMPLEX 20 MIN: CPT | Mod: GP | Performed by: PHYSICAL THERAPIST

## 2025-01-07 PROCEDURE — 97110 THERAPEUTIC EXERCISES: CPT | Mod: GP | Performed by: PHYSICAL THERAPIST

## 2025-02-03 NOTE — TELEPHONE ENCOUNTER
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  MR Brain 12/18/2024   CT (HEAD, NECK, SPINE) N/A         IMPROVE score of 1 { icu  immobilization }   c/w dvt prophylaxsis and gi  prophylaxis's

## 2025-02-04 NOTE — PROGRESS NOTES
"    SUBJECTIVE:  HPI:  Carol Barkley  Is a 69 year old female who presents for new patient evaluation of low back pain and reported left foot neuropathy upon referral from Dr. Gold Mccarty, whose 12/6/2024 office note records:  \"Having low back pain- feet start tingling after starts walking. Starts walking and then gets burning pain in toes of left leg.\"    Medical history documents undated L5-S1 decompression in the past.    She had her decompression in 1991 and for the past 20 years she has had some tolerable mild nonradiating low back pain that has not really changed recently.  She does not like it but it is not really life limiting.  What is new in the past 2 years is the third and fourth toes of both feet left greater than right bother her when she is on her feet for more than 3 to 5 minutes.  This is life limiting because she enjoys walking and cross-country skiing.  She has generic orthotics called \"Superfeet\" but she has never had form fitted orthotics made.  They do help however with the foot numbness extending her walking tolerance but it is somewhat spotty in efficacy.  No major change in stress incontinence.  She does have a rectocele which is associated with some constipation but no bowel incontinence.  No saddle anesthesia no other sensory deficits in the legs and no leg weakness or other leg pain.    Pain score and diagram reviewed.  See questionnaire.      ROS: .  Otherwise negative for bowel/bladder dysfunction, balance changes, headache, leg pain/numbness/weakness, fevers, chills, night sweats, unexplained weight loss;  otherwise unremarkable.   See the patient's intake questionnaire from today for details.        MEDICATIONS:  Reviewed.    ALLERGIES:  Reviewed.     PAST MEDICAL/SURGICAL HISTORY:   Pertinent for hearing loss, elevated BMI, status post L5-S1 decompression, hypertension, anxiety, CKD stage II, 2011 vaginal hysterectomy with BSO    SOCIAL HX: Sports hobbies and activities: Walking, " cross-country skiing      OBJECTIVE:    IMAGING: Images and report reviewed    MR LUMBAR SPINE W/O CONTRAST  1/13/2023    L1-2: Bulge asymmetric to the left. Bilateral facet arthropathy and  ligamentum flavum thickening. Mild bilateral neural foraminal  stenosis. Mild spinal canal stenosis.     L2-3: Disc bulge with superimposed right subarticular and foraminal  protrusion. This narrows the lateral recess on the right, contacting  the descending right L3 nerve root. Bilateral facet arthropathy and  ligamentum flavum thickening. Mild bilateral neural foraminal  stenosis. No significant spinal canal stenosis.     L3-4: Disc bulge asymmetric to the right. Bilateral facet arthropathy  and ligamentum flavum thickening. Mild to moderate right neural  foraminal stenosis and mild left. No significant spinal canal  stenosis.     L4-5: Disc bulge with superimposed left foraminal protrusion.  Bilateral facet arthropathy and ligamentum flavum thickening.  Mild-to-moderate left neuroforaminal stenosis. No right neural  foraminal or spinal canal stenosis.     L5-S1: Bilateral facet arthropathy and ligamentum flavum thickening.  No foraminal or canal stenosis.                                                                  IMPRESSION:  1. Multilevel lumbar spondylosis without high-grade spinal canal  stenosis. Multilevel neural foraminal narrowings as detailed above.  2. Chronic T10 compression fracture deformity.       EXAMINATION:    --CONSTITUTIONAL:   No acute distress.  The patient is well nourished and well groomed.  Transitions well and moves fluidly.  Mildly elevated BMI.  Early pes planus and hallux valgus deformities bilaterally.  Metatarsal squeeze test negative bilaterally.  --SKIN:  Skin over the face, bilateral lower extremities, and posterior torso is clean, dry, intact without rashes.  Well-healed lower lumbar midline scar  --GAIT:  is non-antalgic. Flat foot, heel and toe walking:  normal   .  Squat and rise    normal    .  --STANDING EXAMINATION:    Symmetry of spine/pelvis   unremarkable   .      Range of motion full in flexion and extension.  Minor lumbosacral junction discomfort with both.   Standing flexion   negative   .    Ramona's sign   negative    .     Stork test   negative    .   --NEUROLOGICAL:     ROMBERG, TANDEM WALK, PRONATOR DRIFT:   Normal.   .  SENSATION to light touch is intact in bilateral thighs, lower legs and feet.   REFLEXES:  patellar 2, and achilles trace.  Babinski is negative. No clonus.  MANUAL MOTOR TESTING:  Hip flexion 5/5   Hip abduction 5/5   Hip adduction 5/5   Knee extension 5/5   Knee flexion 5/5   Ankle dorsiflexion 5/5   EHL 5/5   Ankle inversion 5/5   Ankle eversion 5/5  DURAL STRETCH TESTS:  SLR negative.     ASSESSMENT: Carol Barkley is a 69 year old female who presents  today for new patient evaluation of:    Status post L5-S1 decompression 1991  Chronic nonradiating low back pain  Normal neurologic exam  Tingling in toes with walking likely associated with falling distal metatarsal arches and intermittent impingement upon the digital nerve-possible Reynoso's neuroma      PLAN:  Consultation with podiatry and consideration for orthotics, particularly with distal metatarsal arch support.    Advised patient to call or return early if symptoms worsen, or having problems controlling bladder and bowel function or worsening leg weakness.     Please note: Voice recognition software was used in this dictation.  It may therefore contain typographical errors.    Jeff Barros MD

## 2025-02-11 ENCOUNTER — PRE VISIT (OUTPATIENT)
Dept: NEUROSURGERY | Facility: CLINIC | Age: 70
End: 2025-02-11

## 2025-02-11 ENCOUNTER — OFFICE VISIT (OUTPATIENT)
Dept: NEUROSURGERY | Facility: CLINIC | Age: 70
End: 2025-02-11
Payer: COMMERCIAL

## 2025-02-11 VITALS
DIASTOLIC BLOOD PRESSURE: 82 MMHG | HEIGHT: 66 IN | WEIGHT: 184 LBS | BODY MASS INDEX: 29.57 KG/M2 | HEART RATE: 72 BPM | SYSTOLIC BLOOD PRESSURE: 120 MMHG

## 2025-02-11 DIAGNOSIS — R20.2 INTERMITTENT TINGLING SENSATION OF LEFT HAND AND FOOT: Primary | ICD-10-CM

## 2025-02-11 DIAGNOSIS — R20.2 INTERMITTENT TINGLING SENSATION OF RIGHT HAND AND FOOT: ICD-10-CM

## 2025-02-11 PROCEDURE — 99203 OFFICE O/P NEW LOW 30 MIN: CPT | Performed by: PREVENTIVE MEDICINE

## 2025-02-11 ASSESSMENT — PAIN SCALES - GENERAL: PAINLEVEL_OUTOF10: MILD PAIN (3)

## 2025-02-11 NOTE — LETTER
"2/11/2025      Carol Barkley  [address]  [facility] [address]      Dear Colleague,    Thank you for referring your patient, Carol Barkley, to the [facility] [place]. Please see a copy of my visit note below.    SUBJECTIVE:  HPI:  Carol Barkley  Is a 69 year old female who presents for new patient evaluation of low back pain and reported left foot neuropathy upon referral from Dr. Gold Mccarty, whose 12/6/2024 office note records:  \"Having low back pain- feet start tingling after starts walking. Starts walking and then gets burning pain in toes of left leg.\"    Medical history documents undated L5-S1 decompression in the past.    She had her decompression in 1991 and for the past 20 years she has had some tolerable mild nonradiating low back pain that has not really changed recently.  She does not like it but it is not really life limiting.  What is new in the past 2 years is the third and fourth toes of both feet left greater than right bother her when she is on her feet for more than 3 to 5 minutes.  This is life limiting because she enjoys walking and cross-country skiing.  She has generic orthotics called \"Superfeet\" but she has never had form fitted orthotics made.  They do help however with the foot numbness extending her walking tolerance but it is somewhat spotty in efficacy.  No major change in stress incontinence.  She does have a rectocele which is associated with some constipation but no bowel incontinence.  No saddle anesthesia no other sensory deficits in the legs and no leg weakness or other leg pain.    Pain score and diagram reviewed.  See questionnaire.      ROS: .  Otherwise negative for bowel/bladder dysfunction, balance changes, headache, leg pain/numbness/weakness, fevers, chills, night sweats, unexplained weight loss;  otherwise unremarkable.   See the patient's intake questionnaire from today for details.      MEDICATIONS:  Reviewed.    ALLERGIES:  " Reviewed.     PAST MEDICAL/SURGICAL HISTORY:   Pertinent for hearing loss, elevated BMI, status post L5-S1 decompression, hypertension, anxiety, CKD stage II, 2011 vaginal hysterectomy with BSO    SOCIAL HX: Sports hobbies and activities: Walking, cross-country skiing      OBJECTIVE:  IMAGING: Images and report reviewed    MR LUMBAR SPINE W/O CONTRAST  1/13/2023    L1-2: Bulge asymmetric to the left. Bilateral facet arthropathy and  ligamentum flavum thickening. Mild bilateral neural foraminal  stenosis. Mild spinal canal stenosis.     L2-3: Disc bulge with superimposed right subarticular and foraminal  protrusion. This narrows the lateral recess on the right, contacting  the descending right L3 nerve root. Bilateral facet arthropathy and  ligamentum flavum thickening. Mild bilateral neural foraminal  stenosis. No significant spinal canal stenosis.     L3-4: Disc bulge asymmetric to the right. Bilateral facet arthropathy  and ligamentum flavum thickening. Mild to moderate right neural  foraminal stenosis and mild left. No significant spinal canal  stenosis.     L4-5: Disc bulge with superimposed left foraminal protrusion.  Bilateral facet arthropathy and ligamentum flavum thickening.  Mild-to-moderate left neuroforaminal stenosis. No right neural  foraminal or spinal canal stenosis.     L5-S1: Bilateral facet arthropathy and ligamentum flavum thickening.  No foraminal or canal stenosis.                                                                  IMPRESSION:  1. Multilevel lumbar spondylosis without high-grade spinal canal  stenosis. Multilevel neural foraminal narrowings as detailed above.  2. Chronic T10 compression fracture deformity.       EXAMINATION:  --CONSTITUTIONAL:   No acute distress.  The patient is well nourished and well groomed.  Transitions well and moves fluidly.  Mildly elevated BMI.  Early pes planus and hallux valgus deformities bilaterally.  Metatarsal squeeze test negative  bilaterally.  --SKIN:  Skin over the face, bilateral lower extremities, and posterior torso is clean, dry, intact without rashes.  Well-healed lower lumbar midline scar  --GAIT:  is non-antalgic. Flat foot, heel and toe walking:  normal   .  Squat and rise   normal    .  --STANDING EXAMINATION:    Symmetry of spine/pelvis   unremarkable   .      Range of motion full in flexion and extension.  Minor lumbosacral junction discomfort with both.   Standing flexion   negative   .    Ramona's sign   negative    .     Stork test   negative    .   --NEUROLOGICAL:     ROMBERG, TANDEM WALK, PRONATOR DRIFT:   Normal.   .  SENSATION to light touch is intact in bilateral thighs, lower legs and feet.   REFLEXES:  patellar 2, and achilles trace.  Babinski is negative. No clonus.  MANUAL MOTOR TESTING:  Hip flexion 5/5   Hip abduction 5/5   Hip adduction 5/5   Knee extension 5/5   Knee flexion 5/5   Ankle dorsiflexion 5/5   EHL 5/5   Ankle inversion 5/5   Ankle eversion 5/5  DURAL STRETCH TESTS:  SLR negative.     ASSESSMENT: Carol Barkley is a 69 year old female who presents  today for new patient evaluation of:    Status post L5-S1 decompression 1991  Chronic nonradiating low back pain  Normal neurologic exam  Tingling in toes with walking likely associated with falling distal metatarsal arches and intermittent impingement upon the digital nerve-possible Reynoso's neuroma    PLAN:  Consultation with podiatry and consideration for orthotics, particularly with distal metatarsal arch support.    Advised patient to call or return early if symptoms worsen, or having problems controlling bladder and bowel function or worsening leg weakness.     Please note: Voice recognition software was used in this dictation.  It may therefore contain typographical errors.    Jeff Barros MD         Again, thank you for allowing me to participate in the care of your patient.      Sincerely,    Jeff Barros MD  Electronically signed

## 2025-02-11 NOTE — PATIENT INSTRUCTIONS
Carol, it was very nice to meet you and I am quite certain that your foot symptoms are unrelated to your back the latter of which appears to be aging gracefully.  It is possible that your distal metatarsal arches in your feet are collapsing with prolonged walking which then pinches nerves between your toes.  I will have a podiatrist take a look at you and discuss possible form fitted orthotics, or other options.  See the assessment and plan below for further details of our conversation today and I wish you all the best.      ASSESSMENT: Carol Barkley is a 69 year old female who presents  today for new patient evaluation of:    Status post L5-S1 decompression 1991  Chronic nonradiating low back pain  Normal neurologic exam  Tingling in toes with walking likely associated with falling distal metatarsal arches and intermittent impingement upon the digital nerve-possible Reynoso's neuroma      PLAN:  Consultation with podiatry and consideration for orthotics, particularly with distal metatarsal arch support.

## 2025-02-11 NOTE — NURSING NOTE
"Reason For Visit:   Chief Complaint   Patient presents with    Consult     Low back pain with toe numbness         Occupation: organist  Currently working? Yes.  Work status?  Part-time.    Sports: cross country skiing  Activities: walking             /82   Pulse 72   Ht 1.676 m (5' 6\")   Wt 83.5 kg (184 lb)   LMP 11/30/2004   BMI 29.70 kg/m        Allergies   Allergen Reactions    Dust Mite Extract     Grass     Mold        Current Outpatient Medications   Medication Sig Dispense Refill    clobetasol (TEMOVATE) 0.05 % ointment       fluocinonide (LIDEX) 0.05 % external solution Apply 1 Application topically daily      fluticasone (FLONASE) 50 MCG/ACT nasal spray Spray 1 spray into both nostrils daily      ketoconazole (NIZORAL) 2 % external shampoo Apply topically daily as needed for itching or irritation 120 mL 6    losartan (COZAAR) 100 MG tablet Take 1 tablet (100 mg) by mouth daily. 90 tablet 11    pravastatin (PRAVACHOL) 20 MG tablet Take 1 tablet (20 mg) by mouth daily. 90 tablet 3    SUMAtriptan (IMITREX) 25 MG tablet Take 1 tablet (25 mg) by mouth at onset of headache for migraine 9 tablet 0    tretinoin (RETIN-A) 0.1 % external cream Apply topically at bedtime      polyethylene glycol (MIRALAX) 17 GM/Dose powder Take 17 g by mouth 2 times daily as needed for constipation. 510 g 3     No current facility-administered medications for this visit.         Darla Severin-Brown, TOMA   "

## 2025-02-12 ENCOUNTER — PATIENT OUTREACH (OUTPATIENT)
Dept: CARE COORDINATION | Facility: CLINIC | Age: 70
End: 2025-02-12
Payer: COMMERCIAL

## 2025-02-23 ENCOUNTER — HEALTH MAINTENANCE LETTER (OUTPATIENT)
Age: 70
End: 2025-02-23

## 2025-03-05 ENCOUNTER — NURSE TRIAGE (OUTPATIENT)
Dept: NURSING | Facility: CLINIC | Age: 70
End: 2025-03-05
Payer: COMMERCIAL

## 2025-03-05 ENCOUNTER — HOSPITAL ENCOUNTER (EMERGENCY)
Facility: HOSPITAL | Age: 70
Discharge: HOME | End: 2025-03-05
Payer: COMMERCIAL

## 2025-03-05 VITALS
TEMPERATURE: 98.2 F | RESPIRATION RATE: 18 BRPM | HEART RATE: 74 BPM | SYSTOLIC BLOOD PRESSURE: 135 MMHG | OXYGEN SATURATION: 94 % | DIASTOLIC BLOOD PRESSURE: 76 MMHG | WEIGHT: 180 LBS | BODY MASS INDEX: 29.05 KG/M2

## 2025-03-05 DIAGNOSIS — M25.562 ACUTE PAIN OF LEFT KNEE: ICD-10-CM

## 2025-03-05 PROCEDURE — 99284 EMERGENCY DEPT VISIT MOD MDM: CPT | Mod: 25

## 2025-03-05 PROCEDURE — 250N000013 HC RX MED GY IP 250 OP 250 PS 637

## 2025-03-05 RX ORDER — ACETAMINOPHEN 325 MG/1
975 TABLET ORAL ONCE
Status: COMPLETED | OUTPATIENT
Start: 2025-03-05 | End: 2025-03-05

## 2025-03-05 RX ADMIN — ACETAMINOPHEN 975 MG: 325 TABLET ORAL at 21:35

## 2025-03-05 ASSESSMENT — ACTIVITIES OF DAILY LIVING (ADL)
ADLS_ACUITY_SCORE: 41

## 2025-03-05 ASSESSMENT — COLUMBIA-SUICIDE SEVERITY RATING SCALE - C-SSRS
2. HAVE YOU ACTUALLY HAD ANY THOUGHTS OF KILLING YOURSELF IN THE PAST MONTH?: NO
6. HAVE YOU EVER DONE ANYTHING, STARTED TO DO ANYTHING, OR PREPARED TO DO ANYTHING TO END YOUR LIFE?: NO
1. IN THE PAST MONTH, HAVE YOU WISHED YOU WERE DEAD OR WISHED YOU COULD GO TO SLEEP AND NOT WAKE UP?: NO

## 2025-03-06 NOTE — TELEPHONE ENCOUNTER
Nurse Triage SBAR    Is this a 2nd Level Triage? NO    Situation: Patient calling.     Background: Left left    Assessment: Pt was walking today and stated something happened to her left knee. She is having pain when leg extended, rates pain 9/10.  Unable to bear weight due to the pain.  No obvious swelling. She did not take pain medication.     Protocol Recommended Disposition:   See HCP Within 4 Hours (Or PCP Triage)    Recommendation: UC now.  Pt is going to [facility] now.           Does the patient meet one of the following criteria for ADS visit consideration? 16+ years old, with an [facility] PCP     TIP  Providers, please consider if this condition is appropriate for management at one of our Acute and Diagnostic Services sites.     If patient is a good candidate, please use dotphrase <dot>triageresponse and select Refer to ADS to document.    Reason for Disposition   [1] Thigh, calf, or ankle swelling AND [2] only 1 side    Additional Information   Negative: Sounds like a life-threatening emergency to the triager   Negative: [1] Swollen joint AND [2] fever   Negative: [1] Red area or streak AND [2] fever   Negative: Patient sounds very sick or weak to the triager   Negative: [1] SEVERE pain (e.g., excruciating, unable to walk) AND [2] not improved after 2 hours of pain medicine   Negative: [1] Can't move swollen joint at all AND [2] no fever   Negative: [1] Thigh or calf pain AND [2] only 1 side AND [3] present > 1 hour    Protocols used: Knee Pain-A-

## 2025-03-06 NOTE — DISCHARGE INSTRUCTIONS
Your x-ray was normal. Your ultrasound was normal. Call [facility] tomorrow to schedule an appointment for follow up if you continue to have pain.    For pain management;   You may take Tylenol 1000mg every 6 hours as needed, do not take more than 4000mg (4g) in 24 hours.  You may take ibuprofen 600-800mg every 8 hours as needed.   You can rotate between the two every 4 hours.  Apply ice to the area for 20 minutes on, 20 minutes off throughout the day.   Elevate the knee as often as possible to help with swelling.

## 2025-03-06 NOTE — ED PROVIDER NOTES
Emergency Department Encounter   NAME: Carol Barkley ; AGE: 69 year old female ; YOB: 1955 ; MRN: [id number] ; PCP: Precious Akins   ED PROVIDER: Sydni Xie PA-C    Evaluation Date & Time:   3/5/2025  7:54 PM    CHIEF COMPLAINT:  Knee Pain      Impression and Plan   MDM: Carol Barkley is a 69 year old female who presents to the ED for evaluation of left knee pain.  [place] when she began to have sudden onset left knee pain.  Denies falling, tripping, twisting her knee, hearing a pop, no traumatic injury of any sort.  Since then has been unable to put any pressure on the left knee due to pain.  No history of knee surgeries, prior knee injuries, injections into the area.     Patient is vitally normal, no acute distress, sitting comfortably in exam bed with left knee bent at a 30 to 45 degree angle, which patient states is most comfortable. Physical exam is significant for no swelling, warmth, overlying skin changes to the knee joint to suggest cellulitis or infection.  Patient is able to flex/extend at the knee joint normally with pain elicited with extension.  Mild tenderness to palpation along the medial joint line, but no overt tenderness otherwise.  There is significant tenderness to palpation along the posterior calf of the left lower extremity, but no swelling, pitting edema, skin changes of the calf to suggest DVT otherwise.  Foot/ankle ROM intact, distal CMS intact.  Good distal pulses. Differential diagnosis includes fracture, dislocation, muscle sprain/strain, DVT, meniscal injury, ligament/tendon injury.  Will get x-rays initially for evaluation.    I independently reviewed and interpreted x-ray imaging of the left knee and tibia/fibula, which were all overall normal without acute bony injury.  Discussed these results with the patient she continues to be exquisitely tender on palpation of her posterior left calf, will  get an ultrasound to rule out DVT at this point  As well as a dose of Tylenol to help with pain.  Ultrasound results showed no DVT bilaterally, although there is a small left joint effusion.      On reevaluation, patient reports significant improvement in her pain after dose of Tylenol.  We discussed that her ultrasound results are overall negative without signs of DVT.  Repeat knee exam continues to have tenderness over the medial joint line, there is no ligamentous laxity or obvious meniscal.  At this point we have ruled out fracture/dislocation and DVT, I have some suspicion for meniscal injury or ligament injury at this point with the small effusion and continued pain, but no emergent MRI imaging is indicated at this time.  Will get patient into a knee immobilizer with crutches, and follow-up with [facility] which she overall agrees with. Stable for discharge.     Return precautions to the ED were discussed, patient verbalized understanding and were agreeable with the plan. All questions were answered.     Per chart review:  -Called nurse triage line earlier today to discuss her knee injury, encouraged to go to ED  - Recent labs and imaging reviewed  - Care everywhere reviewed    Medical Decision Making  Obtained supplemental history:Supplemental history obtained?: No  Reviewed external records: External records reviewed?: Outpatient Record: 2/11/2025, [facility] impacted by chronic illness:Hyperlipidemia and Hypertension  Did you consider but not order tests?: Work up considered but not performed and documented in chart, if applicable  Did you interpret images independently?: Independent interpretation of ECG and images noted in documentation, when applicable.  Consultation discussion with other provider:Did you involve another provider (consultant, , pharmacy, etc.)?: No  Discharge. No recommendations on prescription strength medication(s). I considered  admission, but discharged patient after significant clinical improvement.    MIPS:  Not Applicable    ED COURSE:  8:34 PM I met and introduced myself to the patient. I gathered initial history and performed my physical exam. We discussed plan for initial workup.   10:30 PM I rechecked the patient and discussed results, discharge, follow up, and reasons to return to the ED.       FINAL IMPRESSION:    ICD-10-CM    1. Acute pain of left knee  M25.562 Crutches Order     Ankle/Knee Bracing Supplies Order Knee Immobilizer; Left            MEDICATIONS GIVEN IN THE EMERGENCY DEPARTMENT:  Medications   acetaminophen (TYLENOL) tablet 975 mg (975 mg Oral $Given 3/5/25 7687)         NEW PRESCRIPTIONS STARTED AT TODAY'S ED VISIT:  New Prescriptions    No medications on file         HPI   Use of Intrepreter: N/A     Carol Barkley is a 69 year old female with a pertinent history of migraine without aura, hyperlipidemia, hypertension, elevated LDL, GERD, bilateral hearing loss, and spinal stenosis of lumbar region who presents to the ED by walk-in for evaluation of left knee pain.    [place] when she suddenly developed left knee pain to the point that she was no longer able to bear any weight on the affected leg; no apparent injury or other reason for said pain. She was able to get home with the help of her  who came to get her with their car and with the use of walking sticks. Now in the emergency department, the patient states that it is more comfortable for her to bend her left knee than keep her leg straight. Patient took 3 ibuprofen at ~1800 without significant relief of her pain. She does mention that 15 years ago, she experienced pain to one of her knees (unsure which) which she required a knee sleeve for; she is unsure what might have happened to cause this episode of knee pain, however.     Patient denies new leg swelling or any other symptoms at this time. She does not  have a history of blood clots, hormone use, blood thinners, knee surgery, knee injury, or injections.       Per chart review: Patient was seen 2/11/2025 at [facility] for evaluation of low back pain with toe numbness/tingling upon referral from Dr. Gold Mccarty. Noted patient was S/P L5-S1 decompression in 1991. Normal neurologic exam. Plan at that time was for consult with Podiatry and consideration for orthotics.         REVIEW OF SYSTEMS:  Pertinent positive and negative symptoms per HPI.       Medical History     Past Medical History:   Diagnosis Date    Allergic rhinitis due to other allergen     Anxiety state 4/17/2006    Arthritis 2015    Chalazion of right upper eyelid 03/24/2017    CKD (chronic kidney disease) stage 2, GFR 60-89 ml/min     Cough     Cystourethrocele     Disturbance of skin sensation 04/17/2006    Extensor tendon rupture of hand, right, initial encounter 07/28/2022    Female stress incontinence 11/9/2017    GERD (gastroesophageal reflux disease) 01/09/2012    Hearing loss     Hypertension 2021    Lichen sclerosus et atrophicus of the vulva     Migraine without aura, without mention of intractable migraine without mention of status migrainosus     Migraine without status migrainosus, not intractable, unspecified migraine type 12/04/2015    PAC (premature atrial contraction) 12/2009    Pain in joint, lower leg 12/19/2011    Palpitations 05/17/2006    Personal history of colonic polyps     Pure hypercholesterolemia     PVD (posterior vitreous detachment)     Rectocele     Right wrist pain 05/01/2022    Unspecified sinusitis (chronic)     Urine, incontinence, stress female        Past Surgical History:   Procedure Laterality Date    ABDOMEN SURGERY  1972    BREAST BIOPSY, RT/LT      COLONOSCOPY  01/01/2014    COLONOSCOPY  01/08/2014    Procedure: COLONOSCOPY;  colonoscopy  ;  Surgeon: Jose Newton MD;  Location:  GI    COLONOSCOPY N/A 02/17/2023     Procedure: COLONOSCOPY;  Surgeon: Enrico Farah MD;  [place] [facility] [place]    ENT SURGERY  1964    tonsilectomy    HC REMOVAL GALLBLADDER      Cholecystectomy    HYSTERECTOMY VAGINAL  05/17/2011    w/ bilateral salphingo-oophorectomy, eterocele reapir w/ Jamie culdoplasty, anterior colporrhaphy, posterior colpoperineorrhaphy, pubovaginal sling (mini-Arc) placement, cystoscopy and suprapubic catheter palcement    HYSTERECTOMY, PAP NO LONGER INDICATED      HYSTEROSCOPY  02/01/2007    D&C    ORTHOPEDIC SURGERY  Broken wrist 2022    SOFT TISSUE SURGERY  Tendon transfer 2022    TRANSFER, TENDON FOREARM, WRIST, HAND Right 08/05/2022    Procedure: RIGHT TENDON TRANSFER EXTENSOR INDICIS PROPRIUS TO EXTENSOR POLLICIS LONGUS;  Surgeon: Geraldo Fischer MD;  [place] [facility] [place]    TUBAL LIGATION  01/01/1989    ZZC APPENDECTOMY      ZZC NARANJO W/O FACETEC FORAMOT/DSKC 1/2 VRT SEG, LUMBAR      L5-S1    ZZC STRESS ECHO (TREADMILL) FL  04/01/2006       Family History   Problem Relation Age of Onset    Heart Disease Mother     Breast Cancer Mother         age 70 dx    Fuch's dystrophy Mother     C.A.D. Father     Neurologic Disorder Father         parkinsons    Hypertension Father     Hyperlipidemia Father     Depression Father         Parkinson's Disease    Anxiety Disorder Father         Parkinsons Disease    Hypertension Sister     Lung Cancer Sister         surgical cure    Other Cancer Sister         Lung Cancer    Fuch's dystrophy Sister     Cancer - colorectal Maternal Aunt     Cancer - colorectal Maternal Uncle     Cancer Paternal Uncle         bone    Other Cancer Cousin         Ovarian Cancer    Colon Cancer Other         Maternal Uncle    Diabetes Other         Child diabetes    Colon Cancer Other         Maternal Aunt    Breast Cancer Cousin         Breast Cancer age 66       Social History     Tobacco Use    Smoking status: Never    Smokeless tobacco: Never   Vaping Use    Vaping status: Never Used   Substance Use Topics     Alcohol use: No     Comment: 1 drinks per week at most    Drug use: No       clobetasol (TEMOVATE) 0.05 % ointment  fluocinonide (LIDEX) 0.05 % external solution  fluticasone (FLONASE) 50 MCG/ACT nasal spray  ketoconazole (NIZORAL) 2 % external shampoo  losartan (COZAAR) 100 MG tablet  pravastatin (PRAVACHOL) 20 MG tablet  SUMAtriptan (IMITREX) 25 MG tablet  tretinoin (RETIN-A) 0.1 % external cream          Physical Exam     First Vitals:  Patient Vitals for the past 24 hrs:   BP Temp Temp src Pulse Resp SpO2 Weight   03/05/25 2247 -- -- -- 74 -- 94 % --   03/05/25 2232 -- -- -- 71 -- 95 % --   03/05/25 2217 -- -- -- 68 -- 95 % --   03/05/25 2202 -- -- -- 68 -- 97 % --   03/05/25 2132 135/76 -- -- 66 -- 96 % --   03/05/25 2117 131/75 -- -- 68 -- 96 % --   03/05/25 2100 131/74 -- -- 69 -- 95 % --   03/05/25 2045 133/75 -- -- 71 -- 98 % --   03/05/25 2030 126/69 -- -- 79 18 98 % --   03/05/25 1948 139/85 98.2  F (36.8  C) Oral 90 20 97 % 81.6 kg (180 lb)         PHYSICAL EXAM:   Physical Exam  Constitutional:       General: She is not in acute distress.     Appearance: She is well-developed. She is not ill-appearing.   HENT:      Head: Normocephalic.      Nose: Nose normal. No congestion.      Mouth/Throat:      Mouth: Mucous membranes are moist.   Eyes:      Conjunctiva/sclera: Conjunctivae normal.   Cardiovascular:      Rate and Rhythm: Normal rate and regular rhythm.   Pulmonary:      Effort: Pulmonary effort is normal.      Breath sounds: Normal breath sounds.   Abdominal:      General: Abdomen is flat.      Palpations: There is no mass.      Tenderness: There is no abdominal tenderness. There is no guarding.   Musculoskeletal:      Cervical back: Neck supple.      Left hip: No lacerations, tenderness or bony tenderness. Normal range of motion.      Left knee: No swelling, deformity, effusion, erythema, ecchymosis or bony tenderness. Normal range of motion (pain with extension). Tenderness present over the  medial joint line. No LCL laxity, MCL laxity, ACL laxity or PCL laxity.Normal pulse.      Left lower leg: Tenderness (posterior calf) present. No swelling or bony tenderness. No edema.      Left ankle: No swelling. No tenderness. Normal range of motion. Anterior drawer test negative.      Left foot: Normal.   Skin:     General: Skin is warm.      Capillary Refill: Capillary refill takes less than 2 seconds.   Neurological:      General: No focal deficit present.      Mental Status: She is alert and oriented to person, place, and time.   Psychiatric:         Mood and Affect: Mood normal.         Behavior: Behavior normal.             Results     RADIOLOGY:  US Lower Extremity Venous Duplex Left   Final Result   IMPRESSION:   1.  No deep venous thrombosis in the left lower extremity.      2.  Small left knee joint effusion.      XR Tibia and Fibula Left 2 Views   Final Result   IMPRESSION: Normal tibia and fibula.      XR Knee Left 3 Views   Final Result   IMPRESSION: Mild patellofemoral osteoarthrosis. No fracture, effusion or calcified intra-articular body.          PROCEDURES:  None      I, Gerald Grey, am serving as a scribe to document services personally performed by Sydni Xie PA-C, based on my observation and the provider's statements to me. ISydni PA-C attest that Gerald Grey is acting in a scribe capacity, has observed my performance of the services and has documented them in accordance with my direction.       Sydni Xie PA-C   Emergency Medicine   [facility] EMERGENCY DEPARTMENT      Sydni Xie PA-C  03/05/25 2198

## 2025-03-06 NOTE — ED NOTES
Pt reports tenderness at left knee with and without palpation. She denies trauma or injury to the area. She states she is unable to bear weight on the LLE. She is able to extend her LLE and has ACROM in LE bilaterally.

## 2025-04-19 ENCOUNTER — OFFICE VISIT (OUTPATIENT)
Dept: URGENT CARE | Facility: URGENT CARE | Age: 70
End: 2025-04-19
Payer: COMMERCIAL

## 2025-04-19 VITALS
SYSTOLIC BLOOD PRESSURE: 142 MMHG | OXYGEN SATURATION: 100 % | HEART RATE: 65 BPM | WEIGHT: 192 LBS | DIASTOLIC BLOOD PRESSURE: 85 MMHG | TEMPERATURE: 97.7 F | BODY MASS INDEX: 30.99 KG/M2

## 2025-04-19 DIAGNOSIS — H00.012 HORDEOLUM EXTERNUM OF RIGHT LOWER EYELID: Primary | ICD-10-CM

## 2025-04-19 PROCEDURE — 99213 OFFICE O/P EST LOW 20 MIN: CPT | Performed by: FAMILY MEDICINE

## 2025-04-19 PROCEDURE — 3079F DIAST BP 80-89 MM HG: CPT | Performed by: FAMILY MEDICINE

## 2025-04-19 PROCEDURE — 3077F SYST BP >= 140 MM HG: CPT | Performed by: FAMILY MEDICINE

## 2025-04-19 RX ORDER — ERYTHROMYCIN 5 MG/G
0.5 OINTMENT OPHTHALMIC 4 TIMES DAILY
Qty: 3.5 G | Refills: 0 | Status: SHIPPED | OUTPATIENT
Start: 2025-04-19 | End: 2025-04-26

## 2025-04-19 NOTE — PROGRESS NOTES
Assessment & Plan     Hordeolum externum of right lower eyelid  Differentials discussed in detail.  Suspect right lower eyelid swelling related to stye.  Recommended regular eye wash, warm compresses and erythromycin ointment prescribed.  Instructed to follow-up with ophthalmology if symptoms persist or worsen.  All questions answered.  - erythromycin (ROMYCIN) 5 MG/GM ophthalmic ointment; Place 0.5 inches into the right eye 4 times daily for 7 days.        Addi Medina is a 70 year old, presenting for the following health issues:  Eye Problem        4/19/2025     6:11 PM   Additional Questions   Roomed by TED Rosenbaum     HPI      Concern -   Onset: yesterday   Description: right lower eyelid swelling, may be a bump, started yesterday, doesn't wear contact lenses, no discharge   Intensity: moderate  Progression of Symptoms:  same  Accompanying Signs & Symptoms: none  Previous history of similar problem:   Therapies tried and outcome: None        Review of Systems  Constitutional, HEENT, cardiovascular, pulmonary, gi and gu systems are negative, except as otherwise noted.      Objective    BP (!) 142/85 (BP Location: Right arm, Patient Position: Sitting, Cuff Size: Adult Large)   Pulse 65   Temp 97.7  F (36.5  C) (Temporal)   Wt 87.1 kg (192 lb)   LMP 11/30/2004   SpO2 100%   BMI 30.99 kg/m    Body mass index is 30.99 kg/m .  Physical Exam   GENERAL: alert and no distress  EYES: Right lower eyelid mildly swollen medially, tiny yellowish pustule, no conjunctival discharge or erythema noted, no foreign body visualized, PERRLA  RESP: no wheezes  MS: no gross musculoskeletal defects noted, no edema  NEURO: Normal strength and tone, mentation intact and speech normal  PSYCH: mentation appears normal, affect normal/bright      Signed Electronically by: Adalid Rey MD

## 2025-04-19 NOTE — CONFIDENTIAL NOTE
Urgent Care Clinic Visit    Chief Complaint   Patient presents with    Eye Problem     RIGHT SIDE, lower around swelling, may be a bump, started yesterday, doesn't wear contact lenses FYI, no discharge                   4/19/2025     6:11 PM   Additional Questions   Roomed by TED Rosenbaum

## 2025-05-19 ENCOUNTER — OFFICE VISIT (OUTPATIENT)
Dept: URGENT CARE | Facility: URGENT CARE | Age: 70
End: 2025-05-19
Payer: COMMERCIAL

## 2025-05-19 VITALS
DIASTOLIC BLOOD PRESSURE: 85 MMHG | WEIGHT: 190 LBS | BODY MASS INDEX: 30.53 KG/M2 | OXYGEN SATURATION: 98 % | RESPIRATION RATE: 20 BRPM | HEIGHT: 66 IN | TEMPERATURE: 98.1 F | HEART RATE: 72 BPM | SYSTOLIC BLOOD PRESSURE: 135 MMHG

## 2025-05-19 DIAGNOSIS — J02.9 SORE THROAT: ICD-10-CM

## 2025-05-19 DIAGNOSIS — J01.40 ACUTE NON-RECURRENT PANSINUSITIS: Primary | ICD-10-CM

## 2025-05-19 LAB
DEPRECATED S PYO AG THROAT QL EIA: NEGATIVE
S PYO DNA THROAT QL NAA+PROBE: NOT DETECTED

## 2025-05-19 PROCEDURE — 99213 OFFICE O/P EST LOW 20 MIN: CPT | Performed by: STUDENT IN AN ORGANIZED HEALTH CARE EDUCATION/TRAINING PROGRAM

## 2025-05-19 PROCEDURE — 3075F SYST BP GE 130 - 139MM HG: CPT | Performed by: STUDENT IN AN ORGANIZED HEALTH CARE EDUCATION/TRAINING PROGRAM

## 2025-05-19 PROCEDURE — 3079F DIAST BP 80-89 MM HG: CPT | Performed by: STUDENT IN AN ORGANIZED HEALTH CARE EDUCATION/TRAINING PROGRAM

## 2025-05-19 PROCEDURE — 87651 STREP A DNA AMP PROBE: CPT | Performed by: STUDENT IN AN ORGANIZED HEALTH CARE EDUCATION/TRAINING PROGRAM

## 2025-05-19 RX ORDER — AMOXICILLIN 875 MG/1
875 TABLET, COATED ORAL 2 TIMES DAILY
Qty: 14 TABLET | Refills: 0 | Status: SHIPPED | OUTPATIENT
Start: 2025-05-19 | End: 2025-05-26

## 2025-05-19 NOTE — PROGRESS NOTES
ASSESSMENT & PLAN:   Diagnoses and all orders for this visit:  Acute non-recurrent pansinusitis  -     amoxicillin (AMOXIL) 875 MG tablet; Take 1 tablet (875 mg) by mouth 2 times daily for 7 days.  Sore throat  -     Streptococcus A Rapid Screen w/Reflex to PCR - Clinic Collect  -     Group A Streptococcus PCR Throat Swab    URI symptoms x 2.5 weeks.  Vital stable. Clear lung sounds with no increased work of breathing.  Rapid strep test negative, PCR pending.  Discussed that I do not think antibiotic treatment necessary at this time as symptoms are slowly improving.   Did send Rx for amoxicillin x 7 days if symptoms acutely worsen since [place]. Patient understands to hold treatment unless symptoms worsen.    There are no Patient Instructions on file for this visit.    No follow-ups on file.    At the end of the encounter, I discussed results, diagnosis, medications. Discussed red flags for immediate return to clinic/ER, as well as indications for follow up if no improvement. Patient and/or caregiver understood and agreed to plan. Patient was stable for discharge.    ------------------------------------------------------------------------  SUBJECTIVE  History was obtained from patient.    Patient presents with:  Pharyngitis: Lingering symptoms from a 2 week cold. Sore throat came on last Thursday. Trouble swallowing.     HPI  Carol Barkley is a(n) 70 year old female presenting to urgent care for URI symptoms x 2.5 weeks. Reports cough, congestion, rhinorrhea. Those symptoms slightly improved since onset. She developed sore throat x 5 days and worsening pain with swallowing x 2 days. No fever. No chest pain or shortness of breath. Using OTC cough and decongestant medication with some improvement. Grandchild was sick who got her  sick prior to her getting symptoms. She is leaving for a 2-week trip to [place] in 3 days.     Current Outpatient Medications   Medication Sig Dispense Refill     "amoxicillin (AMOXIL) 875 MG tablet Take 1 tablet (875 mg) by mouth 2 times daily for 7 days. 14 tablet 0    clobetasol (TEMOVATE) 0.05 % ointment       fluocinonide (LIDEX) 0.05 % external solution Apply 1 Application topically daily      fluticasone (FLONASE) 50 MCG/ACT nasal spray Spray 1 spray into both nostrils daily      ketoconazole (NIZORAL) 2 % external shampoo Apply topically daily as needed for itching or irritation 120 mL 6    losartan (COZAAR) 100 MG tablet Take 1 tablet (100 mg) by mouth daily. 90 tablet 11    pravastatin (PRAVACHOL) 20 MG tablet Take 1 tablet (20 mg) by mouth daily. 90 tablet 3    SUMAtriptan (IMITREX) 25 MG tablet Take 1 tablet (25 mg) by mouth at onset of headache for migraine 9 tablet 0    tretinoin (RETIN-A) 0.1 % external cream Apply topically at bedtime           OBJECTIVE  Vitals:    05/19/25 1749   BP: 135/85   BP Location: Right arm   Patient Position: Sitting   Cuff Size: Adult Regular   Pulse: 72   Resp: 20   Temp: 98.1  F (36.7  C)   TempSrc: Oral   SpO2: 98%   Weight: 86.2 kg (190 lb)   Height: 1.676 m (5' 5.98\")     Physical Exam   GENERAL: healthy, alert, no acute distress.   PSYCH: mentation appears normal. Normal affect  HEAD: normocephalic, atraumatic.  EYE: PERRL. EOMs intact. No scleral injection bilaterally.   EAR: external ear normal. Bilateral ear canals normal and nonpainful. Bilateral TM intact, pearly, translucent without bulging.  NOSE: external nose atraumatic without lesions.  OROPHARYNX: moist mucous membranes. Posterior pharynx with mild erythema. No exudate. Uvula midline. Patent airway.  NECK: nontender. No cervical adenopathy.   LUNGS: no increased work of breathing. Clear lung sounds bilaterally. No wheezing, rhonchi, or rales.   CV: regular rate and rhythm. No clicks, murmurs, or rubs.    Results for orders placed or performed in visit on 05/19/25   Streptococcus A Rapid Screen w/Reflex to PCR - Clinic Collect     Status: Normal    Specimen: Throat; " Swab   Result Value Ref Range    Group A Strep antigen Negative Negative

## 2025-06-10 ENCOUNTER — HOSPITAL ENCOUNTER (OUTPATIENT)
Dept: ULTRASOUND IMAGING | Facility: HOSPITAL | Age: 70
Discharge: HOME | End: 2025-06-10
Attending: INTERNAL MEDICINE
Payer: COMMERCIAL

## 2025-06-10 ENCOUNTER — RESULTS FOLLOW-UP (OUTPATIENT)
Dept: FAMILY MEDICINE | Facility: CLINIC | Age: 70
End: 2025-06-10

## 2025-06-10 ENCOUNTER — OFFICE VISIT (OUTPATIENT)
Dept: FAMILY MEDICINE | Facility: CLINIC | Age: 70
End: 2025-06-10
Payer: COMMERCIAL

## 2025-06-10 VITALS
RESPIRATION RATE: 16 BRPM | SYSTOLIC BLOOD PRESSURE: 132 MMHG | DIASTOLIC BLOOD PRESSURE: 86 MMHG | OXYGEN SATURATION: 99 % | HEART RATE: 68 BPM | TEMPERATURE: 97 F

## 2025-06-10 DIAGNOSIS — M79.89 SWELLING OF LEFT LOWER EXTREMITY: ICD-10-CM

## 2025-06-10 DIAGNOSIS — D32.9 MENINGIOMA (H): ICD-10-CM

## 2025-06-10 DIAGNOSIS — G43.109 MIGRAINE WITH AURA AND WITHOUT STATUS MIGRAINOSUS, NOT INTRACTABLE: ICD-10-CM

## 2025-06-10 DIAGNOSIS — S83.242D TEAR OF MEDIAL MENISCUS OF LEFT KNEE, CURRENT, UNSPECIFIED TEAR TYPE, SUBSEQUENT ENCOUNTER: ICD-10-CM

## 2025-06-10 DIAGNOSIS — Z23 NEED FOR VACCINATION: ICD-10-CM

## 2025-06-10 PROBLEM — R06.09 DOE (DYSPNEA ON EXERTION): Status: RESOLVED | Noted: 2022-12-13 | Resolved: 2025-06-10

## 2025-06-10 PROCEDURE — 93971 EXTREMITY STUDY: CPT | Mod: LT

## 2025-06-10 ASSESSMENT — ENCOUNTER SYMPTOMS: SEIZURES: 1

## 2025-06-10 NOTE — PATIENT INSTRUCTIONS
- I recommend stopping the sumatriptan because you had some signs of chronic small vessel disease on your MRI  (this is not unexpected)    - I recommend you see Neurology to discuss alternatives to sumatriptan to stop migraines and to talk about the meningioma (right now, I would just repeat a brain MRI in 1 year)    - In the meantime, you could try taking magnesium glycinate 200-300 mg and/or riboflavin 200-300 mg nightly to help prevent migraines    - I also recommend getting an ultrasound of your left leg    - Make follow up with [facility] to discuss repair options and physical therapy options

## 2025-06-10 NOTE — PROGRESS NOTES
"  Assessment & Plan     (M79.89) Swelling of left lower extremity  Comment: s/p meniscal tear and 9+ hour flight- recommend DVT US to rule out clot.  Wear compression stockings.  Plan: US Lower Extremity Venous Duplex Bilateral    (S83.721D) Tear of medial meniscus of left knee, current, unspecified tear type, subsequent encounter  Comment: Seeing [facility]- schedule preop for August.  She will call them for follow up.    (G43.109) Migraine with aura and without status migrainosus, not intractable  Comment: Vision changes with migraines- approx 3-4 times per month- stop sumatriptan given chronic small vessel changes on MRI.  Rec seeing neurology.  Plan: Adult Neurology  Referral, CANCELED:         Neurology  Referral (Migraine Care         Package)    (D32.9) Meningioma (H)  Comment: Small meningioma noted on MRI- would recommend serial imaging to monitor growth, also referring to neurology for migraine mgmt.  Plan: Adult Neurology  Referral    (Z23) Need for vaccination  Comment: Due for COVID booster.             BMI  Estimated body mass index is 30.68 kg/m  as calculated from the following:    Height as of 5/19/25: 1.676 m (5' 5.98\").    Weight as of 5/19/25: 86.2 kg (190 lb).           Addi Medina is a 70 year old, presenting for the following health issues:  Follow Up, Seizures, and Deep Vein Thrombosis (Left leg)        6/10/2025     8:40 AM   Additional Questions   Roomed by warren   Accompanied by self         6/10/2025   Declines Weight   Did patient decline having their weight taken? Yes     Seizures    History of Present Illness       Reason for visit:  Follow up on brain MRI, check for blood clot in leg, medicine check         On lower dose of pravastatin not noticing any muscle pain.    No other seizure like activity that she is aware of.  That episode on the plane she thinks was maybe because she hadn't eaten anything all day- did not have loss or change in " consciousness.        Objective    /86   Pulse 68   Temp 97  F (36.1  C) (Temporal)   Resp 16   LMP 11/30/2004   SpO2 99%   There is no height or weight on file to calculate BMI.  Physical Exam   GENERAL: alert and no distress  MS: left knee swollen, left lower leg swelling >right.  PSYCH: mentation appears normal, affect normal/bright            Signed Electronically by: Precious Akins DO

## 2025-08-12 ENCOUNTER — TELEPHONE (OUTPATIENT)
Dept: FAMILY MEDICINE | Facility: CLINIC | Age: 70
End: 2025-08-12
Payer: COMMERCIAL

## 2025-11-03 ENCOUNTER — PRE VISIT (OUTPATIENT)
Dept: NEUROLOGY | Facility: CLINIC | Age: 70
End: 2025-11-03

## (undated) DEVICE — GOWN IMPERVIOUS 2XL BLUE

## (undated) DEVICE — TUBING SUCTION 12"X1/4" N612

## (undated) DEVICE — GLOVE PROTEXIS W/NEU-THERA 7.0  2D73TE70

## (undated) DEVICE — SU ETHILON 4-0 PS-2 18" BLACK 1667H

## (undated) DEVICE — SPECIMEN CONTAINER 3OZ W/FORMALIN 59901

## (undated) DEVICE — ESU HOLSTER PLASTIC DISP E2400

## (undated) DEVICE — SLING ARM MED 79-99155

## (undated) DEVICE — DRSG STERI STRIP 1/2X4" R1547

## (undated) DEVICE — SNARE CAPIVATOR ROUND COLD SNR BX10 M00561101

## (undated) DEVICE — KIT ENDO TURNOVER/PROCEDURE CARRY-ON 101822

## (undated) DEVICE — SOL NACL 0.9% IRRIG 500ML BOTTLE 2F7123

## (undated) DEVICE — PREP SKIN SCRUB TRAY 4461A

## (undated) DEVICE — COVER CAMERA IN-LIGHT DISP LT-C02

## (undated) DEVICE — GOWN XLG DISP 9545

## (undated) DEVICE — SU MONOCRYL 5-0 P-3 18" UND Y493G

## (undated) DEVICE — SUCTION MANIFOLD NEPTUNE 2 SYS 1 PORT 702-025-000

## (undated) DEVICE — SU UMBILICAL TAPE .125X30" U11T

## (undated) DEVICE — LINEN ORTHO PACK 5446

## (undated) DEVICE — SU PROLENE 6-0 RB-2DA 30" 8711H

## (undated) DEVICE — SOL WATER IRRIG 500ML BOTTLE 2F7113

## (undated) DEVICE — PREP CHLORHEXIDINE 4% 4OZ (HIBICLENS) 57504

## (undated) DEVICE — GLOVE PROTEXIS BLUE W/NEU-THERA 7.5  2D73EB75

## (undated) DEVICE — PACK HAND CUSTOM ASC

## (undated) DEVICE — DRSG XEROFORM 1X8"

## (undated) DEVICE — SUCTION MANIFOLD NEPTUNE 2 SYS 4 PORT 0702-020-000

## (undated) DEVICE — Device

## (undated) RX ORDER — ONDANSETRON 2 MG/ML
INJECTION INTRAMUSCULAR; INTRAVENOUS
Status: DISPENSED
Start: 2022-08-05

## (undated) RX ORDER — PROPOFOL 10 MG/ML
INJECTION, EMULSION INTRAVENOUS
Status: DISPENSED
Start: 2022-08-05

## (undated) RX ORDER — FENTANYL CITRATE 50 UG/ML
INJECTION, SOLUTION INTRAMUSCULAR; INTRAVENOUS
Status: DISPENSED
Start: 2023-02-17

## (undated) RX ORDER — GABAPENTIN 100 MG/1
CAPSULE ORAL
Status: DISPENSED
Start: 2022-08-05

## (undated) RX ORDER — ACETAMINOPHEN 325 MG/1
TABLET ORAL
Status: DISPENSED
Start: 2022-08-05

## (undated) RX ORDER — FENTANYL CITRATE 50 UG/ML
INJECTION, SOLUTION INTRAMUSCULAR; INTRAVENOUS
Status: DISPENSED
Start: 2022-08-05

## (undated) RX ORDER — LIDOCAINE HYDROCHLORIDE 20 MG/ML
INJECTION, SOLUTION EPIDURAL; INFILTRATION; INTRACAUDAL; PERINEURAL
Status: DISPENSED
Start: 2022-08-05